# Patient Record
Sex: FEMALE | Race: WHITE | Employment: OTHER | URBAN - METROPOLITAN AREA
[De-identification: names, ages, dates, MRNs, and addresses within clinical notes are randomized per-mention and may not be internally consistent; named-entity substitution may affect disease eponyms.]

---

## 2019-08-01 ENCOUNTER — HOSPITAL ENCOUNTER (OUTPATIENT)
Dept: PREADMISSION TESTING | Age: 69
Discharge: HOME | End: 2019-08-05
Payer: MEDICARE

## 2019-08-01 VITALS
HEIGHT: 62 IN | SYSTOLIC BLOOD PRESSURE: 128 MMHG | OXYGEN SATURATION: 98 % | TEMPERATURE: 97.7 F | HEART RATE: 61 BPM | WEIGHT: 157 LBS | RESPIRATION RATE: 16 BRPM | BODY MASS INDEX: 28.89 KG/M2 | DIASTOLIC BLOOD PRESSURE: 63 MMHG

## 2019-08-01 DIAGNOSIS — I48.0 AF (PAROXYSMAL ATRIAL FIBRILLATION) (HCC): Chronic | ICD-10-CM

## 2019-08-01 DIAGNOSIS — K92.2 LOWER GI BLEED: Chronic | ICD-10-CM

## 2019-08-01 DIAGNOSIS — H93.13 TINNITUS OF BOTH EARS: ICD-10-CM

## 2019-08-01 DIAGNOSIS — Z87.891 FORMER SMOKER, STOPPED SMOKING IN DISTANT PAST: Chronic | ICD-10-CM

## 2019-08-01 PROBLEM — E78.5 HYPERLIPIDEMIA: Chronic | Status: ACTIVE | Noted: 2019-08-01

## 2019-08-01 PROBLEM — I10 HTN (HYPERTENSION): Chronic | Status: ACTIVE | Noted: 2019-08-01

## 2019-08-01 PROBLEM — H91.93 BILATERAL HEARING LOSS: Status: ACTIVE | Noted: 2019-08-01

## 2019-08-01 PROBLEM — H66.93 BILATERAL OTITIS MEDIA: Status: ACTIVE | Noted: 2019-08-01

## 2019-08-01 LAB
ANION GAP SERPL CALCULATED.3IONS-SCNC: 14 MEQ/L (ref 9–15)
BUN BLDV-MCNC: 8 MG/DL (ref 8–23)
CALCIUM SERPL-MCNC: 8.8 MG/DL (ref 8.5–9.9)
CHLORIDE BLD-SCNC: 105 MEQ/L (ref 95–107)
CO2: 24 MEQ/L (ref 20–31)
CREAT SERPL-MCNC: 0.56 MG/DL (ref 0.5–0.9)
GFR AFRICAN AMERICAN: >60
GFR NON-AFRICAN AMERICAN: >60
GLUCOSE BLD-MCNC: 87 MG/DL (ref 70–99)
HCT VFR BLD CALC: 34.3 % (ref 37–47)
HEMOGLOBIN: 11.2 G/DL (ref 12–16)
MCH RBC QN AUTO: 28.7 PG (ref 27–31.3)
MCHC RBC AUTO-ENTMCNC: 32.6 % (ref 33–37)
MCV RBC AUTO: 88.1 FL (ref 82–100)
PDW BLD-RTO: 20.9 % (ref 11.5–14.5)
PLATELET # BLD: 281 K/UL (ref 130–400)
POTASSIUM SERPL-SCNC: 4 MEQ/L (ref 3.4–4.9)
RBC # BLD: 3.9 M/UL (ref 4.2–5.4)
SODIUM BLD-SCNC: 143 MEQ/L (ref 135–144)
WBC # BLD: 7 K/UL (ref 4.8–10.8)

## 2019-08-01 PROCEDURE — 85027 COMPLETE CBC AUTOMATED: CPT

## 2019-08-01 PROCEDURE — 80048 BASIC METABOLIC PNL TOTAL CA: CPT

## 2019-08-01 RX ORDER — SOTALOL HYDROCHLORIDE 80 MG/1
120 TABLET ORAL 2 TIMES DAILY
COMMUNITY

## 2019-08-01 RX ORDER — LIDOCAINE HYDROCHLORIDE 10 MG/ML
1 INJECTION, SOLUTION EPIDURAL; INFILTRATION; INTRACAUDAL; PERINEURAL
Status: CANCELLED | OUTPATIENT
Start: 2019-08-08 | End: 2019-08-08

## 2019-08-01 RX ORDER — SODIUM CHLORIDE 0.9 % (FLUSH) 0.9 %
10 SYRINGE (ML) INJECTION EVERY 12 HOURS SCHEDULED
Status: CANCELLED | OUTPATIENT
Start: 2019-08-08

## 2019-08-01 RX ORDER — CHOLECALCIFEROL (VITAMIN D3) 1250 MCG
CAPSULE ORAL WEEKLY
COMMUNITY
End: 2021-12-09 | Stop reason: ALTCHOICE

## 2019-08-01 RX ORDER — SODIUM CHLORIDE 0.9 % (FLUSH) 0.9 %
10 SYRINGE (ML) INJECTION PRN
Status: CANCELLED | OUTPATIENT
Start: 2019-08-08

## 2019-08-01 RX ORDER — SODIUM CHLORIDE, SODIUM LACTATE, POTASSIUM CHLORIDE, CALCIUM CHLORIDE 600; 310; 30; 20 MG/100ML; MG/100ML; MG/100ML; MG/100ML
INJECTION, SOLUTION INTRAVENOUS CONTINUOUS
Status: CANCELLED | OUTPATIENT
Start: 2019-08-08

## 2019-08-01 RX ORDER — FLUTICASONE PROPIONATE 50 MCG
1 SPRAY, SUSPENSION (ML) NASAL PRN
COMMUNITY
End: 2019-08-01

## 2019-08-01 RX ORDER — ALBUTEROL SULFATE 2.5 MG/3ML
2.5 SOLUTION RESPIRATORY (INHALATION) EVERY 6 HOURS PRN
COMMUNITY

## 2019-08-01 ASSESSMENT — ENCOUNTER SYMPTOMS
SORE THROAT: 0
ABDOMINAL PAIN: 0
BACK PAIN: 0
WHEEZING: 0
COUGH: 0
SHORTNESS OF BREATH: 0
STRIDOR: 0
DIARRHEA: 0
TROUBLE SWALLOWING: 0
ALLERGIC/IMMUNOLOGIC NEGATIVE: 1
NAUSEA: 0
CONSTIPATION: 0
VOMITING: 0
EYES NEGATIVE: 1
CHEST TIGHTNESS: 0

## 2019-08-01 NOTE — H&P
Nurse Practitioner History and Physical      CHIEF COMPLAINT:  [place] both ears    HISTORY OF PRESENT ILLNESS:      The patient is a 68 y.o. female with significant past medical history of hearing loss & otitis media bilateral  who presents for bilateral myringotomy with ventilating tube insertion. Hx of bilateral ear aches since childhood & long standing [place]. Wears bilateral hearing aides but still [address] Scheduled for OR.     Past Medical History:        Diagnosis Date    Arthritis     CAD (coronary artery disease)     cardiac stents x 3    Cancer [facility]     right breast    COPD (chronic obstructive pulmonary disease) ([facility]     smoker since age 10    Diabetes mellitus ([facility]     hx > 30 yrs    GERD (gastroesophageal reflux disease)     History of blood transfusion 07/2019    blood transfusions x 3 due to lower GI bleed / [facility].    Hyperlipidemia     meds > 10 yrs    Hypertension     meds > 10 yrs    PVD (peripheral vascular disease) ([facility]     both legs     Past Surgical History:    Past Surgical History:   Procedure Laterality Date    BREAST SURGERY Right 1996    mastectomy due to malignancy / chemo to follow    CARDIAC CATHETERIZATION      COLONOSCOPY  08/25/2016    Geoffrey Reilly MD    CORONARY ANGIOPLASTY WITH STENT PLACEMENT      cardiac stents x 3    ENDOSCOPY, COLON, DIAGNOSTIC      EYE SURGERY      Phaco with IOL OS    HYSTERECTOMY  1968    TONSILLECTOMY      as child    TUNNELED VENOUS PORT PLACEMENT  2015    used for Iron infusions         Medications Prior to Admission:    Current Outpatient Medications   Medication Sig Dispense Refill    sotalol (BETAPACE) 80 MG tablet Take 120 mg by mouth 2 times daily      Mirabegron ER 50 MG TB24 Take by mouth daily      Cholecalciferol (VITAMIN D3) 29123 units CAPS Take by mouth once a week Indications: takes every Monday      insulin lispro protamine & lispro (HUMALOG MIX) (75-25) 100 UNIT per ML SUSP injection vial Inject into the skin 2 times Not on file   Relationships    Social connections:     Talks on phone: Not on file     Gets together: Not on file     Attends Jew service: Not on file     Active member of club or organization: Not on file     Attends meetings of clubs or organizations: Not on file     Relationship status: Not on file    Intimate partner violence:     Fear of current or ex partner: Not on file     Emotionally abused: Not on file     Physically abused: Not on file     Forced sexual activity: Not on file   Other Topics Concern    Not on file   Social History Narrative    Not on file       Family History:       Problem Relation Age of Onset    Breast Cancer Mother     Diabetes Mother     Stroke Mother     High Blood Pressure Father     Heart Disease Father     High Cholesterol Father     Diabetes Father     Stroke Father     Cancer Sister         oral cancer    Diabetes Brother     Kidney Disease Brother     Alcohol Abuse Brother         liver problems    Cancer Brother         liver & lung cancer    Other Son         PTSD / blood dyscrasia    Cancer Sister         brain & lung cancer    Other Son         MVA at age 29       Review of Systems   Constitutional: Negative. Negative for chills and fever. HENT: Positive for ear pain and hearing loss. Negative for sore throat and trouble swallowing. Full upper & lower dentures   Eyes: Negative. Negative for visual disturbance. Respiratory: Negative for cough, chest tightness, shortness of breath, wheezing and stridor. Cardiovascular: Negative for chest pain and palpitations. Gastrointestinal: Negative for abdominal pain, constipation, diarrhea, nausea and vomiting. Endocrine:        Dx diabetes   Genitourinary: Negative for dysuria and frequency. Musculoskeletal: Negative for back pain, myalgias and neck pain. Skin: Negative. Allergic/Immunologic: Negative. Neurological: Negative. Negative for seizures and headaches.

## 2019-08-01 NOTE — H&P (VIEW-ONLY)
Not on file   Relationships    Social connections:     Talks on phone: Not on file     Gets together: Not on file     Attends Yazidism service: Not on file     Active member of club or organization: Not on file     Attends meetings of clubs or organizations: Not on file     Relationship status: Not on file    Intimate partner violence:     Fear of current or ex partner: Not on file     Emotionally abused: Not on file     Physically abused: Not on file     Forced sexual activity: Not on file   Other Topics Concern    Not on file   Social History Narrative    Not on file       Family History:       Problem Relation Age of Onset    Breast Cancer Mother     Diabetes Mother     Stroke Mother     High Blood Pressure Father     Heart Disease Father     High Cholesterol Father     Diabetes Father     Stroke Father     Cancer Sister         oral cancer    Diabetes Brother     Kidney Disease Brother     Alcohol Abuse Brother         liver problems    Cancer Brother         liver & lung cancer    Other Son         PTSD / blood dyscrasia    Cancer Sister         brain & lung cancer    Other Son         MVA at age 29       Review of Systems   Constitutional: Negative. Negative for chills and fever. HENT: Positive for ear pain and hearing loss. Negative for sore throat and trouble swallowing. Full upper & lower dentures   Eyes: Negative. Negative for visual disturbance. Respiratory: Negative for cough, chest tightness, shortness of breath, wheezing and stridor. Cardiovascular: Negative for chest pain and palpitations. Gastrointestinal: Negative for abdominal pain, constipation, diarrhea, nausea and vomiting. Endocrine:        Dx diabetes   Genitourinary: Negative for dysuria and frequency. Musculoskeletal: Negative for back pain, myalgias and neck pain. Skin: Negative. Allergic/Immunologic: Negative. Neurological: Negative. Negative for seizures and headaches.

## 2019-08-08 ENCOUNTER — HOSPITAL ENCOUNTER (OUTPATIENT)
Age: 69
Setting detail: OUTPATIENT SURGERY
Discharge: HOME | End: 2019-08-08
Attending: OTOLARYNGOLOGY | Admitting: OTOLARYNGOLOGY
Payer: MEDICARE

## 2019-08-08 ENCOUNTER — ANESTHESIA EVENT (OUTPATIENT)
Dept: OPERATING ROOM | Age: 69
End: 2019-08-08
Payer: MEDICARE

## 2019-08-08 ENCOUNTER — ANESTHESIA (OUTPATIENT)
Dept: OPERATING ROOM | Age: 69
End: 2019-08-08
Payer: MEDICARE

## 2019-08-08 VITALS
BODY MASS INDEX: 28.89 KG/M2 | WEIGHT: 157 LBS | OXYGEN SATURATION: 94 % | HEART RATE: 54 BPM | RESPIRATION RATE: 20 BRPM | SYSTOLIC BLOOD PRESSURE: 140 MMHG | HEIGHT: 62 IN | TEMPERATURE: 97.7 F | DIASTOLIC BLOOD PRESSURE: 63 MMHG

## 2019-08-08 VITALS — SYSTOLIC BLOOD PRESSURE: 97 MMHG | OXYGEN SATURATION: 100 % | DIASTOLIC BLOOD PRESSURE: 50 MMHG

## 2019-08-08 LAB
GLUCOSE BLD-MCNC: 105 MG/DL (ref 60–115)
GLUCOSE BLD-MCNC: 107 MG/DL (ref 60–115)
PERFORMED ON: NORMAL
PERFORMED ON: NORMAL

## 2019-08-08 PROCEDURE — 3600000012 HC SURGERY LEVEL 2 ADDTL 15MIN: Performed by: OTOLARYNGOLOGY

## 2019-08-08 PROCEDURE — 2580000003 HC RX 258: Performed by: NURSE PRACTITIONER

## 2019-08-08 PROCEDURE — 3700000000 HC ANESTHESIA ATTENDED CARE: Performed by: OTOLARYNGOLOGY

## 2019-08-08 PROCEDURE — 7100000000 HC PACU RECOVERY - FIRST 15 MIN: Performed by: OTOLARYNGOLOGY

## 2019-08-08 PROCEDURE — 3600000002 HC SURGERY LEVEL 2 BASE: Performed by: OTOLARYNGOLOGY

## 2019-08-08 PROCEDURE — 2580000003 HC RX 258: Performed by: OTOLARYNGOLOGY

## 2019-08-08 PROCEDURE — 2709999900 HC NON-CHARGEABLE SUPPLY: Performed by: OTOLARYNGOLOGY

## 2019-08-08 PROCEDURE — 2580000003 HC RX 258: Performed by: ANESTHESIOLOGY

## 2019-08-08 PROCEDURE — 7100000010 HC PHASE II RECOVERY - FIRST 15 MIN: Performed by: OTOLARYNGOLOGY

## 2019-08-08 PROCEDURE — 6360000002 HC RX W HCPCS: Performed by: NURSE ANESTHETIST, CERTIFIED REGISTERED

## 2019-08-08 PROCEDURE — 2780000010 HC IMPLANT OTHER: Performed by: OTOLARYNGOLOGY

## 2019-08-08 PROCEDURE — 7100000001 HC PACU RECOVERY - ADDTL 15 MIN: Performed by: OTOLARYNGOLOGY

## 2019-08-08 PROCEDURE — 7100000011 HC PHASE II RECOVERY - ADDTL 15 MIN: Performed by: OTOLARYNGOLOGY

## 2019-08-08 PROCEDURE — 3700000001 HC ADD 15 MINUTES (ANESTHESIA): Performed by: OTOLARYNGOLOGY

## 2019-08-08 PROCEDURE — 6370000000 HC RX 637 (ALT 250 FOR IP): Performed by: OTOLARYNGOLOGY

## 2019-08-08 DEVICE — TUBE EAR VENTILATION 1.14X7.5MM: Type: IMPLANTABLE DEVICE | Site: EAR | Status: FUNCTIONAL

## 2019-08-08 RX ORDER — DEXAMETHASONE SODIUM PHOSPHATE 10 MG/ML
INJECTION INTRAMUSCULAR; INTRAVENOUS PRN
Status: DISCONTINUED | OUTPATIENT
Start: 2019-08-08 | End: 2019-08-08 | Stop reason: SDUPTHER

## 2019-08-08 RX ORDER — MAGNESIUM HYDROXIDE 1200 MG/15ML
LIQUID ORAL CONTINUOUS PRN
Status: COMPLETED | OUTPATIENT
Start: 2019-08-08 | End: 2019-08-08

## 2019-08-08 RX ORDER — LIDOCAINE HYDROCHLORIDE 10 MG/ML
1 INJECTION, SOLUTION EPIDURAL; INFILTRATION; INTRACAUDAL; PERINEURAL
Status: DISCONTINUED | OUTPATIENT
Start: 2019-08-08 | End: 2019-08-08 | Stop reason: HOSPADM

## 2019-08-08 RX ORDER — METOCLOPRAMIDE HYDROCHLORIDE 5 MG/ML
10 INJECTION INTRAMUSCULAR; INTRAVENOUS
Status: DISCONTINUED | OUTPATIENT
Start: 2019-08-08 | End: 2019-08-08 | Stop reason: HOSPADM

## 2019-08-08 RX ORDER — SODIUM CHLORIDE 0.9 % (FLUSH) 0.9 %
10 SYRINGE (ML) INJECTION EVERY 12 HOURS SCHEDULED
Status: DISCONTINUED | OUTPATIENT
Start: 2019-08-08 | End: 2019-08-08 | Stop reason: HOSPADM

## 2019-08-08 RX ORDER — DIPHENHYDRAMINE HYDROCHLORIDE 50 MG/ML
12.5 INJECTION INTRAMUSCULAR; INTRAVENOUS
Status: DISCONTINUED | OUTPATIENT
Start: 2019-08-08 | End: 2019-08-08 | Stop reason: HOSPADM

## 2019-08-08 RX ORDER — SODIUM CHLORIDE 0.9 % (FLUSH) 0.9 %
10 SYRINGE (ML) INJECTION PRN
Status: DISCONTINUED | OUTPATIENT
Start: 2019-08-08 | End: 2019-08-08 | Stop reason: HOSPADM

## 2019-08-08 RX ORDER — HYDROCODONE BITARTRATE AND ACETAMINOPHEN 5; 325 MG/1; MG/1
1 TABLET ORAL PRN
Status: DISCONTINUED | OUTPATIENT
Start: 2019-08-08 | End: 2019-08-08 | Stop reason: HOSPADM

## 2019-08-08 RX ORDER — MEPERIDINE HYDROCHLORIDE 25 MG/ML
12.5 INJECTION INTRAMUSCULAR; INTRAVENOUS; SUBCUTANEOUS EVERY 5 MIN PRN
Status: DISCONTINUED | OUTPATIENT
Start: 2019-08-08 | End: 2019-08-08 | Stop reason: HOSPADM

## 2019-08-08 RX ORDER — LIDOCAINE HYDROCHLORIDE 20 MG/ML
INJECTION, SOLUTION INTRAVENOUS PRN
Status: DISCONTINUED | OUTPATIENT
Start: 2019-08-08 | End: 2019-08-08 | Stop reason: SDUPTHER

## 2019-08-08 RX ORDER — ONDANSETRON 2 MG/ML
INJECTION INTRAMUSCULAR; INTRAVENOUS PRN
Status: DISCONTINUED | OUTPATIENT
Start: 2019-08-08 | End: 2019-08-08 | Stop reason: SDUPTHER

## 2019-08-08 RX ORDER — ONDANSETRON 2 MG/ML
4 INJECTION INTRAMUSCULAR; INTRAVENOUS
Status: DISCONTINUED | OUTPATIENT
Start: 2019-08-08 | End: 2019-08-08 | Stop reason: HOSPADM

## 2019-08-08 RX ORDER — SODIUM CHLORIDE, SODIUM LACTATE, POTASSIUM CHLORIDE, CALCIUM CHLORIDE 600; 310; 30; 20 MG/100ML; MG/100ML; MG/100ML; MG/100ML
INJECTION, SOLUTION INTRAVENOUS CONTINUOUS
Status: DISCONTINUED | OUTPATIENT
Start: 2019-08-08 | End: 2019-08-08 | Stop reason: HOSPADM

## 2019-08-08 RX ORDER — HYDROCODONE BITARTRATE AND ACETAMINOPHEN 5; 325 MG/1; MG/1
2 TABLET ORAL PRN
Status: DISCONTINUED | OUTPATIENT
Start: 2019-08-08 | End: 2019-08-08 | Stop reason: HOSPADM

## 2019-08-08 RX ORDER — PROPOFOL 10 MG/ML
INJECTION, EMULSION INTRAVENOUS PRN
Status: DISCONTINUED | OUTPATIENT
Start: 2019-08-08 | End: 2019-08-08 | Stop reason: SDUPTHER

## 2019-08-08 RX ORDER — FENTANYL CITRATE 50 UG/ML
50 INJECTION, SOLUTION INTRAMUSCULAR; INTRAVENOUS EVERY 10 MIN PRN
Status: DISCONTINUED | OUTPATIENT
Start: 2019-08-08 | End: 2019-08-08 | Stop reason: HOSPADM

## 2019-08-08 RX ADMIN — ONDANSETRON 4 MG: 2 INJECTION INTRAMUSCULAR; INTRAVENOUS at 10:04

## 2019-08-08 RX ADMIN — PROPOFOL 30 MG: 10 INJECTION, EMULSION INTRAVENOUS at 10:16

## 2019-08-08 RX ADMIN — PROPOFOL 120 MG: 10 INJECTION, EMULSION INTRAVENOUS at 10:00

## 2019-08-08 RX ADMIN — DEXAMETHASONE SODIUM PHOSPHATE 10 MG: 10 INJECTION INTRAMUSCULAR; INTRAVENOUS at 10:04

## 2019-08-08 RX ADMIN — SODIUM CHLORIDE, POTASSIUM CHLORIDE, SODIUM LACTATE AND CALCIUM CHLORIDE: 600; 310; 30; 20 INJECTION, SOLUTION INTRAVENOUS at 09:56

## 2019-08-08 RX ADMIN — SODIUM CHLORIDE, POTASSIUM CHLORIDE, SODIUM LACTATE AND CALCIUM CHLORIDE: 600; 310; 30; 20 INJECTION, SOLUTION INTRAVENOUS at 08:49

## 2019-08-08 RX ADMIN — LIDOCAINE HYDROCHLORIDE 80 MG: 20 INJECTION, SOLUTION INTRAVENOUS at 10:00

## 2019-08-08 ASSESSMENT — PULMONARY FUNCTION TESTS
PIF_VALUE: 21
PIF_VALUE: 18
PIF_VALUE: 3
PIF_VALUE: 12
PIF_VALUE: 18
PIF_VALUE: 2
PIF_VALUE: 15
PIF_VALUE: 1
PIF_VALUE: 15
PIF_VALUE: 1
PIF_VALUE: 18
PIF_VALUE: 18
PIF_VALUE: 20
PIF_VALUE: 3
PIF_VALUE: 18
PIF_VALUE: 9
PIF_VALUE: 5
PIF_VALUE: 0
PIF_VALUE: 1
PIF_VALUE: 18
PIF_VALUE: 15
PIF_VALUE: 15
PIF_VALUE: 18
PIF_VALUE: 3
PIF_VALUE: 12
PIF_VALUE: 15
PIF_VALUE: 18
PIF_VALUE: 6
PIF_VALUE: 18
PIF_VALUE: 1
PIF_VALUE: 1
PIF_VALUE: 2

## 2019-08-08 ASSESSMENT — COPD QUESTIONNAIRES: CAT_SEVERITY: NO INTERVAL CHANGE

## 2019-08-08 ASSESSMENT — PAIN - FUNCTIONAL ASSESSMENT: PAIN_FUNCTIONAL_ASSESSMENT: 0-10

## 2019-08-08 NOTE — ANESTHESIA PRE PROCEDURE
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    Chronic GI bleeding K92.2    COPD (chronic obstructive pulmonary disease) ([facility]) J44.9    Coronary atherosclerosis I25.10    Iron (Fe) deficiency anemia D50.9    NTEMI (non-ST elevation myocardial infarction) I21.4    PVD (peripheral vascular disease) ([facility]) I73.9    Senile nuclear sclerosis H25.10    Former smoker, stopped smoking in distant past Z87.891    Lower GI bleed K92.2    HTN (hypertension) I10    Hyperlipidemia E78.5    AF (paroxysmal atrial fibrillation) ([facility]) I48.0       Past Medical History:        Diagnosis Date    Arthritis     CAD (coronary artery disease)     cardiac stents x 3    Cancer ([facility]     right breast    COPD (chronic obstructive pulmonary disease) ([facility])     smoker since age 10    Diabetes mellitus ([facility])     hx > 30 yrs    GERD (gastroesophageal reflux disease)     History of blood transfusion 07/2019    blood transfusions x 3 due to [facility].    Hyperlipidemia     meds > 10 yrs    Hypertension     meds > 10 yrs    PVD (peripheral vascular disease) ([facility]     both legs       Past Surgical History:        Procedure Laterality Date    BREAST SURGERY Right 1996    mastectomy due to malignancy / chemo to follow    CARDIAC CATHETERIZATION      COLONOSCOPY  08/25/2016    Carri Harry MD    CORONARY ANGIOPLASTY WITH STENT PLACEMENT      cardiac stents x 3    ENDOSCOPY, COLON, DIAGNOSTIC      EYE SURGERY      Phaco with IOL OS    HYSTERECTOMY  1968    TONSILLECTOMY      as child   Rasheeda Treadwell Airway: Mallampati: II  TM distance: >3 FB   Neck ROM: full  Mouth opening: > = 3 FB Dental: normal exam         Pulmonary:normal exam    (+) COPD: no interval change,                             Cardiovascular:    (+) hypertension: no interval change, past MI: no interval change, CAD: no interval change,       ECG reviewed               Beta Blocker:  Dose within 24 Hrs         Neuro/Psych:   Negative Neuro/Psych ROS              GI/Hepatic/Renal:   (+) GERD:,           Endo/Other:    (+) Diabetes, . Pt had PAT visit. Abdominal:           Vascular: negative vascular ROS. Anesthesia Plan      general     ASA 3       Induction: intravenous. MIPS: Prophylactic antiemetics administered. Anesthetic plan and risks discussed with patient. Plan discussed with CRNA.     Attending anesthesiologist reviewed and agrees with Pre Eval content              Abbe Hernandez MD   8/8/2019

## 2019-08-08 NOTE — OP NOTE
[address]                      [address], [address] [facility]                                OPERATIVE REPORT    PATIENT NAME: Dayanara Skelton                       :        1950  MED REC NO:   [id number]                            ROOM:  ACCOUNT NO:   [de-identified]                           ADMIT DATE: 2019  PROVIDER:     Mary Ann Argueta MD    DATE OF PROCEDURE:  2019    PREOPERATIVE DIAGNOSIS:  Bilateral conductive hearing loss secondary to  ear effusion and monomeric membranes. POSTOPERATIVE DIAGNOSIS  Bilateral conductive hearing loss secondary to  ear effusion and monomeric membranes. OPERATION PERFORMED:  Bilateral myringotomy and ventilating tube  Insertion. EBL: none    SURGEON:  Mary Ann Argueta MD    SCRUB NURSE:  Devi Alberts. ANESTHESIA:  General.    ANESTHESIOLOGIST:  Ward Wade MD    CLINICAL INDICATIONS:  This is a 78-year-old white female who was  initially seen in the office due to a longstanding history of recurrent  middle ear infection characterized by hearing loss, otalgia with  dysequilibrium. She had been diagnosed to have recurrent otitis media  before and underwent bilateral myringotomy and ventilating tube  insertion in [place]. She came back care with the same issue, saw her  family doctor and subsequently referred to me for evaluation. Initial  exam in the office showed that the right tympanic membrane was retracted  with the monomeric membrane with air fluid level and the left side was  very thick and also presence of air fluid level under the drum. It was  decided that this patient undergo the aforementioned surgical  intervention. OPERATIVE PROCEDURE:  The patient was placed in supine position and  general anesthesia utilizing an LMA was satisfactorily inducted. The  right ear was inspected under the microscope with magnification of 250X  lens.   The previously noted findings were seen with retraction

## 2019-10-07 ENCOUNTER — APPOINTMENT (OUTPATIENT)
Dept: GENERAL RADIOLOGY | Age: 69
End: 2019-10-07
Payer: MEDICARE

## 2019-10-07 ENCOUNTER — HOSPITAL ENCOUNTER (EMERGENCY)
Age: 69
Discharge: HOME | End: 2019-10-07
Payer: MEDICARE

## 2019-10-07 ENCOUNTER — APPOINTMENT (OUTPATIENT)
Dept: CT IMAGING | Age: 69
End: 2019-10-07
Payer: MEDICARE

## 2019-10-07 VITALS
TEMPERATURE: 98 F | HEIGHT: 62 IN | OXYGEN SATURATION: 96 % | SYSTOLIC BLOOD PRESSURE: 150 MMHG | RESPIRATION RATE: 16 BRPM | WEIGHT: 149 LBS | HEART RATE: 56 BPM | DIASTOLIC BLOOD PRESSURE: 46 MMHG | BODY MASS INDEX: 27.42 KG/M2

## 2019-10-07 DIAGNOSIS — S20.212A CHEST WALL CONTUSION, LEFT, INITIAL ENCOUNTER: ICD-10-CM

## 2019-10-07 DIAGNOSIS — S51.019A SKIN TEAR OF ELBOW WITHOUT COMPLICATION, INITIAL ENCOUNTER: ICD-10-CM

## 2019-10-07 DIAGNOSIS — S09.90XA CLOSED HEAD INJURY, INITIAL ENCOUNTER: Primary | ICD-10-CM

## 2019-10-07 LAB
ALBUMIN SERPL-MCNC: 3.6 G/DL (ref 3.5–4.6)
ALP BLD-CCNC: 65 U/L (ref 40–130)
ALT SERPL-CCNC: 6 U/L (ref 0–33)
ANION GAP SERPL CALCULATED.3IONS-SCNC: 15 MEQ/L (ref 9–15)
APTT: 28.5 SEC (ref 24.4–36.8)
AST SERPL-CCNC: 13 U/L (ref 0–35)
BASOPHILS ABSOLUTE: 0 K/UL (ref 0–0.2)
BASOPHILS RELATIVE PERCENT: 0.3 %
BILIRUB SERPL-MCNC: 0.5 MG/DL (ref 0.2–0.7)
BUN BLDV-MCNC: 9 MG/DL (ref 8–23)
CALCIUM SERPL-MCNC: 8.7 MG/DL (ref 8.5–9.9)
CHLORIDE BLD-SCNC: 102 MEQ/L (ref 95–107)
CO2: 24 MEQ/L (ref 20–31)
CREAT SERPL-MCNC: 0.51 MG/DL (ref 0.5–0.9)
EOSINOPHILS ABSOLUTE: 0.2 K/UL (ref 0–0.7)
EOSINOPHILS RELATIVE PERCENT: 2 %
GFR AFRICAN AMERICAN: >60
GFR NON-AFRICAN AMERICAN: >60
GLOBULIN: 2.6 G/DL (ref 2.3–3.5)
GLUCOSE BLD-MCNC: 109 MG/DL (ref 70–99)
HCT VFR BLD CALC: 38.3 % (ref 37–47)
HEMOGLOBIN: 12.5 G/DL (ref 12–16)
INR BLD: 1
LYMPHOCYTES ABSOLUTE: 1.6 K/UL (ref 1–4.8)
LYMPHOCYTES RELATIVE PERCENT: 17.7 %
MCH RBC QN AUTO: 27.3 PG (ref 27–31.3)
MCHC RBC AUTO-ENTMCNC: 32.5 % (ref 33–37)
MCV RBC AUTO: 83.9 FL (ref 82–100)
MONOCYTES ABSOLUTE: 0.5 K/UL (ref 0.2–0.8)
MONOCYTES RELATIVE PERCENT: 5.1 %
NEUTROPHILS ABSOLUTE: 6.8 K/UL (ref 1.4–6.5)
NEUTROPHILS RELATIVE PERCENT: 74.9 %
PDW BLD-RTO: 17.8 % (ref 11.5–14.5)
PLATELET # BLD: 181 K/UL (ref 130–400)
POTASSIUM SERPL-SCNC: 4.3 MEQ/L (ref 3.4–4.9)
PROTHROMBIN TIME: 13.2 SEC (ref 12.3–14.9)
RBC # BLD: 4.56 M/UL (ref 4.2–5.4)
SODIUM BLD-SCNC: 141 MEQ/L (ref 135–144)
TOTAL PROTEIN: 6.2 G/DL (ref 6.3–8)
WBC # BLD: 9.1 K/UL (ref 4.8–10.8)

## 2019-10-07 PROCEDURE — 85730 THROMBOPLASTIN TIME PARTIAL: CPT

## 2019-10-07 PROCEDURE — 6370000000 HC RX 637 (ALT 250 FOR IP): Performed by: PHYSICIAN ASSISTANT

## 2019-10-07 PROCEDURE — 72050 X-RAY EXAM NECK SPINE 4/5VWS: CPT

## 2019-10-07 PROCEDURE — 73030 X-RAY EXAM OF SHOULDER: CPT

## 2019-10-07 PROCEDURE — 71101 X-RAY EXAM UNILAT RIBS/CHEST: CPT

## 2019-10-07 PROCEDURE — 73070 X-RAY EXAM OF ELBOW: CPT

## 2019-10-07 PROCEDURE — 85610 PROTHROMBIN TIME: CPT

## 2019-10-07 PROCEDURE — 36415 COLL VENOUS BLD VENIPUNCTURE: CPT

## 2019-10-07 PROCEDURE — 99284 EMERGENCY DEPT VISIT MOD MDM: CPT

## 2019-10-07 PROCEDURE — 70450 CT HEAD/BRAIN W/O DYE: CPT

## 2019-10-07 PROCEDURE — 85025 COMPLETE CBC W/AUTO DIFF WBC: CPT

## 2019-10-07 PROCEDURE — 80053 COMPREHEN METABOLIC PANEL: CPT

## 2019-10-07 RX ORDER — SULFAMETHOXAZOLE AND TRIMETHOPRIM 800; 160 MG/1; MG/1
1 TABLET ORAL ONCE
Status: COMPLETED | OUTPATIENT
Start: 2019-10-07 | End: 2019-10-07

## 2019-10-07 RX ORDER — HYDROCODONE BITARTRATE AND ACETAMINOPHEN 5; 325 MG/1; MG/1
1 TABLET ORAL EVERY 6 HOURS PRN
Qty: 8 TABLET | Refills: 0 | Status: SHIPPED | OUTPATIENT
Start: 2019-10-07 | End: 2019-10-09

## 2019-10-07 RX ORDER — SULFAMETHOXAZOLE AND TRIMETHOPRIM 800; 160 MG/1; MG/1
1 TABLET ORAL 2 TIMES DAILY
Qty: 14 TABLET | Refills: 0 | Status: SHIPPED | OUTPATIENT
Start: 2019-10-07 | End: 2019-10-14

## 2019-10-07 RX ORDER — BACITRACIN, NEOMYCIN, POLYMYXIN B 400; 3.5; 5 [USP'U]/G; MG/G; [USP'U]/G
OINTMENT TOPICAL ONCE
Status: COMPLETED | OUTPATIENT
Start: 2019-10-07 | End: 2019-10-07

## 2019-10-07 RX ADMIN — SULFAMETHOXAZOLE AND TRIMETHOPRIM 1 TABLET: 800; 160 TABLET ORAL at 14:16

## 2019-10-07 RX ADMIN — BACITRACIN ZINC, NEOMYCIN, POLYMYXIN B: 400; 3.5; 5 OINTMENT TOPICAL at 13:16

## 2019-10-07 ASSESSMENT — ENCOUNTER SYMPTOMS
CONSTIPATION: 0
EYE DISCHARGE: 0
ABDOMINAL DISTENTION: 0
COLOR CHANGE: 0
ABDOMINAL PAIN: 0
SORE THROAT: 0
RHINORRHEA: 0
SHORTNESS OF BREATH: 0

## 2019-10-07 ASSESSMENT — PAIN DESCRIPTION - PAIN TYPE: TYPE: ACUTE PAIN

## 2019-10-07 ASSESSMENT — PAIN DESCRIPTION - LOCATION: LOCATION: ARM

## 2019-10-07 ASSESSMENT — PAIN SCALES - GENERAL: PAINLEVEL_OUTOF10: 7

## 2019-10-12 ENCOUNTER — HOSPITAL ENCOUNTER (EMERGENCY)
Age: 69
Discharge: HOME | End: 2019-10-13
Payer: MEDICARE

## 2019-10-12 ENCOUNTER — APPOINTMENT (OUTPATIENT)
Dept: CT IMAGING | Age: 69
End: 2019-10-12
Payer: MEDICARE

## 2019-10-12 DIAGNOSIS — S22.32XA CLOSED FRACTURE OF ONE RIB OF LEFT SIDE, INITIAL ENCOUNTER: Primary | ICD-10-CM

## 2019-10-12 DIAGNOSIS — J44.1 COPD EXACERBATION (HCC): ICD-10-CM

## 2019-10-12 DIAGNOSIS — J40 BRONCHITIS: ICD-10-CM

## 2019-10-12 LAB
ALBUMIN SERPL-MCNC: 4.2 G/DL (ref 3.5–4.6)
ALP BLD-CCNC: 66 U/L (ref 40–130)
ALT SERPL-CCNC: 8 U/L (ref 0–33)
ANION GAP SERPL CALCULATED.3IONS-SCNC: 13 MEQ/L (ref 9–15)
AST SERPL-CCNC: 14 U/L (ref 0–35)
BASOPHILS ABSOLUTE: 0.1 K/UL (ref 0–0.2)
BASOPHILS RELATIVE PERCENT: 1.1 %
BILIRUB SERPL-MCNC: 0.3 MG/DL (ref 0.2–0.7)
BUN BLDV-MCNC: 19 MG/DL (ref 8–23)
CALCIUM SERPL-MCNC: 9.7 MG/DL (ref 8.5–9.9)
CHLORIDE BLD-SCNC: 101 MEQ/L (ref 95–107)
CO2: 25 MEQ/L (ref 20–31)
CREAT SERPL-MCNC: 0.94 MG/DL (ref 0.5–0.9)
EKG ATRIAL RATE: 53 BPM
EKG P AXIS: 50 DEGREES
EKG P-R INTERVAL: 134 MS
EKG Q-T INTERVAL: 514 MS
EKG QRS DURATION: 82 MS
EKG QTC CALCULATION (BAZETT): 482 MS
EKG R AXIS: 44 DEGREES
EKG T AXIS: 78 DEGREES
EKG VENTRICULAR RATE: 53 BPM
EOSINOPHILS ABSOLUTE: 0.3 K/UL (ref 0–0.7)
EOSINOPHILS RELATIVE PERCENT: 3.3 %
GFR AFRICAN AMERICAN: >60
GFR NON-AFRICAN AMERICAN: 58.6
GLOBULIN: 2.6 G/DL (ref 2.3–3.5)
GLUCOSE BLD-MCNC: 102 MG/DL (ref 70–99)
HCT VFR BLD CALC: 39.6 % (ref 37–47)
HEMOGLOBIN: 12.8 G/DL (ref 12–16)
LYMPHOCYTES ABSOLUTE: 1.8 K/UL (ref 1–4.8)
LYMPHOCYTES RELATIVE PERCENT: 22.5 %
MCH RBC QN AUTO: 27 PG (ref 27–31.3)
MCHC RBC AUTO-ENTMCNC: 32.3 % (ref 33–37)
MCV RBC AUTO: 83.5 FL (ref 82–100)
MONOCYTES ABSOLUTE: 0.6 K/UL (ref 0.2–0.8)
MONOCYTES RELATIVE PERCENT: 7 %
NEUTROPHILS ABSOLUTE: 5.4 K/UL (ref 1.4–6.5)
NEUTROPHILS RELATIVE PERCENT: 66.1 %
PDW BLD-RTO: 18.3 % (ref 11.5–14.5)
PLATELET # BLD: 189 K/UL (ref 130–400)
POC CREATININE WHOLE BLOOD: 1
POTASSIUM SERPL-SCNC: 5 MEQ/L (ref 3.4–4.9)
RBC # BLD: 4.74 M/UL (ref 4.2–5.4)
SODIUM BLD-SCNC: 139 MEQ/L (ref 135–144)
TOTAL PROTEIN: 6.8 G/DL (ref 6.3–8)
TROPONIN: <0.01 NG/ML (ref 0–0.01)
WBC # BLD: 8.1 K/UL (ref 4.8–10.8)

## 2019-10-12 PROCEDURE — 2580000003 HC RX 258: Performed by: PERSONAL EMERGENCY RESPONSE ATTENDANT

## 2019-10-12 PROCEDURE — 6370000000 HC RX 637 (ALT 250 FOR IP): Performed by: PERSONAL EMERGENCY RESPONSE ATTENDANT

## 2019-10-12 PROCEDURE — 70450 CT HEAD/BRAIN W/O DYE: CPT

## 2019-10-12 PROCEDURE — 85025 COMPLETE CBC W/AUTO DIFF WBC: CPT

## 2019-10-12 PROCEDURE — 99285 EMERGENCY DEPT VISIT HI MDM: CPT

## 2019-10-12 PROCEDURE — 6360000002 HC RX W HCPCS: Performed by: PERSONAL EMERGENCY RESPONSE ATTENDANT

## 2019-10-12 PROCEDURE — 71275 CT ANGIOGRAPHY CHEST: CPT

## 2019-10-12 PROCEDURE — 93005 ELECTROCARDIOGRAM TRACING: CPT | Performed by: PERSONAL EMERGENCY RESPONSE ATTENDANT

## 2019-10-12 PROCEDURE — 94640 AIRWAY INHALATION TREATMENT: CPT

## 2019-10-12 PROCEDURE — 96375 TX/PRO/DX INJ NEW DRUG ADDON: CPT

## 2019-10-12 PROCEDURE — 36415 COLL VENOUS BLD VENIPUNCTURE: CPT

## 2019-10-12 PROCEDURE — 6360000004 HC RX CONTRAST MEDICATION: Performed by: PERSONAL EMERGENCY RESPONSE ATTENDANT

## 2019-10-12 PROCEDURE — 80053 COMPREHEN METABOLIC PANEL: CPT

## 2019-10-12 PROCEDURE — 96374 THER/PROPH/DIAG INJ IV PUSH: CPT

## 2019-10-12 PROCEDURE — 84484 ASSAY OF TROPONIN QUANT: CPT

## 2019-10-12 RX ORDER — IPRATROPIUM BROMIDE AND ALBUTEROL SULFATE 2.5; .5 MG/3ML; MG/3ML
1 SOLUTION RESPIRATORY (INHALATION) CONTINUOUS PRN
Status: DISCONTINUED | OUTPATIENT
Start: 2019-10-12 | End: 2019-10-13 | Stop reason: HOSPADM

## 2019-10-12 RX ORDER — METHYLPREDNISOLONE SODIUM SUCCINATE 125 MG/2ML
125 INJECTION, POWDER, LYOPHILIZED, FOR SOLUTION INTRAMUSCULAR; INTRAVENOUS ONCE
Status: COMPLETED | OUTPATIENT
Start: 2019-10-12 | End: 2019-10-12

## 2019-10-12 RX ORDER — FENTANYL CITRATE 50 UG/ML
50 INJECTION, SOLUTION INTRAMUSCULAR; INTRAVENOUS ONCE
Status: COMPLETED | OUTPATIENT
Start: 2019-10-12 | End: 2019-10-12

## 2019-10-12 RX ORDER — 0.9 % SODIUM CHLORIDE 0.9 %
1000 INTRAVENOUS SOLUTION INTRAVENOUS ONCE
Status: COMPLETED | OUTPATIENT
Start: 2019-10-12 | End: 2019-10-13

## 2019-10-12 RX ORDER — ONDANSETRON 2 MG/ML
4 INJECTION INTRAMUSCULAR; INTRAVENOUS ONCE
Status: COMPLETED | OUTPATIENT
Start: 2019-10-12 | End: 2019-10-12

## 2019-10-12 RX ADMIN — FENTANYL CITRATE 50 MCG: 50 INJECTION, SOLUTION INTRAMUSCULAR; INTRAVENOUS at 22:46

## 2019-10-12 RX ADMIN — METHYLPREDNISOLONE SODIUM SUCCINATE 125 MG: 125 INJECTION, POWDER, FOR SOLUTION INTRAMUSCULAR; INTRAVENOUS at 22:46

## 2019-10-12 RX ADMIN — IPRATROPIUM BROMIDE AND ALBUTEROL SULFATE 1 AMPULE: .5; 3 SOLUTION RESPIRATORY (INHALATION) at 23:01

## 2019-10-12 RX ADMIN — SODIUM CHLORIDE 1000 ML: 9 INJECTION, SOLUTION INTRAVENOUS at 23:59

## 2019-10-12 RX ADMIN — ONDANSETRON 4 MG: 2 INJECTION INTRAMUSCULAR; INTRAVENOUS at 22:45

## 2019-10-12 RX ADMIN — IOPAMIDOL 100 ML: 612 INJECTION, SOLUTION INTRAVENOUS at 23:54

## 2019-10-12 RX ADMIN — IPRATROPIUM BROMIDE AND ALBUTEROL SULFATE 1 AMPULE: .5; 3 SOLUTION RESPIRATORY (INHALATION) at 23:06

## 2019-10-12 RX ADMIN — IPRATROPIUM BROMIDE AND ALBUTEROL SULFATE 1 AMPULE: .5; 3 SOLUTION RESPIRATORY (INHALATION) at 22:54

## 2019-10-12 ASSESSMENT — PAIN DESCRIPTION - PAIN TYPE
TYPE: ACUTE PAIN
TYPE: ACUTE PAIN

## 2019-10-12 ASSESSMENT — ENCOUNTER SYMPTOMS
COUGH: 1
VOMITING: 1
SORE THROAT: 0
RHINORRHEA: 0
SHORTNESS OF BREATH: 1
COLOR CHANGE: 0
NAUSEA: 1
DIARRHEA: 0
ABDOMINAL PAIN: 0
BLOOD IN STOOL: 0

## 2019-10-12 ASSESSMENT — PAIN DESCRIPTION - ORIENTATION
ORIENTATION: LEFT
ORIENTATION: LEFT

## 2019-10-12 ASSESSMENT — PAIN DESCRIPTION - LOCATION
LOCATION: RIB CAGE
LOCATION: RIB CAGE

## 2019-10-12 ASSESSMENT — PAIN DESCRIPTION - FREQUENCY: FREQUENCY: CONTINUOUS

## 2019-10-12 ASSESSMENT — PAIN SCALES - GENERAL
PAINLEVEL_OUTOF10: 10

## 2019-10-13 VITALS
BODY MASS INDEX: 27.42 KG/M2 | RESPIRATION RATE: 15 BRPM | OXYGEN SATURATION: 95 % | WEIGHT: 149 LBS | HEIGHT: 62 IN | DIASTOLIC BLOOD PRESSURE: 97 MMHG | TEMPERATURE: 97.5 F | HEART RATE: 55 BPM | SYSTOLIC BLOOD PRESSURE: 115 MMHG

## 2019-10-13 PROCEDURE — 6360000002 HC RX W HCPCS: Performed by: PERSONAL EMERGENCY RESPONSE ATTENDANT

## 2019-10-13 PROCEDURE — 96376 TX/PRO/DX INJ SAME DRUG ADON: CPT

## 2019-10-13 PROCEDURE — 94150 VITAL CAPACITY TEST: CPT

## 2019-10-13 RX ORDER — OXYCODONE HYDROCHLORIDE AND ACETAMINOPHEN 5; 325 MG/1; MG/1
1-2 TABLET ORAL EVERY 6 HOURS PRN
Qty: 10 TABLET | Refills: 0 | Status: SHIPPED | OUTPATIENT
Start: 2019-10-13 | End: 2019-10-16

## 2019-10-13 RX ORDER — PREDNISONE 20 MG/1
40 TABLET ORAL DAILY
Qty: 6 TABLET | Refills: 0 | Status: SHIPPED | OUTPATIENT
Start: 2019-10-13 | End: 2019-10-16

## 2019-10-13 RX ORDER — DOXYCYCLINE HYCLATE 100 MG/1
100 CAPSULE ORAL 2 TIMES DAILY
Qty: 20 CAPSULE | Refills: 0 | Status: SHIPPED | OUTPATIENT
Start: 2019-10-13 | End: 2019-10-23

## 2019-10-13 RX ORDER — FENTANYL CITRATE 50 UG/ML
25 INJECTION, SOLUTION INTRAMUSCULAR; INTRAVENOUS ONCE
Status: COMPLETED | OUTPATIENT
Start: 2019-10-13 | End: 2019-10-13

## 2019-10-13 RX ADMIN — FENTANYL CITRATE 25 MCG: 50 INJECTION, SOLUTION INTRAMUSCULAR; INTRAVENOUS at 00:57

## 2019-10-13 ASSESSMENT — PAIN SCALES - GENERAL
PAINLEVEL_OUTOF10: 4
PAINLEVEL_OUTOF10: 4
PAINLEVEL_OUTOF10: 6

## 2019-10-13 ASSESSMENT — PAIN DESCRIPTION - PAIN TYPE
TYPE: ACUTE PAIN
TYPE: ACUTE PAIN

## 2019-10-13 ASSESSMENT — PAIN DESCRIPTION - LOCATION: LOCATION: RIB CAGE

## 2019-10-14 LAB
GFR AFRICAN AMERICAN: >60
GFR NON-AFRICAN AMERICAN: 55
PERFORMED ON: ABNORMAL
POC CREATININE: 1 MG/DL (ref 0.6–1.2)
POC SAMPLE TYPE: ABNORMAL

## 2019-10-14 PROCEDURE — 93010 ELECTROCARDIOGRAM REPORT: CPT | Performed by: INTERNAL MEDICINE

## 2021-07-13 LAB
LEFT VENTRICULAR EJECTION FRACTION MODE: NORMAL
LV EF: 60 %

## 2021-12-09 ENCOUNTER — OFFICE VISIT (OUTPATIENT)
Dept: FAMILY MEDICINE CLINIC | Age: 71
End: 2021-12-09
Payer: MEDICARE

## 2021-12-09 VITALS
BODY MASS INDEX: 26.17 KG/M2 | WEIGHT: 142.2 LBS | TEMPERATURE: 97.5 F | OXYGEN SATURATION: 96 % | HEART RATE: 59 BPM | DIASTOLIC BLOOD PRESSURE: 60 MMHG | SYSTOLIC BLOOD PRESSURE: 118 MMHG | HEIGHT: 62 IN

## 2021-12-09 DIAGNOSIS — E11.9 TYPE 2 DIABETES MELLITUS WITHOUT COMPLICATION, WITHOUT LONG-TERM CURRENT USE OF INSULIN (HCC): Primary | ICD-10-CM

## 2021-12-09 DIAGNOSIS — I73.9 PERIPHERAL VASCULAR DISEASE (HCC): ICD-10-CM

## 2021-12-09 DIAGNOSIS — J43.9 PULMONARY EMPHYSEMA, UNSPECIFIED EMPHYSEMA TYPE (HCC): ICD-10-CM

## 2021-12-09 DIAGNOSIS — I48.11 LONGSTANDING PERSISTENT ATRIAL FIBRILLATION (HCC): ICD-10-CM

## 2021-12-09 PROBLEM — E78.5 DYSLIPIDEMIA: Status: ACTIVE | Noted: 2021-12-09

## 2021-12-09 PROBLEM — I48.91 ATRIAL FIBRILLATION (HCC): Status: ACTIVE | Noted: 2019-08-01

## 2021-12-09 PROBLEM — R09.02 HYPOXEMIA: Status: ACTIVE | Noted: 2021-12-09

## 2021-12-09 PROBLEM — E66.3 OVERWEIGHT WITH BODY MASS INDEX (BMI) 25.0-29.9: Status: ACTIVE | Noted: 2021-12-09

## 2021-12-09 PROBLEM — I10 HYPERTENSION: Status: ACTIVE | Noted: 2019-08-01

## 2021-12-09 PROBLEM — S42.413A SUPRACONDYLAR FRACTURE OF HUMERUS: Status: ACTIVE | Noted: 2021-12-09

## 2021-12-09 PROBLEM — Z87.891 PERSONAL HISTORY OF NICOTINE DEPENDENCE: Status: ACTIVE | Noted: 2021-12-09

## 2021-12-09 PROBLEM — K21.9 GASTROESOPHAGEAL REFLUX DISEASE: Status: ACTIVE | Noted: 2021-12-09

## 2021-12-09 PROBLEM — R31.29 MICROSCOPIC HEMATURIA: Status: ACTIVE | Noted: 2021-12-09

## 2021-12-09 PROBLEM — K55.20 ANGIODYSPLASIA OF INTESTINE: Status: ACTIVE | Noted: 2021-12-09

## 2021-12-09 PROBLEM — E53.8 B12 DEFICIENCY: Status: ACTIVE | Noted: 2020-11-06

## 2021-12-09 PROBLEM — N39.41 URGE INCONTINENCE OF URINE: Status: ACTIVE | Noted: 2021-12-09

## 2021-12-09 PROBLEM — R32 INCONTINENCE: Status: ACTIVE | Noted: 2021-12-09

## 2021-12-09 PROBLEM — R39.9 SYMPTOMS INVOLVING URINARY SYSTEM: Status: ACTIVE | Noted: 2021-12-09

## 2021-12-09 PROBLEM — D64.9 ANEMIA: Status: ACTIVE | Noted: 2021-12-09

## 2021-12-09 PROBLEM — N35.12 POSTINFECTIVE URETHRAL STRICTURE IN FEMALE: Status: ACTIVE | Noted: 2021-12-09

## 2021-12-09 LAB — HBA1C MFR BLD: 5.5 %

## 2021-12-09 PROCEDURE — G8427 DOCREV CUR MEDS BY ELIG CLIN: HCPCS | Performed by: FAMILY MEDICINE

## 2021-12-09 PROCEDURE — 1123F ACP DISCUSS/DSCN MKR DOCD: CPT | Performed by: FAMILY MEDICINE

## 2021-12-09 PROCEDURE — 2022F DILAT RTA XM EVC RTNOPTHY: CPT | Performed by: FAMILY MEDICINE

## 2021-12-09 PROCEDURE — 1090F PRES/ABSN URINE INCON ASSESS: CPT | Performed by: FAMILY MEDICINE

## 2021-12-09 PROCEDURE — 99203 OFFICE O/P NEW LOW 30 MIN: CPT | Performed by: FAMILY MEDICINE

## 2021-12-09 PROCEDURE — G8400 PT W/DXA NO RESULTS DOC: HCPCS | Performed by: FAMILY MEDICINE

## 2021-12-09 PROCEDURE — 3017F COLORECTAL CA SCREEN DOC REV: CPT | Performed by: FAMILY MEDICINE

## 2021-12-09 PROCEDURE — 83036 HEMOGLOBIN GLYCOSYLATED A1C: CPT | Performed by: FAMILY MEDICINE

## 2021-12-09 PROCEDURE — 1036F TOBACCO NON-USER: CPT | Performed by: FAMILY MEDICINE

## 2021-12-09 PROCEDURE — G8484 FLU IMMUNIZE NO ADMIN: HCPCS | Performed by: FAMILY MEDICINE

## 2021-12-09 PROCEDURE — 3044F HG A1C LEVEL LT 7.0%: CPT | Performed by: FAMILY MEDICINE

## 2021-12-09 PROCEDURE — G8417 CALC BMI ABV UP PARAM F/U: HCPCS | Performed by: FAMILY MEDICINE

## 2021-12-09 PROCEDURE — 4040F PNEUMOC VAC/ADMIN/RCVD: CPT | Performed by: FAMILY MEDICINE

## 2021-12-09 PROCEDURE — G8926 SPIRO NO PERF OR DOC: HCPCS | Performed by: FAMILY MEDICINE

## 2021-12-09 PROCEDURE — 3023F SPIROM DOC REV: CPT | Performed by: FAMILY MEDICINE

## 2021-12-09 RX ORDER — FLUTICASONE FUROATE, UMECLIDINIUM BROMIDE AND VILANTEROL TRIFENATATE 100; 62.5; 25 UG/1; UG/1; UG/1
1 POWDER RESPIRATORY (INHALATION) DAILY
Qty: 1 EACH | Refills: 0
Start: 2021-12-09

## 2021-12-09 RX ORDER — LISINOPRIL 10 MG/1
10 TABLET ORAL DAILY
COMMUNITY

## 2021-12-09 RX ORDER — ESTRADIOL 0.1 MG/G
CREAM VAGINAL
COMMUNITY
Start: 2021-06-01

## 2021-12-09 SDOH — ECONOMIC STABILITY: FOOD INSECURITY: WITHIN THE PAST 12 MONTHS, YOU WORRIED THAT YOUR FOOD WOULD RUN OUT BEFORE YOU GOT MONEY TO BUY MORE.: OFTEN TRUE

## 2021-12-09 SDOH — ECONOMIC STABILITY: FOOD INSECURITY: WITHIN THE PAST 12 MONTHS, THE FOOD YOU BOUGHT JUST DIDN'T LAST AND YOU DIDN'T HAVE MONEY TO GET MORE.: OFTEN TRUE

## 2021-12-09 ASSESSMENT — PATIENT HEALTH QUESTIONNAIRE - PHQ9
SUM OF ALL RESPONSES TO PHQ QUESTIONS 1-9: 0
1. LITTLE INTEREST OR PLEASURE IN DOING THINGS: 0
SUM OF ALL RESPONSES TO PHQ QUESTIONS 1-9: 0
SUM OF ALL RESPONSES TO PHQ9 QUESTIONS 1 & 2: 0
SUM OF ALL RESPONSES TO PHQ QUESTIONS 1-9: 0
2. FEELING DOWN, DEPRESSED OR HOPELESS: 0

## 2021-12-09 ASSESSMENT — ENCOUNTER SYMPTOMS
SHORTNESS OF BREATH: 0
PHOTOPHOBIA: 0
ABDOMINAL PAIN: 0
ABDOMINAL DISTENTION: 0
CHEST TIGHTNESS: 0

## 2021-12-09 ASSESSMENT — SOCIAL DETERMINANTS OF HEALTH (SDOH): HOW HARD IS IT FOR YOU TO PAY FOR THE VERY BASICS LIKE FOOD, HOUSING, MEDICAL CARE, AND HEATING?: SOMEWHAT HARD

## 2021-12-09 NOTE — PROGRESS NOTES
Diagnosis Date    Arthritis     CAD (coronary artery disease)     cardiac stents x 3    Cancer ([facility]     right breast    COPD (chronic obstructive pulmonary disease) (HCC)     smoker since age 10    Diabetes mellitus ([facility]     hx > 30 yrs    GERD (gastroesophageal reflux disease)     History of blood transfusion 2019    blood transfusions x 3 due to lower GI bleed / [facility] Hyperlipidemia     meds > 10 yrs    Hypertension     meds > 10 yrs    PVD (peripheral vascular disease) ([facility]     both legs     Past Surgical History:   Procedure Laterality Date    BREAST SURGERY Right     mastectomy due to malignancy / chemo to follow    CARDIAC CATHETERIZATION      COLONOSCOPY  2016    Devon Bond MD    CORONARY ANGIOPLASTY WITH STENT PLACEMENT      cardiac stents x 3    ENDOSCOPY, COLON, DIAGNOSTIC      EYE SURGERY      Phaco with IOL OS    HYSTERECTOMY      MYRINGOTOMY AND TYMPANOSTOMY TUBE PLACEMENT Bilateral 2019    BILATERAL MYRINGOTOMY WITH VENTILATING  TUBE INSERTION performed by Jenn White MD at [address] [place] [address]      as child    TUNNELED VENOUS PORT PLACEMENT      used for Iron infusions     Social History     Socioeconomic History    Marital status:      Spouse name: Not on file    Number of children: Not on file    Years of education: Not on file    Highest education level: Not on file   Occupational History    Not on file   Tobacco Use    Smoking status: Former Smoker     Packs/day: 0.50     Years: 50.00     Pack years: 25.00     Types: Cigarettes     Quit date: 2019     Years since quittin.6    Smokeless tobacco: Never Used   Vaping Use    Vaping Use: Never used   Substance and Sexual Activity    Alcohol use: No    Drug use: No    Sexual activity: Not on file   Other Topics Concern    Not on file   Social History Narrative    Not on file     Social Determinants of Health     Financial Resource Strain: Medium Risk    Difficulty of Paying Living Expenses: Somewhat hard   Food Insecurity: Food Insecurity Present    Worried About Running Out of Food in the Last Year: Often true    Damián of Food in the Last Year: Often true   Transportation Needs:     Lack of Transportation (Medical): Not on file    Lack of Transportation (Non-Medical): Not on file   Physical Activity:     Days of Exercise per Week: Not on file    Minutes of Exercise per Session: Not on file   Stress:     Feeling of Stress : Not on file   Social Connections:     Frequency of Communication with Friends and Family: Not on file    Frequency of Social Gatherings with Friends and Family: Not on file    Attends Muslim Services: Not on file    Active Member of Clubs or Organizations: Not on file    Attends Club or Organization Meetings: Not on file    Marital Status: Not on file   Intimate Partner Violence:     Fear of Current or Ex-Partner: Not on file    Emotionally Abused: Not on file    Physically Abused: Not on file    Sexually Abused: Not on file   Housing Stability:     Unable to Pay for Housing in the Last Year: Not on file    Number of [place] in the Last Year: Not on file    Unstable Housing in the Last Year: Not on file     Family History   Problem Relation Age of Onset    Breast Cancer Mother     Diabetes Mother     Stroke Mother     High Blood Pressure Father     Heart Disease Father     High Cholesterol Father     Diabetes Father     Stroke Father     Cancer Sister         oral cancer    Diabetes Brother     Kidney Disease Brother     Alcohol Abuse Brother         liver problems    Cancer Brother         liver & lung cancer    Other Son         PTSD / blood dyscrasia    Cancer Sister         brain & lung cancer    Other Son         MVA at age 29     Allergies:  Diltiazem    Review of Systems   Constitutional: Negative for activity change, appetite change, diaphoresis and unexpected weight change.    Eyes: Negative for photophobia and visual disturbance. Respiratory: Negative for chest tightness and shortness of breath. No orthopnea   Cardiovascular: Negative for chest pain, palpitations and leg swelling. Gastrointestinal: Negative for abdominal distention and abdominal pain. Genitourinary: Negative for flank pain and frequency. Musculoskeletal: Negative for gait problem and joint swelling. Neurological: Negative for dizziness, weakness, light-headedness and headaches. Psychiatric/Behavioral: Negative for confusion. Objective:   /60   Pulse 59   Temp 97.5 °F (36.4 °C)   Ht 5' 1.75\" (1.568 m)   Wt 142 lb 3.2 oz (64.5 kg)   SpO2 96%   BMI 26.22 kg/m²     Physical Exam  Constitutional:       General: She is not in acute distress. Appearance: She is well-developed. She is not diaphoretic. HENT:      Head: Normocephalic and atraumatic. Right Ear: External ear normal.      Left Ear: External ear normal.      Nose: Nose normal.   Eyes:      General:         Right eye: No discharge. Left eye: No discharge. Conjunctiva/sclera: Conjunctivae normal.      Pupils: Pupils are equal, round, and reactive to light. Neck:      Thyroid: No thyromegaly. Trachea: No tracheal deviation. Cardiovascular:      Rate and Rhythm: Normal rate and regular rhythm. Heart sounds: Normal heart sounds. Pulmonary:      Effort: Pulmonary effort is normal. No respiratory distress. Breath sounds: Normal breath sounds. Abdominal:      General: There is no distension. Musculoskeletal:      Cervical back: Neck supple. Skin:     General: Skin is warm and dry. Findings: No bruising or rash. Neurological:      Mental Status: She is alert. Coordination: Coordination normal.   Psychiatric:         Thought Content:  Thought content normal.         Judgment: Judgment normal.         Results for POC orders placed in visit on 12/09/21   POCT glycosylated hemoglobin (Hb A1C) Result Value Ref Range    Hemoglobin A1C 5.5 %       Recent Results (from the past 2016 hour(s))   POCT glycosylated hemoglobin (Hb A1C)    Collection Time: 12/09/21 10:03 AM   Result Value Ref Range    Hemoglobin A1C 5.5 %       [] Pt was seen by provider for      Minutes  Counseling and coordination of care was done for all assessment diagnosis listed for today with patient and any family/friend present. More than 50% of this visit was spent coordinating cuurent care, obtaining information for prior records, and counseling for current plan of action. Assessment:       Diagnosis Orders   1. Type 2 diabetes mellitus without complication, without long-term current use of insulin ([facility])  POCT glycosylated hemoglobin (Hb A1C)   2. Pulmonary emphysema, unspecified emphysema type ([facility]  fluticasone-umeclidin-vilant (TRELEGY ELLIPTA) 100-62.5-25 MCG/INH AEPB         Orders Placed This Encounter   Procedures    POCT glycosylated hemoglobin (Hb A1C)       Orders Placed This Encounter   Medications    fluticasone-umeclidin-vilant (TRELEGY ELLIPTA) 100-62.5-25 MCG/INH AEPB     Sig: Inhale 1 puff into the lungs daily     Dispense:  1 each     Refill:  0          Medication List          Accurate as of December 9, 2021 10:10 AM. If you have any questions, ask your nurse or doctor.             START taking these medications    Trelegy Ellipta 100-62.5-25 MCG/INH Aepb  Generic drug: fluticasone-umeclidin-vilant  Inhale 1 puff into the lungs daily  Started by: Tripp Bhagat MD        CONTINUE taking these medications    albuterol (2.5 MG/3ML) 0.083% nebulizer solution  Commonly known as: PROVENTIL     aspirin 81 MG tablet     estradiol 0.1 MG/GM vaginal cream  Commonly known as: ESTRACE     fluticasone 50 MCG/ACT nasal spray  Commonly known as: FLONASE     Lipitor 80 MG tablet  Generic drug: atorvastatin     lisinopril 10 MG tablet  Commonly known as: PRINIVIL;ZESTRIL     metFORMIN 1000 MG tablet  Commonly known as: GLUCOPHAGE     Minitran 0.4 MG/HR  Generic drug: nitroGLYCERIN     mirabegron 50 MG Tb24  Commonly known as: MYRBETRIQ     sotalol 80 MG tablet  Commonly known as: BETAPACE        STOP taking these medications    insulin lispro protamine & lispro (75-25) 100 UNIT per ML Susp injection vial  Commonly known as: HUMALOG MIX  Stopped by: Raj Kuhn MD     Qvar 80 MCG/ACT inhaler  Generic drug: beclomethasone  Stopped by: Raj Kuhn MD     Tessalon Perles 100 MG capsule  Generic drug: benzonatate  Stopped by: Raj Kuhn MD     Vitamin D3 1.25 MG (58933 UT) Caps  Stopped by: Raj Kuhn MD           Where to Get Your Medications      Information about where to get these medications is not yet available    Ask your nurse or doctor about these medications  · Trelegy Ellipta 100-62.5-25 MCG/INH Aepb           Plan:   Return for MAW exam due early Jan .    Patient Instructions   Food and Meal Resources    [address] [place] [address] of [address] [place] [address]  Call 211 or  wwwDeltekSt. Luke's Boise Medical CenterCaribe Spectrum Holdings    [facility] Regional[facility]. [facility])  Call - [phone]  www.[employer]. [employer]      [employer] / [employer] on [facility]  [address] [facility] [address]   [phone]  *regular & [address] [facility]. [address] [facility] [address] [facility]  [phone]  *regular & special diets  60+ [facility] on [address]  [address] [facility]. [address]  [phone], ext. [phone] [facility] on [address] [facility]. [place], [address] [facility]  [phone]  *regular & special diets  [place], [address] [place] [address] and [place] [facility] on [address] [facility]. [address]  [place], [address]  [address] [place] [address]  [phone]  [place] only    [address] on Aging  [address] [facility]. [place], [address] [facility]  [phone]  61 + and/or disables    Family and Housing Resources    [address] [place] [address] Drive of [address] [place] [address]  Call 211  or - www. 211lorainRiverMeadow [employer] [address] [place] [address])  Call - [phone]  www.[employer]. [employer]    [facility]  [address]  [facility] [facility] [address]  [address] [facility], [facility] [address]  [phone] /[phone]    Medicaid Application  Https://benefits. [place].gov/  [phone]    Home Energy Assistance Programs (HEAP)  [address]. [facility] [address] [facility]  [phone]    [facility] ( [facility])  [address] [facility] [facility] [address], [address] [facility]  [address], [address] [facility]  [address] [facility]  www. [employer]    [employer]  [address] [place] [address], [facility] [address]  [address] [facility]  [address] [place] [address]  [address] [facility] at [address] [facility]      This note was partially created with the assistance of dictation. This may lead to grammatical or spelling errors. Noé Leblanc M.D.

## 2021-12-09 NOTE — PATIENT INSTRUCTIONS
Food and Meal Resources    [employer] of [address] [place] [address]  Call 211 or  [email or web address]. [email or web address]    [facility]. [address] [place] [facility])  Call - [phone]  [email or web address]      [employer] / [employer] on [facility]  [address]   [phone]  *regular & [address]. [address] [facility], [address]  [phone]  *regular & special diets  60+ [facility] on [address]  [address] [place] [address]. [address]  [phone], ext. [phone] [facility] on [address] [facility]. [facility], [address] [facility]  [phone]  *regular & special diets  [address] and [place] only    [facility] on [address] [facility]. [address]  Dawn, [address] [facility] [address]  [address] [place] [address] Road  [phone]  [facility] 109 only    [address] [facility] on [address] [place]. Dawn, [address] [facility] [address]  [phone]  61 + and/or disables    Family and Housing Resources    [employer] of [address] [place] [address]  Call 211  or - www. [facility] Hospital[facility]olkstr. [address] [place] [address])  Call - [phone]  [email or web address]    [place] [address] [facility] [address]  [address]  [place] [address] [facility] [address]  [phone] /[phone]    Medicaid Application  [email or web address] [email or web address]  [phone]    Home Energy Assistance Programs (HEAP)  [address]. [facility], [address] [place] [address]  [phone]    [facility] ( shelter)  [address] [facility] [address], [address]    [facility] (alf)  [address] [facility]  [address] [facility]  www. [facility] Cent[facility]Works. [address] [facility], [facility] [address]  [address]  [address] Lana RodgersSierra View District Hospitalstephen [address] [address] [facility]. [facility] at [address] [facility]

## 2022-01-10 ENCOUNTER — OFFICE VISIT (OUTPATIENT)
Dept: FAMILY MEDICINE CLINIC | Age: 72
End: 2022-01-10
Payer: MEDICARE

## 2022-01-10 VITALS
WEIGHT: 148 LBS | HEART RATE: 72 BPM | SYSTOLIC BLOOD PRESSURE: 110 MMHG | TEMPERATURE: 97.6 F | DIASTOLIC BLOOD PRESSURE: 60 MMHG | HEIGHT: 62 IN | OXYGEN SATURATION: 97 % | BODY MASS INDEX: 27.23 KG/M2

## 2022-01-10 DIAGNOSIS — I48.11 LONGSTANDING PERSISTENT ATRIAL FIBRILLATION (HCC): ICD-10-CM

## 2022-01-10 DIAGNOSIS — Z00.00 MEDICARE ANNUAL WELLNESS VISIT, SUBSEQUENT: Primary | ICD-10-CM

## 2022-01-10 DIAGNOSIS — E11.9 TYPE 2 DIABETES MELLITUS WITHOUT COMPLICATION, WITHOUT LONG-TERM CURRENT USE OF INSULIN (HCC): ICD-10-CM

## 2022-01-10 DIAGNOSIS — I73.9 PERIPHERAL VASCULAR DISEASE (HCC): ICD-10-CM

## 2022-01-10 DIAGNOSIS — J43.9 PULMONARY EMPHYSEMA, UNSPECIFIED EMPHYSEMA TYPE (HCC): ICD-10-CM

## 2022-01-10 DIAGNOSIS — Z12.31 BREAST CANCER SCREENING BY MAMMOGRAM: ICD-10-CM

## 2022-01-10 PROCEDURE — G0439 PPPS, SUBSEQ VISIT: HCPCS | Performed by: FAMILY MEDICINE

## 2022-01-10 PROCEDURE — 3046F HEMOGLOBIN A1C LEVEL >9.0%: CPT | Performed by: FAMILY MEDICINE

## 2022-01-10 PROCEDURE — 1123F ACP DISCUSS/DSCN MKR DOCD: CPT | Performed by: FAMILY MEDICINE

## 2022-01-10 PROCEDURE — 4040F PNEUMOC VAC/ADMIN/RCVD: CPT | Performed by: FAMILY MEDICINE

## 2022-01-10 PROCEDURE — 3017F COLORECTAL CA SCREEN DOC REV: CPT | Performed by: FAMILY MEDICINE

## 2022-01-10 PROCEDURE — 99497 ADVNCD CARE PLAN 30 MIN: CPT | Performed by: FAMILY MEDICINE

## 2022-01-10 PROCEDURE — G8484 FLU IMMUNIZE NO ADMIN: HCPCS | Performed by: FAMILY MEDICINE

## 2022-01-10 ASSESSMENT — PATIENT HEALTH QUESTIONNAIRE - PHQ9
2. FEELING DOWN, DEPRESSED OR HOPELESS: 0
SUM OF ALL RESPONSES TO PHQ QUESTIONS 1-9: 0
SUM OF ALL RESPONSES TO PHQ9 QUESTIONS 1 & 2: 0
SUM OF ALL RESPONSES TO PHQ QUESTIONS 1-9: 0
1. LITTLE INTEREST OR PLEASURE IN DOING THINGS: 0
SUM OF ALL RESPONSES TO PHQ QUESTIONS 1-9: 0
SUM OF ALL RESPONSES TO PHQ QUESTIONS 1-9: 0

## 2022-01-10 ASSESSMENT — LIFESTYLE VARIABLES: HOW OFTEN DO YOU HAVE A DRINK CONTAINING ALCOHOL: 0

## 2022-01-10 NOTE — PATIENT INSTRUCTIONS
Personalized Preventive Plan for Maribel Villasenor - 1/10/2022  Medicare offers a range of preventive health benefits. Some of the tests and screenings are paid in full while other may be subject to a deductible, co-insurance, and/or copay. Some of these benefits include a comprehensive review of your medical history including lifestyle, illnesses that may run in your family, and various assessments and screenings as appropriate. After reviewing your medical record and screening and assessments performed today your provider may have ordered immunizations, labs, imaging, and/or referrals for you. A list of these orders (if applicable) as well as your Preventive Care list are included within your After Visit Summary for your review. Other Preventive Recommendations:    · A preventive eye exam performed by an eye specialist is recommended every 1-2 years to screen for glaucoma; cataracts, macular degeneration, and other eye disorders. · A preventive dental visit is recommended every 6 months. · Try to get at least 150 minutes of exercise per week or 10,000 steps per day on a pedometer . · Order or download the FREE \"Exercise & Physical Activity: Your Everyday Guide\" from [employer]. Call 1-450.861.6560 or search [employer] online. · You need 4030-2820 mg of calcium and 6567-3021 IU of vitamin D per day. It is possible to meet your calcium requirement with diet alone, but a vitamin D supplement is usually necessary to meet this goal.  · When exposed to the sun, use a sunscreen that protects against both UVA and UVB radiation with an SPF of 30 or greater. Reapply every 2 to 3 hours or after sweating, drying off with a towel, or swimming. · Always wear a seat belt when traveling in a car. Always wear a helmet when riding a bicycle or motorcycle.

## 2022-01-10 NOTE — PROGRESS NOTES
Medicare Annual Wellness Visit  Name: Layla Canavan Date: 1/10/2022   MRN: [id number] Sex: Female   Age: 66 y.o. Ethnicity: Non- / Non    : 1950 Race: White (non-)      Micheline Frankel is here for Medicare AWV      Up  To date with f/u with all specialty, no med changes and no increase in symptoms confirmed specialist in [place] of care      Screenings for behavioral, psychosocial and functional/safety risks, and cognitive dysfunction are all negative except as indicated below. These results, as well as other patient data from the [address] form, are documented in Flowsheets linked to this Encounter. Allergies   Allergen Reactions    Diltiazem Hives         Prior to Visit Medications    Medication Sig Taking? Authorizing Provider   estradiol (ESTRACE) 0.1 MG/GM vaginal cream Vaginally.   Finger tip dose every other night Yes Historical Provider, MD   lisinopril (PRINIVIL;ZESTRIL) 10 MG tablet Take 10 mg by mouth daily Yes Historical Provider, MD   fluticasone-umeclidin-vilant (TRELEGY ELLIPTA) 100-62.5-25 MCG/INH AEPB Inhale 1 puff into the lungs daily Yes Devin Shrestha MD   aspirin 81 MG tablet Take 81 mg by mouth daily Indications: every other day  Yes Historical Provider, MD   sotalol (BETAPACE) 80 MG tablet Take 120 mg by mouth 2 times daily Yes Historical Provider, MD   albuterol (PROVENTIL) (2.5 MG/3ML) 0.083% nebulizer solution Take 2.5 mg by nebulization every 6 hours as needed for Wheezing Yes Historical Provider, MD   Mirabegron ER 50 MG TB24 Take by mouth daily Yes Historical Provider, MD   atorvastatin (LIPITOR) 80 MG tablet Take 40 mg by mouth every evening  Yes Historical Provider, MD   fluticasone (FLONASE) 50 MCG/ACT nasal spray 1 spray Yes Historical Provider, MD   metFORMIN (GLUCOPHAGE) 1000 MG tablet Take 1,000 mg by mouth 2 times daily (with meals)  Yes Historical Provider, MD   nitroGLYCERIN (MINITRAN) 0.4 MG/HR Place under the tongue Yes Historical Provider, MD         Past Medical History:   Diagnosis Date    Arthritis     CAD (coronary artery disease)     cardiac stents x 3    Cancer ([facility]     right breast    COPD (chronic obstructive pulmonary disease) ([facility]     smoker since age 10    Diabetes [facility] ([facility]     hx > 30 yrs    GERD (gastroesophageal reflux disease)     History of blood transfusion 07/2019    blood transfusions x 3 due to lower GI bleed / [address] Hyperlipidemia     meds > 10 yrs    Hypertension     meds > 10 yrs    PVD (peripheral vascular disease) ([facility]     both legs       Past Surgical History:   Procedure Laterality Date    BREAST SURGERY Right 1996    mastectomy due to malignancy / chemo to follow    CARDIAC CATHETERIZATION      COLONOSCOPY  08/25/2016    Elvia Self MD    CORONARY ANGIOPLASTY WITH STENT PLACEMENT      cardiac stents x 3    ENDOSCOPY, COLON, DIAGNOSTIC      EYE SURGERY      Phaco with IOL OS    HYSTERECTOMY  1968    MYRINGOTOMY AND TYMPANOSTOMY TUBE PLACEMENT Bilateral 8/8/2019    BILATERAL MYRINGOTOMY WITH VENTILATING  TUBE INSERTION performed by Aleida Balderrama MD at [address]      as child    TUNNELED VENOUS PORT PLACEMENT  2015    used for Iron infusions         Family History   Problem Relation Age of Onset    Breast Cancer Mother     Diabetes Mother     Stroke Mother     High Blood Pressure Father     Heart Disease Father     High Cholesterol Father     Diabetes Father     Stroke Father     Cancer Sister         oral cancer    Diabetes Brother     Kidney Disease Brother     Alcohol Abuse Brother         liver problems    Cancer Brother         liver & lung cancer    Other Son         PTSD / blood dyscrasia    Cancer Sister         brain & lung cancer    Other Son         MVA at age 29       CareTeam (Including outside providers/suppliers regularly involved in providing care):   Patient Care Team:  Melba Reyes MD as PCP - General (Family Medicine)  Melba Reyes MD as PCP - REHABILITATION [facility] Empaneled Provider  Ronen Rider MD as Consulting Physician (Pulmonology)  Ap Briceno MD as Consulting Physician (Urology)  Alia Moscoso MD as Consulting Physician (Internal Medicine)    Wt Readings from Last 3 Encounters:   01/10/22 148 lb (67.1 kg)   12/09/21 142 lb 3.2 oz (64.5 kg)   10/12/19 149 lb (67.6 kg)     Vitals:    01/10/22 1253   BP: 110/60   Pulse: 72   Temp: 97.6 °F (36.4 °C)   SpO2: 97%   Weight: 148 lb (67.1 kg)   Height: 5' 1.75\" (1.568 m)     Body mass index is 27.29 kg/m². Based upon direct observation of the patient, evaluation of cognition reveals recent and remote memory intact. Patient's complete Health Risk Assessment and screening values have been reviewed and are found in Flowsheets. The following problems were reviewed today and where indicated follow up appointments were made and/or referrals ordered. Positive Risk Factor Screenings with Interventions:              General Health and ACP:  General  In general, how would you say your health is?: Good  In the past 7 days, have you experienced any of the following?  New or Increased Pain, New or Increased Fatigue, Loneliness, Social Isolation, Stress or Anger?: (!) New or Increased Fatigue  Do you get the social and emotional support that you need?: Yes  Do you have a Living Will?: Yes  Advance Directives     Power of  Living Will ACP-Advance Directive ACP-Power of     Not on File Filed on 08/01/19 Filed Not on File      General Health Risk Interventions:  · acp consulted           [address] (ACP) Physician/NP/PA Conversation    Date of Conversation: 1/10/2022  Conducted with: Patient with Decision Making Capacity    Healthcare Decision Maker:    Primary Decision Maker: Gagandeep Caruso - Spouse - [phone]  Click here to complete [address] including selection of the Healthcare Decision Maker Relationship (ie \"Primary\"). Today we referred to ACP Clinical Specialist for assistance. Care Preferences:    Hospitalization: \"If your health worsens and it becomes clear that your chance of recovery is unlikely, what would be your preference regarding hospitalization? \"  The patient would prefer comfort-focused treatment without hospitalization. Ventilation: \"If you were unable to breath on your own and your chance of recovery was unlikely, what would be your preference about the use of a ventilator (breathing machine) if it was available to you? \"  The patient would NOT desire the use of a ventilator. Resuscitation: \"In the event your heart stopped as a result of an underlying serious health condition, would you want attempts made to restart your heart, or would you prefer a natural death? \"  No, do NOT attempt to resuscitate.     benefit/burden of treatment options    Conversation Outcomes / Follow-Up Plan:  ACP incomplete - refer to ACP Clinical Specialist  Reviewed DNR/DNI and patient confirms current DNR status - completed forms on file (place new order if needed)    Length of Voluntary ACP Conversation in minutes:  16 minutes    Joy Rodriguez MD                 Personalized Preventive Plan   Current Health Maintenance Status  Immunization History   Administered Date(s) Administered    COVID-19, J&J, PF, 0.5 mL 07/14/2021    Influenza Virus Vaccine 10/16/2012, 10/08/2013, 10/01/2020    Influenza, High Dose (Fluzone 65 yrs and older) 11/10/2015, 11/02/2016, 09/13/2017, 09/11/2018, 10/31/2019    Influenza, High-dose, Quadv, 65 yrs +, IM (Fluzone) 11/12/2020, 11/30/2021    Pneumococcal Polysaccharide ([id number]) 10/16/2012        Health Maintenance   Topic Date Due    Hepatitis C screen  Never done    Diabetic foot exam  Never done    Lipid screen  Never done    Diabetic microalbuminuria test  Never done    Diabetic retinal exam  Never done    DTaP/Tdap/Td vaccine (1 - Tdap) Never done    Shingles Vaccine (1 of 2) Never done    Pneumococcal 65+ years Vaccine (1 of 1 - PPSV23) 10/16/2017    Annual Wellness Visit (AWV)  Never done    Low dose CT lung screening  10/12/2020    COVID-19 Vaccine (2 - Booster for [employer] series) 09/08/2021    Breast cancer screen  12/30/2021    Potassium monitoring  07/13/2022    Creatinine monitoring  07/13/2022    A1C test (Diabetic or Prediabetic)  12/09/2022    Depression Screen  12/09/2022    Colon cancer screen colonoscopy  07/01/2029    DEXA (modify frequency per FRAX score)  Completed    Flu vaccine  Completed    Hepatitis A vaccine  Aged Out    Hib vaccine  Aged Out    Meningococcal (ACWY) vaccine  Aged Out     Recommendations for [place] Due: see orders and patient instructions/AVS.  . Recommended screening schedule for the next 5-10 years is provided to the patient in written form: see Patient Davis Mcgarth was seen today for medicare awv. Diagnoses and all orders for this visit:    Medicare annual wellness visit, subsequent  -     [facility] to [facility] Clinical Specialist    Breast cancer screening by mammogram  -     FABIAN DIGITAL SCREEN W OR WO CAD BILATERAL;  Future    Pulmonary emphysema, unspecified emphysema type ([address]    Peripheral vascular disease (HCC)    Longstanding persistent atrial fibrillation ([address]    Type 2 diabetes mellitus without complication, without long-term current use of insulin ([address]

## 2022-01-11 ENCOUNTER — CLINICAL DOCUMENTATION (OUTPATIENT)
Dept: SPIRITUAL SERVICES | Age: 72
End: 2022-01-11

## 2022-01-11 NOTE — PROGRESS NOTES
Advance Care Planning   Ambulatory ACP Specialist Patient Outreach    Date:  1/11/2022  ACP Specialist:  Sarah Owusu    Outreach call to patient in follow-up to ACP Specialist referral from: Devin Shrestha MD    [x] PCP  [] Provider   [] Ambulatory Care Management [] Other for Reason:    [x] Advance Directive Assistance  [] Code Status Discussion  [] Complete Portable DNR Order  [] Discuss Goals of Care  [] Complete POST/MOST  [] Early ACP Decision-Making  [] Other    Date Referral Received:1/10/22    Today's Outreach:  [x] First   [] Second  [] Third                               Third outreach made by [x]  phone  [] email []   MyChart     Intervention:  [] Spoke with Patient  [x] Left VM requesting return call      Outcome: Left detailed VM for Patient to call back. Patient does not have Email or MyChart on file. Next Step:   [] ACP scheduled conversation  [x] Outreach again in one week               [] Email / Mail ACP Info Sheets  [] Email / Mail Advance Directive            [] Close Referral. Routing closure to referring provider/staff and to ACP Specialist .      Thank you for this referral.

## 2022-01-21 ENCOUNTER — OFFICE VISIT (OUTPATIENT)
Dept: FAMILY MEDICINE CLINIC | Age: 72
End: 2022-01-21
Payer: MEDICARE

## 2022-01-21 VITALS
HEART RATE: 67 BPM | WEIGHT: 148 LBS | SYSTOLIC BLOOD PRESSURE: 112 MMHG | HEIGHT: 62 IN | TEMPERATURE: 97.6 F | DIASTOLIC BLOOD PRESSURE: 64 MMHG | OXYGEN SATURATION: 97 % | BODY MASS INDEX: 27.23 KG/M2

## 2022-01-21 DIAGNOSIS — S62.649A NONDISPLACED FRACTURE OF PROXIMAL PHALANX OF FINGER OF RIGHT HAND: Primary | ICD-10-CM

## 2022-01-21 DIAGNOSIS — T14.90XA INJURY: ICD-10-CM

## 2022-01-21 DIAGNOSIS — S60.221A TRAUMATIC ECCHYMOSIS OF RIGHT HAND, INITIAL ENCOUNTER: ICD-10-CM

## 2022-01-21 DIAGNOSIS — M79.89 SWELLING OF RIGHT HAND: ICD-10-CM

## 2022-01-21 PROCEDURE — G8417 CALC BMI ABV UP PARAM F/U: HCPCS | Performed by: NURSE PRACTITIONER

## 2022-01-21 PROCEDURE — 99213 OFFICE O/P EST LOW 20 MIN: CPT | Performed by: NURSE PRACTITIONER

## 2022-01-21 PROCEDURE — G8427 DOCREV CUR MEDS BY ELIG CLIN: HCPCS | Performed by: NURSE PRACTITIONER

## 2022-01-21 PROCEDURE — 1123F ACP DISCUSS/DSCN MKR DOCD: CPT | Performed by: NURSE PRACTITIONER

## 2022-01-21 PROCEDURE — 4040F PNEUMOC VAC/ADMIN/RCVD: CPT | Performed by: NURSE PRACTITIONER

## 2022-01-21 PROCEDURE — 1090F PRES/ABSN URINE INCON ASSESS: CPT | Performed by: NURSE PRACTITIONER

## 2022-01-21 PROCEDURE — G8484 FLU IMMUNIZE NO ADMIN: HCPCS | Performed by: NURSE PRACTITIONER

## 2022-01-21 PROCEDURE — 3017F COLORECTAL CA SCREEN DOC REV: CPT | Performed by: NURSE PRACTITIONER

## 2022-01-21 PROCEDURE — G8400 PT W/DXA NO RESULTS DOC: HCPCS | Performed by: NURSE PRACTITIONER

## 2022-01-21 PROCEDURE — 1036F TOBACCO NON-USER: CPT | Performed by: NURSE PRACTITIONER

## 2022-01-21 ASSESSMENT — ENCOUNTER SYMPTOMS: COLOR CHANGE: 1

## 2022-01-21 NOTE — PROGRESS NOTES
Subjective  Zaida Linder, 66 y.o. female presents today with:  Chief Complaint   Patient presents with    Fall     on ice trying to go get the mail,  x 1 day -    Hand Injury     right hand swollen & bruised        HPI   Presents to [facility] for injury   Affected area right hand   Callie Mueller yesterday on driveway d/t ice   Right hand swollen and bruised   Limited ROM d/t pain and inflammation   No open wounds or abrasions  Iced and elevated since injury   She has taken 800mg ibuprofen twice since injury occurred.  Does provide some relief                      Past Medical History:   Diagnosis Date    Arthritis     CAD (coronary artery disease)     cardiac stents x 3    Cancer ([facility]     right breast    COPD (chronic obstructive pulmonary disease) ([facility]     smoker since age 10    Diabetes mellitus ([facility]     hx > 30 yrs    GERD (gastroesophageal reflux disease)     History of blood transfusion 07/2019    blood transfusions x 3 due to lower GI bleed / [facility].    Hyperlipidemia     meds > 10 yrs    Hypertension     meds > 10 yrs    PVD (peripheral vascular disease) ([facility]     both legs      Past Surgical History:   Procedure Laterality Date    BREAST SURGERY Right 1996    mastectomy due to malignancy / chemo to follow    CARDIAC CATHETERIZATION      COLONOSCOPY  08/25/2016    Laci Donald MD    CORONARY ANGIOPLASTY WITH STENT PLACEMENT      cardiac stents x 3    ENDOSCOPY, COLON, DIAGNOSTIC      EYE SURGERY      Phaco with IOL OS    HYSTERECTOMY  1968    MYRINGOTOMY AND TYMPANOSTOMY TUBE PLACEMENT Bilateral 8/8/2019    BILATERAL MYRINGOTOMY WITH VENTILATING  TUBE INSERTION performed by Yesenia Greenfield MD at [address] [place] [address]      as child    TUNNELED VENOUS PORT PLACEMENT  2015    used for Iron infusions     Family History   Problem Relation Age of Onset    Breast Cancer Mother     Diabetes Mother     Stroke Mother     High Blood Pressure Father     Heart Disease Father     High Cholesterol Father     Diabetes Father     Stroke Father     Cancer Sister         oral cancer    Diabetes Brother     Kidney Disease Brother     Alcohol Abuse Brother         liver problems    Cancer Brother         liver & lung cancer    Other Son         PTSD / blood dyscrasia    Cancer Sister         brain & lung cancer    Other Son         MVA at age 29           Review of Systems   Constitutional: Positive for activity change. Negative for chills, diaphoresis, fatigue and fever. Musculoskeletal: Positive for arthralgias (right hand/wrist) and joint swelling. Skin: Positive for color change and wound. Neurological: Negative for dizziness, light-headedness and headaches. Psychiatric/Behavioral: Negative for sleep disturbance. PMH, Surgical Hx, Family Hx, and Social Hx reviewed and updated. Objective  Vitals:    01/21/22 1323   BP: 112/64   Pulse: 67   Temp: 97.6 °F (36.4 °C)   SpO2: 97%   Weight: 148 lb (67.1 kg)   Height: 5' 1.75\" (1.568 m)     BP Readings from Last 3 Encounters:   01/21/22 112/64   01/10/22 110/60   12/09/21 118/60     Wt Readings from Last 3 Encounters:   01/21/22 148 lb (67.1 kg)   01/10/22 148 lb (67.1 kg)   12/09/21 142 lb 3.2 oz (64.5 kg)         Physical Exam  Vitals reviewed. Constitutional:       General: She is not in acute distress. HENT:      Right Ear: External ear normal.      Left Ear: External ear normal.      Nose: Nose normal.   Eyes:      Conjunctiva/sclera: Conjunctivae normal.   Cardiovascular:      Rate and Rhythm: Normal rate. Pulses: Normal pulses. Pulmonary:      Effort: Pulmonary effort is normal.   Musculoskeletal:      Right hand: Swelling, tenderness and bony tenderness present. Decreased range of motion. Normal sensation. Normal capillary refill. Normal pulse. Cervical back: Normal range of motion. Skin:     General: Skin is warm. Capillary Refill: Capillary refill takes less than 2 seconds.       Findings: Bruising (right hand) and signs of injury (right hand) present. Neurological:      General: No focal deficit present. Mental Status: She is alert and oriented to person, place, and time. Assessment & Plan    Diagnosis Orders   1. Nondisplaced fracture of proximal phalanx of finger of right hand     2. Injury  XR HAND RIGHT (MIN 3 VIEWS)   3. Swelling of right hand  XR HAND RIGHT (MIN 3 VIEWS)   4. Traumatic ecchymosis of right hand, initial encounter  XR HAND RIGHT (MIN 3 VIEWS)     Orders Placed This Encounter   Procedures    XR HAND RIGHT (MIN 3 VIEWS)     Standing Status:   Future     Number of Occurrences:   1     Standing Expiration Date:   1/21/2023     Order Specific Question:   Reason for exam:     Answer:   injury     Order Specific Question:   Reason for exam:     Answer:   pain     No orders of the defined types were placed in this encounter. Follow up with Ortho as advised     Reviewed with the patient: current clinical status & XR. Pt sent to [facility] fracture clinic for evaluation/treatment of fracture. Driven to appt by family. How can you care for yourself at home? · If your doctor put a splint on as result of your injury, wear the splint exactly as directed. Do not remove it until your doctor says that you can. · Keep your hand raised above the level of your heart as much as you can. This will help reduce swelling. · Put ice or a cold pack on your finger for 10 to 20 minutes at a time. Try to do this every 1 to 2 hours for the next 3 days (when you are awake) or until the swelling goes down. Put a thin cloth between the ice and your skin. Keep the splint dry. · Be safe with medicines. Take pain medicines exactly as directed. Close follow up to evaluate treatment results and for coordination of care. I have reviewed the patient's medical history in detail and updated the computerized patient record.           GIULIANA Carbone Asa - NP

## 2022-01-24 ENCOUNTER — TELEPHONE (OUTPATIENT)
Dept: FAMILY MEDICINE CLINIC | Age: 72
End: 2022-01-24

## 2022-01-24 NOTE — TELEPHONE ENCOUNTER
Pt has A-fib  And copd   HR was 117. Wants to know If she should be worried or concerned.      Ph. [phone]

## 2022-01-25 NOTE — TELEPHONE ENCOUNTER
Spoke to pt states that her HR had come down to 75. Pt aware to call back with any further concerns.

## 2022-01-27 ENCOUNTER — CLINICAL DOCUMENTATION (OUTPATIENT)
Dept: SPIRITUAL SERVICES | Age: 72
End: 2022-01-27

## 2022-01-27 NOTE — ACP (ADVANCE CARE PLANNING)
Advance Care Planning   Ambulatory ACP Specialist Patient Outreach    Date:  1/27/2022  ACP Specialist:  Carmela Prince    Outreach call to patient in follow-up to ACP Specialist referral from: Rosamaria Borges MD    [x] PCP  [] Provider   [] Ambulatory Care Management [] Other for Reason:    [x] Advance Directive Assistance  [] Code Status Discussion  [] Complete Portable DNR Order  [] Discuss Goals of Care  [] Complete POST/MOST  [] Early ACP Decision-Making  [] Other    Date Referral Received:1/10/22    Today's Outreach:  [] First   [x] Second  [] Third                               Second outreach made by [x]  phone  [] email []   MyChart     Intervention:  [] Spoke with Patient  [x] Left VM requesting return call      Outcome: Left detailed VM for Patient to call back. Patient does not have Email or MyChart on file. Next Step:   [] ACP scheduled conversation  [x] Outreach again in two week               [] Email / Mail ACP Info Sheets  [] Email / Mail Advance Directive            [] Close Referral. Routing closure to referring provider/staff and to ACP Specialist .      Thank you for this referral.

## 2022-03-03 ENCOUNTER — CLINICAL DOCUMENTATION (OUTPATIENT)
Dept: SPIRITUAL SERVICES | Age: 72
End: 2022-03-03

## 2022-03-03 NOTE — ACP (ADVANCE CARE PLANNING)
Advance Care Planning   Ambulatory ACP Specialist Patient Outreach    Date:  3/3/2022  ACP Specialist:  Dyan Ny    Outreach call to patient in follow-up to ACP Specialist referral from: Alejo Dawson MD    [x] PCP  [] Provider   [] Ambulatory Care Management [] Other for Reason:    [x] Advance Directive Assistance  [] Code Status Discussion  [] Complete Portable DNR Order  [] Discuss Goals of Care  [] Complete POST/MOST  [] Early ACP Decision-Making  [] Other    Date Referral Received:1/10/22    Today's Outreach:  [] First   [] Second  [x] Third                               Third outreach made by [x]  phone  [] email []   MyChart     Intervention:  [] Spoke with Patient  [x] Left VM requesting return call      Outcome: Left VM for Patient to return call. Patient does not have Email or MyChart on file. Third Outreach. Next Step:   [] ACP scheduled conversation  [] Outreach again in one week               [] Email / Mail ACP Info Sheets  [] Email / Mail Advance Directive            [x] Close Referral. Routing closure to referring provider/staff and to ACP Specialist .      Thank you for this referral.

## 2022-05-05 ENCOUNTER — APPOINTMENT (OUTPATIENT)
Dept: CT IMAGING | Age: 72
DRG: 690 | End: 2022-05-05
Payer: MEDICARE

## 2022-05-05 ENCOUNTER — OFFICE VISIT (OUTPATIENT)
Dept: FAMILY MEDICINE CLINIC | Age: 72
End: 2022-05-05
Payer: MEDICARE

## 2022-05-05 ENCOUNTER — HOSPITAL ENCOUNTER (INPATIENT)
Age: 72
LOS: 2 days | Discharge: HOME | DRG: 690 | End: 2022-05-07
Attending: STUDENT IN AN ORGANIZED HEALTH CARE EDUCATION/TRAINING PROGRAM | Admitting: INTERNAL MEDICINE
Payer: MEDICARE

## 2022-05-05 VITALS
DIASTOLIC BLOOD PRESSURE: 60 MMHG | TEMPERATURE: 97.2 F | WEIGHT: 148 LBS | HEIGHT: 62 IN | BODY MASS INDEX: 27.23 KG/M2 | HEART RATE: 70 BPM | OXYGEN SATURATION: 97 % | SYSTOLIC BLOOD PRESSURE: 94 MMHG

## 2022-05-05 DIAGNOSIS — F17.200 TOBACCO DEPENDENCE: ICD-10-CM

## 2022-05-05 DIAGNOSIS — R11.2 NON-INTRACTABLE VOMITING WITH NAUSEA, UNSPECIFIED VOMITING TYPE: ICD-10-CM

## 2022-05-05 DIAGNOSIS — R11.11 NON-INTRACTABLE VOMITING WITHOUT NAUSEA, UNSPECIFIED VOMITING TYPE: Primary | ICD-10-CM

## 2022-05-05 DIAGNOSIS — E86.1 HYPOTENSION DUE TO HYPOVOLEMIA: ICD-10-CM

## 2022-05-05 DIAGNOSIS — R10.9 ABDOMINAL PAIN, UNSPECIFIED ABDOMINAL LOCATION: ICD-10-CM

## 2022-05-05 DIAGNOSIS — I95.89 HYPOTENSION DUE TO HYPOVOLEMIA: ICD-10-CM

## 2022-05-05 DIAGNOSIS — N30.01 ACUTE CYSTITIS WITH HEMATURIA: Primary | ICD-10-CM

## 2022-05-05 DIAGNOSIS — E86.0 DEHYDRATION: ICD-10-CM

## 2022-05-05 DIAGNOSIS — K90.49 FOOD INTOLERANCE IN ADULT: ICD-10-CM

## 2022-05-05 PROBLEM — N39.0 UTI (URINARY TRACT INFECTION): Status: ACTIVE | Noted: 2022-05-05

## 2022-05-05 LAB
ALBUMIN SERPL-MCNC: 3.8 G/DL (ref 3.5–4.6)
ALP BLD-CCNC: 94 U/L (ref 40–130)
ALT SERPL-CCNC: 11 U/L (ref 0–33)
AMPHETAMINE SCREEN, URINE: NORMAL
ANION GAP SERPL CALCULATED.3IONS-SCNC: 12 MEQ/L (ref 9–15)
AST SERPL-CCNC: 14 U/L (ref 0–35)
BACTERIA: ABNORMAL /HPF
BARBITURATE SCREEN URINE: NORMAL
BASOPHILS ABSOLUTE: 0.1 K/UL (ref 0–0.2)
BASOPHILS RELATIVE PERCENT: 1.2 %
BENZODIAZEPINE SCREEN, URINE: NORMAL
BILIRUB SERPL-MCNC: 0.3 MG/DL (ref 0.2–0.7)
BILIRUBIN URINE: NEGATIVE
BLOOD, URINE: ABNORMAL
BUN BLDV-MCNC: 23 MG/DL (ref 8–23)
C-REACTIVE PROTEIN: 3.9 MG/L (ref 0–5)
CALCIUM SERPL-MCNC: 9 MG/DL (ref 8.5–9.9)
CANNABINOID SCREEN URINE: NORMAL
CHLORIDE BLD-SCNC: 100 MEQ/L (ref 95–107)
CLARITY: ABNORMAL
CO2: 22 MEQ/L (ref 20–31)
COCAINE METABOLITE SCREEN URINE: NORMAL
COLOR: YELLOW
CREAT SERPL-MCNC: 1.19 MG/DL (ref 0.5–0.9)
EOSINOPHILS ABSOLUTE: 0.2 K/UL (ref 0–0.7)
EOSINOPHILS RELATIVE PERCENT: 2.3 %
EPITHELIAL CELLS, UA: ABNORMAL /HPF (ref 0–5)
GFR AFRICAN AMERICAN: 53.7
GFR NON-AFRICAN AMERICAN: 44.3
GLOBULIN: 2.8 G/DL (ref 2.3–3.5)
GLUCOSE BLD-MCNC: 115 MG/DL (ref 70–99)
GLUCOSE BLD-MCNC: 142 MG/DL (ref 70–99)
GLUCOSE BLD-MCNC: 93 MG/DL (ref 70–99)
GLUCOSE URINE: NEGATIVE MG/DL
HCT VFR BLD CALC: 38.7 % (ref 37–47)
HEMOGLOBIN: 12.7 G/DL (ref 12–16)
HYALINE CASTS: ABNORMAL /HPF (ref 0–5)
KETONES, URINE: NEGATIVE MG/DL
LACTIC ACID: 1.6 MMOL/L (ref 0.5–2.2)
LEUKOCYTE ESTERASE, URINE: ABNORMAL
LIPASE: 108 U/L (ref 12–95)
LYMPHOCYTES ABSOLUTE: 1.9 K/UL (ref 1–4.8)
LYMPHOCYTES RELATIVE PERCENT: 17.1 %
Lab: NORMAL
MAGNESIUM: 2.5 MG/DL (ref 1.7–2.4)
MCH RBC QN AUTO: 28.3 PG (ref 27–31.3)
MCHC RBC AUTO-ENTMCNC: 32.7 % (ref 33–37)
MCV RBC AUTO: 86.5 FL (ref 82–100)
METHADONE SCREEN, URINE: NORMAL
MONOCYTES ABSOLUTE: 0.8 K/UL (ref 0.2–0.8)
MONOCYTES RELATIVE PERCENT: 7.6 %
NEUTROPHILS ABSOLUTE: 7.8 K/UL (ref 1.4–6.5)
NEUTROPHILS RELATIVE PERCENT: 71.8 %
NITRITE, URINE: POSITIVE
OPIATE SCREEN URINE: NORMAL
OXYCODONE URINE: NORMAL
PDW BLD-RTO: 16.1 % (ref 11.5–14.5)
PERFORMED ON: ABNORMAL
PERFORMED ON: NORMAL
PH UA: 6 (ref 5–9)
PHENCYCLIDINE SCREEN URINE: NORMAL
PLATELET # BLD: 242 K/UL (ref 130–400)
POTASSIUM SERPL-SCNC: 4.3 MEQ/L (ref 3.4–4.9)
PROPOXYPHENE SCREEN: NORMAL
PROTEIN UA: ABNORMAL MG/DL
RBC # BLD: 4.48 M/UL (ref 4.2–5.4)
RBC UA: ABNORMAL /HPF (ref 0–5)
SODIUM BLD-SCNC: 134 MEQ/L (ref 135–144)
SPECIFIC GRAVITY UA: 1.02 (ref 1–1.03)
TOTAL CK: 56 U/L (ref 0–170)
TOTAL PROTEIN: 6.6 G/DL (ref 6.3–8)
URINE REFLEX TO CULTURE: YES
UROBILINOGEN, URINE: 0.2 E.U./DL
WBC # BLD: 10.9 K/UL (ref 4.8–10.8)
WBC UA: >100 /HPF (ref 0–5)

## 2022-05-05 PROCEDURE — G8427 DOCREV CUR MEDS BY ELIG CLIN: HCPCS | Performed by: FAMILY MEDICINE

## 2022-05-05 PROCEDURE — 3017F COLORECTAL CA SCREEN DOC REV: CPT | Performed by: FAMILY MEDICINE

## 2022-05-05 PROCEDURE — 94761 N-INVAS EAR/PLS OXIMETRY MLT: CPT

## 2022-05-05 PROCEDURE — 99214 OFFICE O/P EST MOD 30 MIN: CPT | Performed by: FAMILY MEDICINE

## 2022-05-05 PROCEDURE — 80307 DRUG TEST PRSMV CHEM ANLYZR: CPT

## 2022-05-05 PROCEDURE — 87086 URINE CULTURE/COLONY COUNT: CPT

## 2022-05-05 PROCEDURE — 2580000003 HC RX 258: Performed by: STUDENT IN AN ORGANIZED HEALTH CARE EDUCATION/TRAINING PROGRAM

## 2022-05-05 PROCEDURE — 6370000000 HC RX 637 (ALT 250 FOR IP): Performed by: INTERNAL MEDICINE

## 2022-05-05 PROCEDURE — 1123F ACP DISCUSS/DSCN MKR DOCD: CPT | Performed by: FAMILY MEDICINE

## 2022-05-05 PROCEDURE — G8417 CALC BMI ABV UP PARAM F/U: HCPCS | Performed by: FAMILY MEDICINE

## 2022-05-05 PROCEDURE — 97161 PT EVAL LOW COMPLEX 20 MIN: CPT

## 2022-05-05 PROCEDURE — 94640 AIRWAY INHALATION TREATMENT: CPT

## 2022-05-05 PROCEDURE — 6360000002 HC RX W HCPCS: Performed by: INTERNAL MEDICINE

## 2022-05-05 PROCEDURE — G8400 PT W/DXA NO RESULTS DOC: HCPCS | Performed by: FAMILY MEDICINE

## 2022-05-05 PROCEDURE — 4040F PNEUMOC VAC/ADMIN/RCVD: CPT | Performed by: FAMILY MEDICINE

## 2022-05-05 PROCEDURE — 6360000004 HC RX CONTRAST MEDICATION: Performed by: STUDENT IN AN ORGANIZED HEALTH CARE EDUCATION/TRAINING PROGRAM

## 2022-05-05 PROCEDURE — 2580000003 HC RX 258: Performed by: INTERNAL MEDICINE

## 2022-05-05 PROCEDURE — 83690 ASSAY OF LIPASE: CPT

## 2022-05-05 PROCEDURE — 6360000002 HC RX W HCPCS: Performed by: STUDENT IN AN ORGANIZED HEALTH CARE EDUCATION/TRAINING PROGRAM

## 2022-05-05 PROCEDURE — 1036F TOBACCO NON-USER: CPT | Performed by: FAMILY MEDICINE

## 2022-05-05 PROCEDURE — 94664 DEMO&/EVAL PT USE INHALER: CPT

## 2022-05-05 PROCEDURE — 1210000000 HC MED SURG R&B

## 2022-05-05 PROCEDURE — 36415 COLL VENOUS BLD VENIPUNCTURE: CPT

## 2022-05-05 PROCEDURE — 83605 ASSAY OF LACTIC ACID: CPT

## 2022-05-05 PROCEDURE — 1090F PRES/ABSN URINE INCON ASSESS: CPT | Performed by: FAMILY MEDICINE

## 2022-05-05 PROCEDURE — 74177 CT ABD & PELVIS W/CONTRAST: CPT

## 2022-05-05 PROCEDURE — 83735 ASSAY OF MAGNESIUM: CPT

## 2022-05-05 PROCEDURE — 87186 SC STD MICRODIL/AGAR DIL: CPT

## 2022-05-05 PROCEDURE — 80053 COMPREHEN METABOLIC PANEL: CPT

## 2022-05-05 PROCEDURE — 81001 URINALYSIS AUTO W/SCOPE: CPT

## 2022-05-05 PROCEDURE — 87088 URINE BACTERIA CULTURE: CPT

## 2022-05-05 PROCEDURE — 99285 EMERGENCY DEPT VISIT HI MDM: CPT

## 2022-05-05 PROCEDURE — 85025 COMPLETE CBC W/AUTO DIFF WBC: CPT

## 2022-05-05 PROCEDURE — 82550 ASSAY OF CK (CPK): CPT

## 2022-05-05 PROCEDURE — 86140 C-REACTIVE PROTEIN: CPT

## 2022-05-05 RX ORDER — AMLODIPINE BESYLATE 5 MG/1
TABLET ORAL
COMMUNITY

## 2022-05-05 RX ORDER — TROSPIUM CHLORIDE 20 MG/1
20 TABLET, FILM COATED ORAL
Status: DISCONTINUED | OUTPATIENT
Start: 2022-05-05 | End: 2022-05-07 | Stop reason: HOSPADM

## 2022-05-05 RX ORDER — ACETAMINOPHEN 325 MG/1
650 TABLET ORAL EVERY 6 HOURS PRN
Status: DISCONTINUED | OUTPATIENT
Start: 2022-05-05 | End: 2022-05-07 | Stop reason: HOSPADM

## 2022-05-05 RX ORDER — ONDANSETRON 4 MG/1
4 TABLET, ORALLY DISINTEGRATING ORAL EVERY 8 HOURS PRN
Status: DISCONTINUED | OUTPATIENT
Start: 2022-05-05 | End: 2022-05-07 | Stop reason: HOSPADM

## 2022-05-05 RX ORDER — BUDESONIDE AND FORMOTEROL FUMARATE DIHYDRATE 160; 4.5 UG/1; UG/1
2 AEROSOL RESPIRATORY (INHALATION) 2 TIMES DAILY
Status: DISCONTINUED | OUTPATIENT
Start: 2022-05-05 | End: 2022-05-07 | Stop reason: HOSPADM

## 2022-05-05 RX ORDER — INSULIN LISPRO 100 [IU]/ML
0-6 INJECTION, SOLUTION INTRAVENOUS; SUBCUTANEOUS
Status: DISCONTINUED | OUTPATIENT
Start: 2022-05-05 | End: 2022-05-07 | Stop reason: HOSPADM

## 2022-05-05 RX ORDER — ALBUTEROL SULFATE 2.5 MG/3ML
2.5 SOLUTION RESPIRATORY (INHALATION) EVERY 6 HOURS PRN
Status: DISCONTINUED | OUTPATIENT
Start: 2022-05-05 | End: 2022-05-05

## 2022-05-05 RX ORDER — DEXTROSE MONOHYDRATE 50 MG/ML
100 INJECTION, SOLUTION INTRAVENOUS PRN
Status: DISCONTINUED | OUTPATIENT
Start: 2022-05-05 | End: 2022-05-07 | Stop reason: HOSPADM

## 2022-05-05 RX ORDER — ATORVASTATIN CALCIUM 40 MG/1
40 TABLET, FILM COATED ORAL EVERY EVENING
Status: DISCONTINUED | OUTPATIENT
Start: 2022-05-05 | End: 2022-05-07 | Stop reason: HOSPADM

## 2022-05-05 RX ORDER — INSULIN LISPRO 100 [IU]/ML
0-3 INJECTION, SOLUTION INTRAVENOUS; SUBCUTANEOUS NIGHTLY
Status: DISCONTINUED | OUTPATIENT
Start: 2022-05-05 | End: 2022-05-07 | Stop reason: HOSPADM

## 2022-05-05 RX ORDER — ACETAMINOPHEN 650 MG/1
650 SUPPOSITORY RECTAL EVERY 6 HOURS PRN
Status: DISCONTINUED | OUTPATIENT
Start: 2022-05-05 | End: 2022-05-07 | Stop reason: HOSPADM

## 2022-05-05 RX ORDER — ONDANSETRON 2 MG/ML
4 INJECTION INTRAMUSCULAR; INTRAVENOUS ONCE
Status: COMPLETED | OUTPATIENT
Start: 2022-05-05 | End: 2022-05-05

## 2022-05-05 RX ORDER — SODIUM CHLORIDE 0.9 % (FLUSH) 0.9 %
5-40 SYRINGE (ML) INJECTION EVERY 12 HOURS SCHEDULED
Status: DISCONTINUED | OUTPATIENT
Start: 2022-05-05 | End: 2022-05-07 | Stop reason: HOSPADM

## 2022-05-05 RX ORDER — FLUTICASONE PROPIONATE 50 MCG
1 SPRAY, SUSPENSION (ML) NASAL DAILY
Status: DISCONTINUED | OUTPATIENT
Start: 2022-05-06 | End: 2022-05-07 | Stop reason: HOSPADM

## 2022-05-05 RX ORDER — ALBUTEROL SULFATE 2.5 MG/3ML
2.5 SOLUTION RESPIRATORY (INHALATION) EVERY 4 HOURS PRN
Status: DISCONTINUED | OUTPATIENT
Start: 2022-05-05 | End: 2022-05-07 | Stop reason: HOSPADM

## 2022-05-05 RX ORDER — SOTALOL HYDROCHLORIDE 120 MG/1
120 TABLET ORAL 2 TIMES DAILY
Status: DISCONTINUED | OUTPATIENT
Start: 2022-05-05 | End: 2022-05-07 | Stop reason: HOSPADM

## 2022-05-05 RX ORDER — 0.9 % SODIUM CHLORIDE 0.9 %
1000 INTRAVENOUS SOLUTION INTRAVENOUS ONCE
Status: COMPLETED | OUTPATIENT
Start: 2022-05-05 | End: 2022-05-05

## 2022-05-05 RX ORDER — ENOXAPARIN SODIUM 100 MG/ML
40 INJECTION SUBCUTANEOUS DAILY
Status: DISCONTINUED | OUTPATIENT
Start: 2022-05-05 | End: 2022-05-07 | Stop reason: HOSPADM

## 2022-05-05 RX ORDER — ASPIRIN 81 MG/1
81 TABLET, CHEWABLE ORAL EVERY OTHER DAY
Status: DISCONTINUED | OUTPATIENT
Start: 2022-05-06 | End: 2022-05-07 | Stop reason: HOSPADM

## 2022-05-05 RX ORDER — POLYETHYLENE GLYCOL 3350 17 G/17G
17 POWDER, FOR SOLUTION ORAL DAILY PRN
Status: DISCONTINUED | OUTPATIENT
Start: 2022-05-05 | End: 2022-05-07 | Stop reason: HOSPADM

## 2022-05-05 RX ORDER — DEXTROSE MONOHYDRATE 25 G/50ML
12.5 INJECTION, SOLUTION INTRAVENOUS PRN
Status: DISCONTINUED | OUTPATIENT
Start: 2022-05-05 | End: 2022-05-07 | Stop reason: RX

## 2022-05-05 RX ORDER — SODIUM CHLORIDE 9 MG/ML
INJECTION, SOLUTION INTRAVENOUS CONTINUOUS
Status: DISPENSED | OUTPATIENT
Start: 2022-05-05 | End: 2022-05-07

## 2022-05-05 RX ORDER — NICOTINE POLACRILEX 4 MG
15 LOZENGE BUCCAL PRN
Status: DISCONTINUED | OUTPATIENT
Start: 2022-05-05 | End: 2022-05-07 | Stop reason: CLARIF

## 2022-05-05 RX ORDER — SODIUM CHLORIDE 0.9 % (FLUSH) 0.9 %
5-40 SYRINGE (ML) INJECTION PRN
Status: DISCONTINUED | OUTPATIENT
Start: 2022-05-05 | End: 2022-05-07 | Stop reason: HOSPADM

## 2022-05-05 RX ORDER — IBUPROFEN 600 MG/1
TABLET ORAL
COMMUNITY

## 2022-05-05 RX ORDER — SODIUM CHLORIDE 9 MG/ML
INJECTION, SOLUTION INTRAVENOUS PRN
Status: DISCONTINUED | OUTPATIENT
Start: 2022-05-05 | End: 2022-05-07 | Stop reason: HOSPADM

## 2022-05-05 RX ORDER — ONDANSETRON 2 MG/ML
4 INJECTION INTRAMUSCULAR; INTRAVENOUS EVERY 6 HOURS PRN
Status: DISCONTINUED | OUTPATIENT
Start: 2022-05-05 | End: 2022-05-07 | Stop reason: HOSPADM

## 2022-05-05 RX ADMIN — IOPAMIDOL 100 ML: 612 INJECTION, SOLUTION INTRAVENOUS at 13:36

## 2022-05-05 RX ADMIN — ATORVASTATIN CALCIUM 40 MG: 40 TABLET, FILM COATED ORAL at 17:34

## 2022-05-05 RX ADMIN — ENOXAPARIN SODIUM 40 MG: 100 INJECTION SUBCUTANEOUS at 17:34

## 2022-05-05 RX ADMIN — BUDESONIDE AND FORMOTEROL FUMARATE DIHYDRATE 2 PUFF: 160; 4.5 AEROSOL RESPIRATORY (INHALATION) at 19:07

## 2022-05-05 RX ADMIN — SODIUM CHLORIDE: 9 INJECTION, SOLUTION INTRAVENOUS at 17:33

## 2022-05-05 RX ADMIN — SODIUM CHLORIDE, PRESERVATIVE FREE 10 ML: 5 INJECTION INTRAVENOUS at 20:52

## 2022-05-05 RX ADMIN — ONDANSETRON 4 MG: 2 INJECTION INTRAMUSCULAR; INTRAVENOUS at 14:33

## 2022-05-05 RX ADMIN — INSULIN LISPRO 1 UNITS: 100 INJECTION, SOLUTION INTRAVENOUS; SUBCUTANEOUS at 20:52

## 2022-05-05 RX ADMIN — SOTALOL HYDROCHLORIDE 120 MG: 120 TABLET ORAL at 20:52

## 2022-05-05 RX ADMIN — SODIUM CHLORIDE 1000 ML: 9 INJECTION, SOLUTION INTRAVENOUS at 13:24

## 2022-05-05 RX ADMIN — CEFTRIAXONE SODIUM 1000 MG: 1 INJECTION, POWDER, FOR SOLUTION INTRAMUSCULAR; INTRAVENOUS at 14:34

## 2022-05-05 ASSESSMENT — ENCOUNTER SYMPTOMS
SHORTNESS OF BREATH: 0
BACK PAIN: 0
CHEST TIGHTNESS: 0
SINUS PRESSURE: 0
VOMITING: 1
ABDOMINAL PAIN: 1
TROUBLE SWALLOWING: 0
CHEST TIGHTNESS: 0
ABDOMINAL DISTENTION: 0
DIARRHEA: 0
ABDOMINAL PAIN: 0
DIARRHEA: 0
NAUSEA: 1
COUGH: 0
NAUSEA: 0
SHORTNESS OF BREATH: 0
PHOTOPHOBIA: 0
VOMITING: 1

## 2022-05-05 ASSESSMENT — PAIN - FUNCTIONAL ASSESSMENT: PAIN_FUNCTIONAL_ASSESSMENT: 0-10

## 2022-05-05 ASSESSMENT — PAIN SCALES - GENERAL
PAINLEVEL_OUTOF10: 5
PAINLEVEL_OUTOF10: 0

## 2022-05-05 ASSESSMENT — PAIN DESCRIPTION - DESCRIPTORS: DESCRIPTORS: ACHING;CRAMPING

## 2022-05-05 ASSESSMENT — PAIN DESCRIPTION - LOCATION: LOCATION: ABDOMEN

## 2022-05-05 NOTE — ED NOTES
Spoke to patients son Junior Gross @ [phone], informed him of admission.       Christel Garcia RN  05/05/22 6704

## 2022-05-05 NOTE — H&P
[facility]  MEDICINE    HISTORY AND PHYSICAL EXAM    PATIENT NAME:  Kindra Ring    MRN:  [id number]  SERVICE DATE:  5/5/2022   SERVICE TIME:  2:37 PM    Primary Care Physician: Rosalind Charles MD     SUBJECTIVE  CHIEF COMPLAINT: Abdominal pain with nausea and vomiting x6 days    HPI:  Kindra Ring is a 74 y.o., , female who presents with complaint of abdominal pain with nausea and vomiting for 6 days. PMH significant for  has a past medical history of Arthritis, CAD (coronary artery disease), Cancer ([address] COPD (chronic obstructive pulmonary disease) ([facility] Diabetes mellitus ([facility] GERD (gastroesophageal reflux disease), History of blood transfusion, Hyperlipidemia, Hypertension, and PVD (peripheral vascular disease) ([facility] .      Patient is a 64-year-old female who presents to the ED after being sent in by PCP. She has been having nausea and vomiting for past 6 days with abdominal pain. PCP had concern for possible bowel obstruction. CT performed in ED demonstrated no evidence of bowel obstruction, but urinalysis was significant of for acute UTI. Patient been having intractable nausea/vomiting for approximately the past 6 days, and is unable to keep down any solids and almost all liquids. In the ED she is found to be clinically dry on exam, though laboratory analysis demonstrates only mild creatinine elevation not yet meeting threshold for SIMONE. In setting of intractable nausea/vomiting in setting of acute UTI, hospitalist service is consulted for admission. Patient is full code. On examination, patient complains of mild nausea no recent vomiting. She reports that her left chest port has been in place for the last ~3 years, and is to be used on a regular basis for outpatient iron infusions for chronic anemia. She is not currently undergoing any chemotherapy for her history of right breast cancer. She does not use home oxygen for her COPD, nor does she use home insulin for her DM2. Aside from symptoms reported above, patient has no other new/acute complaints.        PAST MEDICAL HISTORY:    Past Medical History:   Diagnosis Date    Arthritis     CAD (coronary artery disease)     cardiac stents x 3    Cancer ([facility]     right breast    COPD (chronic obstructive pulmonary disease) ([facility]     smoker since age 10    Diabetes mellitus ([facility]     hx > 30 yrs    GERD (gastroesophageal reflux disease)     History of blood transfusion 07/2019    blood transfusions x 3 due to lower GI bleed / [address] Hyperlipidemia     meds > 10 yrs    Hypertension     meds > 10 yrs    PVD (peripheral vascular disease) ([facility]     both legs     PAST SURGICAL HISTORY:    Past Surgical History:   Procedure Laterality Date    BREAST SURGERY Right 1996    mastectomy due to malignancy / chemo to follow    CARDIAC CATHETERIZATION      COLONOSCOPY  08/25/2016    Yuriy English MD    CORONARY ANGIOPLASTY WITH STENT PLACEMENT      cardiac stents x 3    ENDOSCOPY, COLON, DIAGNOSTIC      EYE SURGERY      [place] with IOL OS    HYSTERECTOMY  1968    MYRINGOTOMY AND TYMPANOSTOMY TUBE PLACEMENT Bilateral 8/8/2019    BILATERAL MYRINGOTOMY WITH VENTILATING  TUBE INSERTION performed by Jessy Fox MD at [address] [place] [address]      as child    TUNNELED VENOUS PORT PLACEMENT  2015    used for Iron infusions     FAMILY HISTORY:    Family History   Problem Relation Age of Onset    Breast Cancer Mother     Diabetes Mother     Stroke Mother     High Blood Pressure Father     Heart Disease Father     High Cholesterol Father     Diabetes Father     Stroke Father     Cancer Sister         oral cancer    Diabetes Brother     Kidney Disease Brother     Alcohol Abuse Brother         liver problems    Cancer Brother         liver & lung cancer    Other Son         PTSD / blood dyscrasia    Cancer Sister         brain & lung cancer    Other Son         MVA at age 29     SOCIAL HISTORY:    Social History Socioeconomic History    Marital status:      Spouse name: Not on file    Number of children: Not on file    Years of education: Not on file    Highest education level: Not on file   Occupational History    Not on file   Tobacco Use    Smoking status: Former Smoker     Packs/day: 0.25     Years: 50.00     Pack years: 12.50     Types: Cigarettes    Smokeless tobacco: Never Used   Vaping Use    Vaping Use: Never used   Substance and Sexual Activity    Alcohol use: No    Drug use: No    Sexual activity: Not on file   Other Topics Concern    Not on file   Social History Narrative    Not on file     Social Determinants of Health     Financial Resource Strain: Medium Risk    Difficulty of Paying Living Expenses: Somewhat hard   Food Insecurity: Food Insecurity Present    Worried About Running Out of Food in the Last Year: Often true    Damián of Food in the Last Year: Often true   Transportation Needs:     Lack of Transportation (Medical): Not on file    Lack of Transportation (Non-Medical):  Not on file   Physical Activity:     Days of Exercise per Week: Not on file    Minutes of Exercise per Session: Not on file   Stress:     Feeling of Stress : Not on file   Social Connections:     Frequency of Communication with Friends and Family: Not on file    Frequency of Social Gatherings with Friends and Family: Not on file    Attends [employer] Services: Not on file    Active Member of [address] [place] [address] or Organizations: Not on file    Attends Club or Organization Meetings: Not on file    Marital Status: Not on file   Intimate Partner Violence:     Fear of Current or Ex-Partner: Not on file    Emotionally Abused: Not on file    Physically Abused: Not on file    Sexually Abused: Not on file   Housing Stability:     Unable to Pay for Housing in the Last Year: Not on file    Number of [place] in the Last Year: Not on file    Unstable Housing in the Last Year: Not on file     MEDICATIONS: Prior to Admission medications    Medication Sig Start Date End Date Taking? Authorizing Provider   amLODIPine (NORVASC) 5 MG tablet Take by mouth    Historical Provider, MD   ibuprofen (ADVIL;MOTRIN) 600 MG tablet Take by mouth    Historical Provider, MD   metFORMIN (GLUCOPHAGE) 1000 MG tablet Take 1 tablet by mouth 2 times daily (with meals) 4/5/22   Roseann Flowers MD   estradiol (ESTRACE) 0.1 MG/GM vaginal cream Vaginally. Finger tip dose every other night 6/1/21   Historical Provider, MD   lisinopril (PRINIVIL;ZESTRIL) 10 MG tablet Take 10 mg by mouth daily    Historical Provider, MD   fluticasone-umeclidin-vilant (TRELEGY ELLIPTA) 100-62.5-25 MCG/INH AEPB Inhale 1 puff into the lungs daily 12/9/21   Roseann Flowers MD   aspirin 81 MG tablet Take 81 mg by mouth daily Indications: every other day     Historical Provider, MD   sotalol (BETAPACE) 80 MG tablet Take 120 mg by mouth 2 times daily    Historical Provider, MD   albuterol (PROVENTIL) (2.5 MG/3ML) 0.083% nebulizer solution Take 2.5 mg by nebulization every 6 hours as needed for Wheezing    Historical Provider, MD   Mirabegron ER 50 MG TB24 Take by mouth daily    Historical Provider, MD   atorvastatin (LIPITOR) 80 MG tablet Take 40 mg by mouth every evening  10/14/14   Historical Provider, MD   fluticasone (FLONASE) 50 MCG/ACT nasal spray 1 spray    Historical Provider, MD   nitroGLYCERIN (MINITRAN) 0.4 MG/HR Place under the tongue    Historical Provider, MD       ALLERGIES: Diltiazem    REVIEW OF SYSTEM:   A full 12 point review of systems was completed, and was unremarkable except as specified above. OBJECTIVE    BP (!) 130/57   Pulse 62   Temp 97.5 °F (36.4 °C) (Temporal)   Resp 16   Ht 5' 2\" (1.575 m)   Wt 149 lb (67.6 kg)   SpO2 96%   BMI 27.25 kg/m²     PHYSICAL EXAM:   Constitutional: Elderly female reclining in bed no acute distress  Head: NCAT  Eyes: PERRLA, EOMI  ENT: Hard of hearing, with hearing aids in place.   Mildly dry anterior oropharynx. Neck: Trachea midline, phonation normal  Cardiovascular: RRR, + 3/6 systolic murmur appreciated best at LLSB. Warm and well perfused peripherally. Decreased skin turgor with appreciable tenting. Left chest port noted. Hx right mastectomy noted. Pulmonary: Normal rate and effort of respiration on room air. CTA B. No coughing during exam.  Abdomen: Soft, nontense, nondistended. Mild suprapubic tenderness to palpation. Negative CVA tenderness. Hypoactive bowel sounds. Neurologic: Grossly alert and oriented. No focal neurologic deficits appreciated. Psychiatric: Pleasant, calm, cooperative. DATA:     Diagnostic tests reviewed for today's visit:    Most recent labs and imaging results reviewed.      LABS:    Recent Results (from the past 24 hour(s))   Lipase    Collection Time: 05/05/22 12:54 PM   Result Value Ref Range    Lipase 108 (H) 12 - 95 U/L   Lactic Acid    Collection Time: 05/05/22 12:54 PM   Result Value Ref Range    Lactic Acid 1.6 0.5 - 2.2 mmol/L   CBC with Auto Differential    Collection Time: 05/05/22 12:54 PM   Result Value Ref Range    WBC 10.9 (H) 4.8 - 10.8 K/uL    RBC 4.48 4.20 - 5.40 M/uL    Hemoglobin 12.7 12.0 - 16.0 g/dL    Hematocrit 38.7 37.0 - 47.0 %    MCV 86.5 82.0 - 100.0 fL    MCH 28.3 27.0 - 31.3 pg    MCHC 32.7 (L) 33.0 - 37.0 %    RDW 16.1 (H) 11.5 - 14.5 %    Platelets 134 491 - 017 K/uL    Neutrophils % 71.8 %    Lymphocytes % 17.1 %    Monocytes % 7.6 %    Eosinophils % 2.3 %    Basophils % 1.2 %    Neutrophils Absolute 7.8 (H) 1.4 - 6.5 K/uL    Lymphocytes Absolute 1.9 1.0 - 4.8 K/uL    Monocytes Absolute 0.8 0.2 - 0.8 K/uL    Eosinophils Absolute 0.2 0.0 - 0.7 K/uL    Basophils Absolute 0.1 0.0 - 0.2 K/uL   Comprehensive Metabolic Panel    Collection Time: 05/05/22 12:54 PM   Result Value Ref Range    Sodium 134 (L) 135 - 144 mEq/L    Potassium 4.3 3.4 - 4.9 mEq/L    Chloride 100 95 - 107 mEq/L    CO2 22 20 - 31 mEq/L    Anion Gap 12 9 - 15 mEq/L    Glucose 115 (H) 70 - 99 mg/dL    BUN 23 8 - 23 mg/dL    CREATININE 1.19 (H) 0.50 - 0.90 mg/dL    GFR Non-African American 44.3 (L) >60    GFR  53.7 (L) >60    Calcium 9.0 8.5 - 9.9 mg/dL    Total Protein 6.6 6.3 - 8.0 g/dL    Albumin 3.8 3.5 - 4.6 g/dL    Total Bilirubin 0.3 0.2 - 0.7 mg/dL    Alkaline Phosphatase 94 40 - 130 U/L    ALT 11 0 - 33 U/L    AST 14 0 - 35 U/L    Globulin 2.8 2.3 - 3.5 g/dL   C-Reactive Protein    Collection Time: 05/05/22 12:54 PM   Result Value Ref Range    CRP 3.9 0.0 - 5.0 mg/L   Magnesium    Collection Time: 05/05/22 12:54 PM   Result Value Ref Range    Magnesium 2.5 (H) 1.7 - 2.4 mg/dL   CK    Collection Time: 05/05/22 12:54 PM   Result Value Ref Range    Total CK 56 0 - 170 U/L   Urinalysis with Reflex to Culture    Collection Time: 05/05/22  1:00 PM    Specimen: Urine   Result Value Ref Range    Color, UA Yellow Straw/Yellow    Clarity, UA CLOUDY (A) Clear    Glucose, Ur Negative Negative mg/dL    Bilirubin Urine Negative Negative    Ketones, Urine Negative Negative mg/dL    Specific Gravity, UA 1.023 1.005 - 1.030    Blood, Urine TRACE (A) Negative    pH, UA 6.0 5.0 - 9.0    Protein, UA TRACE (A) Negative mg/dL    Urobilinogen, Urine 0.2 <2.0 E.U./dL    Nitrite, Urine POSITIVE (A) Negative    Leukocyte Esterase, Urine LARGE (A) Negative    Urine Reflex to Culture Yes    Urine Drug Screen    Collection Time: 05/05/22  1:00 PM   Result Value Ref Range    Amphetamine Screen, Urine Neg Negative <1000 ng/mL    Barbiturate Screen, Ur Neg Negative < 200 ng/mL    Benzodiazepine Screen, Urine Neg Negative < 200 ng/mL    Cannabinoid Scrn, Ur Neg Negative < 50 ng/mL    Cocaine Metabolite Screen, Urine Neg Negative < 300 ng/mL    Opiate Scrn, Ur Neg Negative < 300 ng/mL    PCP Screen, Urine Neg Negative < 25 ng/mL    Methadone Screen, Urine Neg Negative <300 ng/mL    Propoxyphene Scrn, Ur Neg Negative <300 ng/mL    Oxycodone Urine Neg Negative <100 ng/mL    Drug Screen Comment: see below    Microscopic Urinalysis    Collection Time: 05/05/22  1:00 PM   Result Value Ref Range    Bacteria, UA MANY (A) Negative /HPF    Hyaline Casts, UA 1-3 0 - 5 /HPF    WBC, UA >100 (H) 0 - 5 /HPF    RBC, UA 3-5 (A) 0 - 5 /HPF    Epithelial Cells, UA 0-2 0 - 5 /HPF       IMAGING:      CT ABDOMEN PELVIS W IV CONTRAST   Additional Contrast? None  Result Date: 5/5/2022  CT ABDOMEN PELVIS W IV CONTRAST: 5/5/2022 CLINICAL HISTORY:  6 days of vomiting and abdominal pain; SBO vs pancreatitis . COMPARISON: 6/28/2016. TECHNIQUE: Spiral images were obtained of the abdomen and pelvis after the uneventful intravenous administration of approximately 100 mL of Isovue-300 contrast. All CT scans at this facility use dose modulation, iterative reconstruction, and/or weight based dosing when appropriate to reduce radiation dose to as low as reasonably achievable. FINDINGS: There is no abnormal bowel or biliary dilatation, ascites, inflammatory changes, significant lymphadenopathy, or other significant changes from 6/20/2016 identified. Very small nonobstructing renal calculi, a small cyst in the lower pole left kidney, moderate atherosclerotic plaquing of a moderately ectatic abdominal aorta and normal caliber branch vessels, and postoperative changes from previous hysterectomy are again noted. The liver, gallbladder, spleen, pancreas, adrenal glands, unopacified bowel loops, urinary bladder, and additional images of the pelvis are unremarkable. Mild to moderate degenerative changes of the thoracolumbar spine are again noted. A very small fat-containing umbilical hernia is present. Mild probable atelectasis or scarring is noted of the visualized lung bases. An approximately 8 mm subpleural left lower lobe pulmonary nodule appears unchanged. NO ACUTE INTRA-ABDOMINAL PROCESS OR SIGNIFICANT CHANGE FROM 6/28/2016 IDENTIFIED. CHRONIC FINDINGS, AS NOTED.          ASSESSMENT AND PLAN    Principal Problem:  · UTI (urinary tract infection)  · Intractable nausea/vomiting in setting of above    Chronic problems:  · CAD s/p stent x3  · COPD (not on home O2)  · DM2 on PO agents at home  · GERD  · HTN  · HLD  · PVD  · Arthritis  · Hx right side breast cancer  · [place]  · Chronic anemia on OP iron infusions via port    Plan:  · Admit to inpatient status  · Continue empiric ceftriaxone initiated in ED  · Follow urine culture for C&S results  · Antiemetics for N/V  · Conservative IV fluids with NS at 75 mL/HR x48 hours. Monitor closely for volume overload. · Low sliding scale insulin AC 3 times daily and nightly.   · Adult cardiac diabetic GI bland diet as tolerated  · Continue/hold home medications as ordered after medication history completed      SIGNATURE: Sal Grimaldo DO  DATE: May 5, 2022  TIME: 2:37 PM

## 2022-05-05 NOTE — PROGRESS NOTES
[facility] EMERGENCY [facility] AT [place] Respiratory Therapy Evaluation   Current Order:  Albuterol Q6 PRN  Home Regimen: PRN  Ordering Physician: Klever Briscoe  Re-evaluation Date: eval done     Diagnosis: UTI  Patient Status: Stable / Unstable + Physician notified    The following MDI Criteria must be met in order to convert aerosol to MDI with spacer. If unable to meet, MDI will be converted to aerosol:  []  Patient able to demonstrate the ability to use MDI effectively  []  Patient alert and cooperative  []  Patient able to take deep breath with 5-10 second hold  []  Medication(s) available in this delivery method   []  Peak flow greater than or equal to 200 ml/min            Current Order Substituted To  (same drug, same frequency)   Aerosol to MDI [] Albuterol Sulfate 0.083% unit dose by aerosol Albuterol Sulfate MDI 2 puffs by inhalation with spacer    [] Levalbuterol 1.25 mg unit dose by aerosol Levalbuterol MDI 2 puffs by inhalation with spacer    [] Levalbuterol 0.63 mg unit dose by aerosol Levalbuterol MDI 2 puffs by inhalation with spacer    [] Ipratropium Bromide 0.02% unit dose by aerosol Ipratropium Bromide MDI 2 puffs by inhalation with spacer    [] Duoneb (Ipratropium + Albuterol) unit dose by aerosol Ipratropium MDI + Albuterol MDI 2 puffs by inhalation w/spacer   MDI to Aerosol [] Albuterol Sulfate MDI Albuterol Sulfate 0.083% unit dose by aerosol    [] Levalbuterol MDI 2 puffs by inhalation Levalbuterol 1.25 mg unit dose by aerosol    [] Ipratropium Bromide MDI by inhalation Ipratropium Bromide 0.02% unit dose by aerosol    [] Combivent (Ipratropium + Albuterol) MDI by inhalation Duoneb (Ipratropium + Albuterol) unit dose by aerosol       Treatment Assessment [Frequency/Schedule]:  Change frequency to: _________albuterol_Q4 PRN________________________________________per Protocol, P&T, MEC      Points 0 1 2 3 4   Pulmonary Status  Non-Smoker  []   Smoking history   < 20 pack years  []   Smoking history  ?  20 pack years  [] Pulmonary Disorder  (acute or chronic)  [x]   Severe or Chronic w/ Exacerbation  []     Surgical Status No [x]   Surgeries     General []   Surgery Lower []   Abdominal Thoracic or []   Upper Abdominal Thoracic with  PulmonaryDisorder  []     Chest X-ray Clear/Not  Ordered     [x]  Chronic Changes  Results Pending  []  Infiltrates, atelectasis, pleural effusion, or edema  []  Infiltrates in more than one lobe []  Infiltrate + Atelectasis, &/or pleural effusion  []    Respiratory Pattern Regular,  RR = 12-20 [x]  Increased,  RR = 21-25 []  BURTON, irregular,  or RR = 26-30 []  Decreased FEV1  or RR = 31-35 []  Severe SOB, use  of accessory muscles, or RR ? 35  []    Mental Status Alert, oriented,  Cooperative [x]  Confused but Follows commands []  Lethargic or unable to follow commands []  Obtunded  []  Comatose  []    Breath Sounds Clear to  auscultation  []  Decreased unilaterally or  in bases only []  Decreased  bilaterally  [x]  Crackles or intermittent wheezes []  Wheezes []    Cough Strong, Spontan., & nonproductive [x]  Strong,  spontaneous, &  productive []  Weak,  Nonproductive []  Weak, productive or  with wheezes []  No spontaneous  cough or may require suctioning []    Level of Activity Ambulatory [x]  Ambulatory w/ Assist  []  Non-ambulatory []  Paraplegic []  Quadriplegic []    Total    Score:__5_____     Triage Score:___5_____      Tri       Triage:     1. (>20) Freq: Q3    2. (16-20) Freq: Q4   3. (11-15) Freq: QID & Albuterol Q2 PRN    4. (6-10) Freq: TID & Albuterol Q2 PRN    5. (0-5) Freq Q4prn

## 2022-05-05 NOTE — CARE COORDINATION
[facility] Case Management Initial Discharge Assessment    Met with Patient to discuss discharge plan. PCP: Vlad Vanessa MD                                Date of Last Visit: today    [facility] Patient: No        [facility] Notified: no    If no PCP, list provided? N/A    Discharge Planning    Living Arrangements: independently at home    Who do you live with?     Who helps you with your care:  self or spouse    If lives at home:     Do you have any barriers navigating in your home? no    Patient can perform ADL? Yes    Current Services (outpatient and in home) :  None    Dialysis: No    Is transportation available to get to your appointments? Yes    DME Equipment:  no    Respiratory equipment: None    Respiratory provider:  no     Pharmacy:  [facility] - [facility]    Consult with Medication Assistance Program?  No      Patient agreeable to [place] [id number]? Declined    Patient agreeable to SNF/Rehab? Declined    Other discharge needs identified? N/A    Does Patient Have a High-Risk for Readmission Diagnosis (CHF, PN, MI, COPD)? No    Initial Discharge Plan? (Note: please see concurrent daily documentation for any updates after initial note). Pt denied any d/c needs in ER. Cm to assess for any further d/c needs and referrals.     Readmission Risk              Risk of Unplanned Readmission:  0         Electronically signed by Faustino Blackmon RN on 5/5/2022 at 3:48 PM

## 2022-05-05 NOTE — Clinical Note
Discharge Plan[de-identified] Other/[facility])   Telemetry/Cardiac Monitoring Required?: No

## 2022-05-05 NOTE — PROGRESS NOTES
Diagnosis Orders   1. Non-intractable vomiting without nausea, unspecified vomiting type     2. Hypotension due to hypovolemia       Return for [facility]. Patient Instructions   At this point patient is not able to tolerate p.o. supplement of any sort other than water which will lead to hyponatremia and other issues. Sent to the emergency room for IV fluid and evaluation for probable small bowel obstruction. Subjective:      Patient ID: Grover Garner is a 74 y.o. female who presents for:  Chief Complaint   Patient presents with    GI Problem     pt states when she eating it feels like food gets stuck trying to go down, has had a BM in 6 days        Patient is here for difficulty with eating. She states she feels food stick just below her rib cage in her abdomen. She has had to vomit it back up at times. Denies diarrhea. Has not had a bowel movement in 6 days. Some abdominal bloating. No actual nausea. No fever or chills. Has a history of having bowel surgery. She has been able to drink water and that is all. Denies any liquid dietary supplements. Avoiding solid food. Current Outpatient Medications on File Prior to Visit   Medication Sig Dispense Refill    amLODIPine (NORVASC) 5 MG tablet Take by mouth      ibuprofen (ADVIL;MOTRIN) 600 MG tablet Take by mouth      metFORMIN (GLUCOPHAGE) 1000 MG tablet Take 1 tablet by mouth 2 times daily (with meals) 60 tablet 2    estradiol (ESTRACE) 0.1 MG/GM vaginal cream Vaginally.   Finger tip dose every other night      lisinopril (PRINIVIL;ZESTRIL) 10 MG tablet Take 10 mg by mouth daily      fluticasone-umeclidin-vilant (TRELEGY ELLIPTA) 100-62.5-25 MCG/INH AEPB Inhale 1 puff into the lungs daily 1 each 0    aspirin 81 MG tablet Take 81 mg by mouth daily Indications: every other day       sotalol (BETAPACE) 80 MG tablet Take 120 mg by mouth 2 times daily      albuterol (PROVENTIL) (2.5 MG/3ML) 0.083% nebulizer solution Take 2.5 mg by nebulization every 6 hours as needed for Wheezing      Mirabegron ER 50 MG TB24 Take by mouth daily      atorvastatin (LIPITOR) 80 MG tablet Take 40 mg by mouth every evening       fluticasone (FLONASE) 50 MCG/ACT nasal spray 1 spray      nitroGLYCERIN (MINITRAN) 0.4 MG/HR Place under the tongue       No current facility-administered medications on file prior to visit.      Past Medical History:   Diagnosis Date    Arthritis     CAD (coronary artery disease)     cardiac stents x 3    Cancer ([facility]     right breast    COPD (chronic obstructive pulmonary disease) (HCC)     smoker since age 10    Diabetes mellitus ([facility]     hx > 30 yrs    GERD (gastroesophageal reflux disease)     History of blood transfusion 07/2019    blood transfusions x 3 due to lower GI bleed / [address] [place] Hyperlipidemia     meds > 10 yrs    Hypertension     meds > 10 yrs    PVD (peripheral vascular disease) ([facility]     both legs     Past Surgical History:   Procedure Laterality Date    BREAST SURGERY Right 1996    mastectomy due to malignancy / chemo to follow    CARDIAC CATHETERIZATION      COLONOSCOPY  08/25/2016    Asher Bravo MD    CORONARY ANGIOPLASTY WITH STENT PLACEMENT      cardiac stents x 3    ENDOSCOPY, COLON, DIAGNOSTIC      EYE SURGERY      [place] with IOL OS    HYSTERECTOMY  1968    MYRINGOTOMY AND TYMPANOSTOMY TUBE PLACEMENT Bilateral 8/8/2019    BILATERAL MYRINGOTOMY WITH VENTILATING  TUBE INSERTION performed by Deni Syde MD at [address]      as child    TUNNELED VENOUS PORT PLACEMENT  2015    used for Iron infusions     Social History     Socioeconomic History    Marital status:      Spouse name: Not on file    Number of children: Not on file    Years of education: Not on file    Highest education level: Not on file   Occupational History    Not on file   Tobacco Use    Smoking status: Former Smoker     Packs/day: 0.50     Years: 50.00     Pack years: 25.00     Types: Cigarettes Quit date: 4/1/2019     Years since quitting: 3.0    Smokeless tobacco: Never Used   Vaping Use    Vaping Use: Never used   Substance and Sexual Activity    Alcohol use: No    Drug use: No    Sexual activity: Not on file   Other Topics Concern    Not on file   Social History Narrative    Not on file     Social Determinants of Health     Financial Resource Strain: Medium Risk    Difficulty of Paying Living Expenses: Somewhat hard   Food Insecurity: Food Insecurity Present    Worried About Running Out of Food in the Last Year: Often true    Damián of Food in the Last Year: Often true   Transportation Needs:     Lack of Transportation (Medical): Not on file    Lack of Transportation (Non-Medical):  Not on file   Physical Activity:     Days of Exercise per Week: Not on file    Minutes of Exercise per Session: Not on file   Stress:     Feeling of Stress : Not on file   Social Connections:     Frequency of Communication with Friends and Family: Not on file    Frequency of Social Gatherings with Friends and Family: Not on file    Attends Zoroastrianism Services: Not on file    Active Member of [address] [place] [address] or Organizations: Not on file    Attends Club or Organization Meetings: Not on file    Marital Status: Not on file   Intimate Partner Violence:     Fear of Current or Ex-Partner: Not on file    Emotionally Abused: Not on file    Physically Abused: Not on file    Sexually Abused: Not on file   Housing Stability:     Unable to Pay for Housing in the Last Year: Not on file    Number of [place] in the Last Year: Not on file    Unstable Housing in the Last Year: Not on file     Family History   Problem Relation Age of Onset    Breast Cancer Mother     Diabetes Mother     Stroke Mother     High Blood Pressure Father     Heart Disease Father     High Cholesterol Father     Diabetes Father     Stroke Father     Cancer Sister         oral cancer    Diabetes Brother     Kidney Disease Brother     Alcohol Abuse Brother         liver problems    Cancer Brother         liver & lung cancer    Other Son         PTSD / blood dyscrasia    Cancer Sister         brain & lung cancer    Other Son         MVA at age 29     Allergies:  Diltiazem    Review of Systems   Constitutional: Negative for activity change, appetite change, diaphoresis and unexpected weight change. Eyes: Negative for photophobia and visual disturbance. Respiratory: Negative for chest tightness and shortness of breath. No orthopnea   Cardiovascular: Negative for chest pain, palpitations and leg swelling. Gastrointestinal: Positive for vomiting. Negative for abdominal distention, abdominal pain, diarrhea and nausea. Genitourinary: Negative for flank pain and frequency. Musculoskeletal: Negative for gait problem and joint swelling. Neurological: Negative for dizziness, weakness, light-headedness and headaches. Psychiatric/Behavioral: Negative for confusion. Objective:   BP 94/60   Pulse 70   Temp 97.2 °F (36.2 °C)   Ht 5' 2\" (1.575 m)   Wt 148 lb (67.1 kg)   SpO2 97%   BMI 27.07 kg/m²     Physical Exam  Constitutional:       General: She is not in acute distress. Appearance: She is well-developed. She is not diaphoretic. HENT:      Head: Normocephalic and atraumatic. Right Ear: External ear normal.      Left Ear: External ear normal.      Nose: Nose normal.   Eyes:      General:         Right eye: No discharge. Left eye: No discharge. Conjunctiva/sclera: Conjunctivae normal.      Pupils: Pupils are equal, round, and reactive to light. Neck:      Thyroid: No thyromegaly. Trachea: No tracheal deviation. Cardiovascular:      Rate and Rhythm: Normal rate and regular rhythm. Pulmonary:      Effort: Pulmonary effort is normal. No respiratory distress. Abdominal:      General: There is distension. Musculoskeletal:      Cervical back: Neck supple.    Skin:     General: Skin is warm and dry. Findings: No bruising or rash. Neurological:      Mental Status: She is alert. Coordination: Coordination normal.   Psychiatric:         Thought Content: Thought content normal.         Judgment: Judgment normal.         No results found for this visit on 05/05/22. No results found for this or any previous visit (from the past 2016 hour(s)). [] Pt was seen by provider for      Minutes  Counseling and coordination of care was done for all assessment diagnosis listed for today with patient and any family/friend present. More than 50% of this visit was spent coordinating current care, obtaining information for prior records, and counseling for current plan of action. Trial in the office of Ensure vanilla sips at a time. Patient immediately started vomiting it back up    Assessment:       Diagnosis Orders   1. Non-intractable vomiting without nausea, unspecified vomiting type     2. Hypotension due to hypovolemia           No orders of the defined types were placed in this encounter. No orders of the defined types were placed in this encounter. Medication List          Accurate as of May 5, 2022 12:23 PM. If you have any questions, ask your nurse or doctor.             CONTINUE taking these medications    albuterol (2.5 MG/3ML) 0.083% nebulizer solution  Commonly known as: PROVENTIL     amLODIPine 5 MG tablet  Commonly known as: NORVASC     aspirin 81 MG tablet     estradiol 0.1 MG/GM vaginal cream  Commonly known as: ESTRACE     fluticasone 50 MCG/ACT nasal spray  Commonly known as: FLONASE     ibuprofen 600 MG tablet  Commonly known as: ADVIL;MOTRIN     Lipitor 80 MG tablet  Generic drug: atorvastatin     lisinopril 10 MG tablet  Commonly known as: PRINIVIL;ZESTRIL     metFORMIN 1000 MG tablet  Commonly known as: GLUCOPHAGE  Take 1 tablet by mouth 2 times daily (with meals)     Minitran 0.4 MG/HR  Generic drug: nitroGLYCERIN     mirabegron 50 MG Tb24  Commonly known as: MYRBETRIQ     sotalol 80 MG tablet  Commonly known as: BETAPACE     Trelegy Ellipta 100-62.5-25 MCG/INH Aepb  Generic drug: fluticasone-umeclidin-vilant  Inhale 1 puff into the lungs daily              Plan:   Return for [facility]. Patient Instructions   At this point patient is not able to tolerate p.o. supplement of any sort other than water which will lead to hyponatremia and other issues. Sent to the emergency room for IV fluid and evaluation for probable small bowel obstruction. This note was partially created with the assistance of dictation. This may lead to grammatical or spelling errors. Noé Ramirez M.D.

## 2022-05-05 NOTE — ED PROVIDER NOTES
[facility] ED  eMERGENCY dEPARTMENT eNCOUnter      Pt Name: Iban Jarvis  MRN: [id number]  [place] 1950  Date of evaluation: 5/5/2022  Provider: Jeromy Parra, [address] [place] [address]       Chief Complaint   Patient presents with    Abdominal Pain     abd pain for the last 6 days with intermittent vomiting. pt states that her last BM was last thursday. pt PCP wanted her to come in for possible bowel obstruction          HISTORY OF PRESENT ILLNESS   (Location/Symptom, Timing/Onset,Context/Setting, Quality, Duration, Modifying Factors, Severity)  Note limiting factors. Iban Jarvis is a 74 y.o. female who presents to the emergency department with complaint of 6 days of vomiting. Patient states if she tries to eat anything solid she will simply vomit. The patient's son states that the patient sometimes vomits drinking liquids but if she drinks just plain clear water she seems to be able to hold that fluid down. The patient is vomiting 5-6 times a day. This is new times the last 6 days. Patient spoke with her family doctor and was advised to come to the emergency room for possible small bowel obstruction. Patient has tenderness to the upper and left upper and left lower abdomen. Patient denies any fever or chills. Patient denies blood in her stool. And the patient denies any diarrhea. Patient's son states he has been trying to make his mother drink liquids. The patient son is present helps contribute to history. At the patient's permission to interview, examine her, and discuss her care with her son present. The history is provided by the patient and a relative. NursingNotes were reviewed. REVIEW OF SYSTEMS    (2-9 systems for level 4, 10 or more for level 5)     Review of Systems   Constitutional: Positive for activity change, appetite change and fatigue. Negative for chills, diaphoresis, fever and unexpected weight change.    HENT: Negative for drooling, ear pain, nosebleeds, sinus pressure and trouble swallowing. Respiratory: Negative for cough, chest tightness and shortness of breath. Cardiovascular: Negative for chest pain and leg swelling. Gastrointestinal: Positive for abdominal pain, nausea and vomiting. Negative for diarrhea. Endocrine: Negative for polydipsia and polyphagia. Genitourinary: Negative for dysuria, flank pain and frequency. Musculoskeletal: Negative for back pain and myalgias. Skin: Negative for pallor and rash. Neurological: Negative for syncope, weakness and headaches. Hematological: Does not bruise/bleed easily. All other systems reviewed and are negative. Except as noted above the remainder of the review of systems was reviewed and negative.        PAST MEDICAL HISTORY     Past Medical History:   Diagnosis Date    Arthritis     CAD (coronary artery disease)     cardiac stents x 3    Cancer [facility]     right breast    COPD (chronic obstructive pulmonary disease) ([facility]     smoker since age 10    Diabetes mellitus ([facility]     hx > 30 yrs    GERD (gastroesophageal reflux disease)     History of blood transfusion 07/2019    blood transfusions x 3 due to lower GI bleed / [facility].    Hyperlipidemia     meds > 10 yrs    Hypertension     meds > 10 yrs    PVD (peripheral vascular disease) ([facility]     both legs         SURGICALHISTORY       Past Surgical History:   Procedure Laterality Date    BREAST SURGERY Right 1996    mastectomy due to malignancy / chemo to follow    CARDIAC CATHETERIZATION      COLONOSCOPY  08/25/2016    Asher Bravo MD    CORONARY ANGIOPLASTY WITH STENT PLACEMENT      cardiac stents x 3    ENDOSCOPY, COLON, DIAGNOSTIC      EYE SURGERY      Phaco with IOL OS    HYSTERECTOMY  1968    MYRINGOTOMY AND TYMPANOSTOMY TUBE PLACEMENT Bilateral 8/8/2019    BILATERAL MYRINGOTOMY WITH VENTILATING  TUBE INSERTION performed by Deni Syed MD at [address] [place] [address]      as child    TUNNELED VENOUS PORT PLACEMENT  2015    used for Iron infusions         CURRENT MEDICATIONS       Previous Medications    ALBUTEROL (PROVENTIL) (2.5 MG/3ML) 0.083% NEBULIZER SOLUTION    Take 2.5 mg by nebulization every 6 hours as needed for Wheezing    AMLODIPINE (NORVASC) 5 MG TABLET    Take by mouth    ASPIRIN 81 MG TABLET    Take 81 mg by mouth daily Indications: every other day     ATORVASTATIN (LIPITOR) 80 MG TABLET    Take 40 mg by mouth every evening     ESTRADIOL (ESTRACE) 0.1 MG/GM VAGINAL CREAM    Vaginally.   Finger tip dose every other night    FLUTICASONE (FLONASE) 50 MCG/ACT NASAL SPRAY    1 spray    FLUTICASONE-UMECLIDIN-VILANT (TRELEGY ELLIPTA) 100-62.5-25 MCG/INH AEPB    Inhale 1 puff into the lungs daily    IBUPROFEN (ADVIL;MOTRIN) 600 MG TABLET    Take by mouth    LISINOPRIL (PRINIVIL;ZESTRIL) 10 MG TABLET    Take 10 mg by mouth daily    METFORMIN (GLUCOPHAGE) 1000 MG TABLET    Take 1 tablet by mouth 2 times daily (with meals)    MIRABEGRON ER 50 MG TB24    Take by mouth daily    NITROGLYCERIN (MINITRAN) 0.4 MG/HR    Place under the tongue    SOTALOL (BETAPACE) 80 MG TABLET    Take 120 mg by mouth 2 times daily       ALLERGIES     Diltiazem    FAMILY HISTORY       Family History   Problem Relation Age of Onset    Breast Cancer Mother     Diabetes Mother     Stroke Mother     High Blood Pressure Father     Heart Disease Father     High Cholesterol Father     Diabetes Father     Stroke Father     Cancer Sister         oral cancer    Diabetes Brother     Kidney Disease Brother     Alcohol Abuse Brother         liver problems    Cancer Brother         liver & lung cancer    Other Son         PTSD / blood dyscrasia    Cancer Sister         brain & lung cancer    Other Son         MVA at age 29          SOCIAL HISTORY       Social History     Socioeconomic History    Marital status:      Spouse name: None    Number of children: None    Years of education: None    Highest education level: None Occupational History    None   Tobacco Use    Smoking status: Former Smoker     Packs/day: 0.25     Years: 50.00     Pack years: 12.50     Types: Cigarettes    Smokeless tobacco: Never Used   Vaping Use    Vaping Use: Never used   Substance and Sexual Activity    Alcohol use: No    Drug use: No    Sexual activity: None   Other Topics Concern    None   Social History Narrative    None     Social Determinants of Health     Financial Resource Strain: Medium Risk    Difficulty of Paying Living Expenses: Somewhat hard   Food Insecurity: Food Insecurity Present    Worried About Running Out of Food in the Last Year: Often true    Damián of Food in the Last Year: Often true   Transportation Needs:     Lack of Transportation (Medical): Not on file    Lack of Transportation (Non-Medical):  Not on file   Physical Activity:     Days of Exercise per Week: Not on file    Minutes of Exercise per Session: Not on file   Stress:     Feeling of Stress : Not on file   Social Connections:     Frequency of Communication with Friends and Family: Not on file    Frequency of Social Gatherings with Friends and Family: Not on file    Attends Hindu Services: Not on file    Active Member of [address] [place] [address] or Organizations: Not on file    Attends Club or Organization Meetings: Not on file    Marital Status: Not on file   Intimate Partner Violence:     Fear of Current or Ex-Partner: Not on file    Emotionally Abused: Not on file    Physically Abused: Not on file    Sexually Abused: Not on file   Housing Stability:     Unable to Pay for Housing in the Last Year: Not on file    Number of [place] in the Last Year: Not on file    Unstable Housing in the Last Year: Not on file       SCREENINGS    Clyman Coma Scale  Eye Opening: Spontaneous  Best Verbal Response: Oriented  Best Motor Response: Obeys commands  Jeana Coma Scale Score: 15 @FLOW([id number])@      PHYSICAL EXAM    (up to 7 for level 4, 8 or more for level 5) ED Triage Vitals [05/05/22 1224]   BP Temp Temp Source Pulse Resp SpO2 Height Weight   (!) 129/56 97.5 °F (36.4 °C) Temporal 68 17 96 % 5' 2\" (1.575 m) 149 lb (67.6 kg)       Physical Exam  Vitals and nursing note reviewed. Constitutional:       General: She is awake. She is in acute distress. Appearance: Normal appearance. She is well-developed and normal weight. She is not ill-appearing, toxic-appearing or diaphoretic. Comments: No photophobia. No phonophobia. HENT:      Head: Normocephalic and atraumatic. No Hendrickson's sign. Right Ear: External ear normal.      Left Ear: External ear normal.      Nose: Nose normal. No congestion or rhinorrhea. Mouth/Throat:      Lips: Pink. Mouth: Mucous membranes are dry. Pharynx: Oropharynx is clear. No oropharyngeal exudate or posterior oropharyngeal erythema. Eyes:      General: No scleral icterus. Right eye: No foreign body or discharge. Left eye: No discharge. Extraocular Movements: Extraocular movements intact. Conjunctiva/sclera: Conjunctivae normal.      Left eye: No exudate. Pupils: Pupils are equal, round, and reactive to light. Neck:      Vascular: No JVD. Trachea: No tracheal deviation. Comments: No meningismus. Cardiovascular:      Rate and Rhythm: Normal rate and regular rhythm. Heart sounds: Normal heart sounds. Heart sounds not distant. No murmur heard. No friction rub. No gallop. Pulmonary:      Effort: Pulmonary effort is normal. No respiratory distress. Breath sounds: Normal breath sounds. No stridor. No wheezing, rhonchi or rales. Chest:      Chest wall: No tenderness. Abdominal:      General: Abdomen is flat. Bowel sounds are normal. There is no distension or abdominal bruit. There are no signs of injury. Palpations: Abdomen is soft. There is no shifting dullness, fluid wave, hepatomegaly, splenomegaly, mass or pulsatile mass. Tenderness:  There is abdominal tenderness in the epigastric area, left upper quadrant and left lower quadrant. There is no right CVA tenderness, left CVA tenderness, guarding or rebound. Negative signs include Lazaro's sign and McBurney's sign. Hernia: No hernia is present. Musculoskeletal:         General: No swelling, tenderness, deformity or signs of injury. Normal range of motion. Cervical back: Normal range of motion and neck supple. No rigidity. Lymphadenopathy:      Head:      Right side of head: No submental adenopathy. Left side of head: No submental adenopathy. Skin:     General: Skin is warm and dry. Capillary Refill: Capillary refill takes less than 2 seconds. Coloration: Skin is not jaundiced or pale. Findings: No bruising, erythema, lesion or rash. Comments: Positive skin tenting. Neurological:      General: No focal deficit present. Mental Status: She is alert and oriented to person, place, and time. Mental status is at baseline. Cranial Nerves: No cranial nerve deficit. Sensory: No sensory deficit. Motor: No weakness. Coordination: Coordination normal.      Deep Tendon Reflexes: Reflexes are normal and symmetric. Psychiatric:         Mood and Affect: Mood normal.         Behavior: Behavior normal. Behavior is cooperative. Thought Content:  Thought content normal.         Judgment: Judgment normal.         DIAGNOSTIC RESULTS     EKG: All EKG's are interpreted by the Emergency Department Physician who either signs or Co-signsthis chart in the absence of a cardiologist.        RADIOLOGY:   [place] Conception such as CT, Ultrasound and MRI are read by the radiologist. Plain radiographic images are visualized and preliminarily interpreted by the emergency physician with the below findings:        Interpretation per the Radiologist below, if available at the time ofthis note:    CT ABDOMEN PELVIS W IV CONTRAST Additional Contrast? None   Final Result      NO ACUTE INTRA-ABDOMINAL PROCESS OR SIGNIFICANT CHANGE FROM 6/28/2016 IDENTIFIED. CHRONIC FINDINGS, AS NOTED. ED BEDSIDE ULTRASOUND:   Performed by ED Physician - none    LABS:  Labs Reviewed   LIPASE - Abnormal; Notable for the following components:       Result Value    Lipase 108 (*)     All other components within normal limits   URINALYSIS WITH REFLEX TO CULTURE - Abnormal; Notable for the following components:    Clarity, UA CLOUDY (*)     Blood, Urine TRACE (*)     Protein, UA TRACE (*)     Nitrite, Urine POSITIVE (*)     Leukocyte Esterase, Urine LARGE (*)     All other components within normal limits   CBC WITH AUTO DIFFERENTIAL - Abnormal; Notable for the following components:    WBC 10.9 (*)     MCHC 32.7 (*)     RDW 16.1 (*)     Neutrophils Absolute 7.8 (*)     All other components within normal limits   COMPREHENSIVE METABOLIC PANEL - Abnormal; Notable for the following components:    Sodium 134 (*)     Glucose 115 (*)     CREATININE 1.19 (*)     GFR Non- 44.3 (*)     GFR  53.7 (*)     All other components within normal limits   MAGNESIUM - Abnormal; Notable for the following components:    Magnesium 2.5 (*)     All other components within normal limits   MICROSCOPIC URINALYSIS - Abnormal; Notable for the following components:    Bacteria, UA MANY (*)     WBC, UA >100 (*)     RBC, UA 3-5 (*)     All other components within normal limits   CULTURE, URINE   LACTIC ACID   URINE DRUG SCREEN   C-REACTIVE PROTEIN   CK   POCT EPOC BLOOD GAS, LACTIC ACID, ICA       All other labs were within normal range or not returned as of this dictation.     EMERGENCY DEPARTMENT COURSE and DIFFERENTIAL DIAGNOSIS/MDM:   Vitals:    Vitals:    05/05/22 1224 05/05/22 1259 05/05/22 1430   BP: (!) 129/56  (!) 130/57   Pulse: 68  62   Resp: 17  16   Temp: 97.5 °F (36.4 °C)     TempSrc: Temporal     SpO2: 96% 97% 96%   Weight: 149 lb (67.6 kg)     Height: 5' 2\" (1.575 m)             MDM  She has not been able to tolerate solid food or liquids other than water x6 days. She is vomiting several times a day especially anytime she tries to eat or drink anything. The patient's son does get the patient to drink \"plain water. \"  On exam the patient has dry mucous membranes and skin tenting. Considerations are for bowel obstruction, diverticulitis, or perforation. The patient states she had a perforated colon before in the past but states this does not feel like that but she does feel that she is ill. The patient denied fever or sweating. She denies chills. Urinalysis indicates urinary tract infection with large leukocyte esterase and greater than 100 white blood cells in her urine. The CAT scan showed no acute findings. The patient given IV fluids, IV Zofran was ordered and the patient was ordered IV Rocephin. I have spoken with the patient for greater than 3 minutes about smoking cessation. I have advised the cold turkey method for quitting. I discussed the risks of smoking such as infection, blood clots, poor healing, cancer, heart attack, strokes, and neuropathy. Conference case with the hospitalist, Dr. Neel Donald and he excepted the patient. CONSULTS:  IP CONSULT TO HOSPITALIST    PROCEDURES:  Unless otherwise noted below, none     Procedures    FINAL IMPRESSION      1. Acute cystitis with hematuria    2. Food intolerance in adult    3. Dehydration    4. Abdominal pain, unspecified abdominal location    5. Non-intractable vomiting with nausea, unspecified vomiting type    6. Tobacco dependence          DISPOSITION/PLAN   DISPOSITION        PATIENT REFERRED TO:  No follow-up provider specified.     DISCHARGE MEDICATIONS:  New Prescriptions    No medications on file          (Please note that portions of this note were completed with a voice recognition program.  Efforts were made to edit the dictations but occasionally words are mis-transcribed.)    Benita DIAZ DO Emily (electronically signed)  Attending Emergency Physician          [address] ,   05/05/22 8660

## 2022-05-05 NOTE — PATIENT INSTRUCTIONS
At this point patient is not able to tolerate p.o. supplement of any sort other than water which will lead to hyponatremia and other issues. Sent to the emergency room for IV fluid and evaluation for probable small bowel obstruction.

## 2022-05-05 NOTE — PROGRESS NOTES
Physical Therapy Med Surg Initial Assessment  Facility/Department: [facility]  Room: [place] [address]       NAME: Shakira Christie  :  (74 y.o.)  MRN: [id number]  CODE STATUS: Full Code    Date of Service: 2022    Patient Diagnosis(es): Dehydration [E86.0]  Tobacco dependence [F17.200]  UTI (urinary tract infection) [N39.0]  Food intolerance in adult [K90.49]  Acute cystitis with hematuria [N30.01]  Abdominal pain, unspecified abdominal location [R10.9]  Non-intractable vomiting with nausea, unspecified vomiting type [R11.2]   Chief Complaint   Patient presents with    Abdominal Pain     abd pain for the last 6 days with intermittent vomiting. pt states that her last BM was last thursday.  pt PCP wanted her to come in for possible bowel obstruction      Patient Active Problem List    Diagnosis Date Noted    UTI (urinary tract infection) 2022    Personal history of nicotine dependence 2021    Anemia 2021    Angiodysplasia of intestine 2021    Microscopic hematuria 2021    Diabetes mellitus ([facility] [address] 2021    Dyslipidemia 2021    Gastroesophageal reflux disease 2021    Hypoxemia 2021    Incontinence 2021    Urge incontinence of urine 2021    Overweight with body mass index (BMI) 25.0-29.9 2021    Postinfective urethral stricture in female 2021    Supracondylar fracture of humerus 2021    Symptoms involving urinary system 2021    B12 deficiency 2020    Bilateral hearing loss 2019    Bilateral tinnitus 2019    Bilateral otitis media 2019    Former smoker, stopped smoking in distant past 2019    Hypertension 2019    Hyperlipidemia 2019    Atrial fibrillation ([facility] [address] 2019    Polyp of descending colon 2016    Polyp of sigmoid colon 2016    Rectal polyp 2016    Hx of colonic polyps 2016    Chronic diarrhea 2016    Nuclear senile cataract 09/05/2014    Chronic GI bleeding 03/12/2013    Gastrointestinal hemorrhage 03/12/2013    Gastroenteropathy 10/17/2012    Iron deficiency anemia 10/15/2012    Pulmonary emphysema ([facility] [address] 10/04/2012    Coronary arteriosclerosis 10/04/2012    Myocardial infarction ([facility] [facility] 10/04/2012    Peripheral vascular disease ([facility] 10/04/2012        Past Medical History:   Diagnosis Date    Arthritis     CAD (coronary artery disease)     cardiac stents x 3    Cancer ([facility] [facility]     right breast    COPD (chronic obstructive pulmonary disease) ([address]     smoker since age 10    Diabetes mellitus ([facility] [address]     hx > 30 yrs    GERD (gastroesophageal reflux disease)     History of blood transfusion 07/2019    blood transfusions x 3 due to lower GI bleed / [facility].    Hyperlipidemia     meds > 10 yrs    Hypertension     meds > 10 yrs    PVD (peripheral vascular disease) ([address]     both legs     Past Surgical History:   Procedure Laterality Date    BREAST SURGERY Right 1996    mastectomy due to malignancy / chemo to follow    CARDIAC CATHETERIZATION      COLONOSCOPY  08/25/2016    Camila Dunne MD    CORONARY ANGIOPLASTY WITH STENT PLACEMENT      cardiac stents x 3    ENDOSCOPY, COLON, DIAGNOSTIC      EYE SURGERY      Phaco with IOL OS    HYSTERECTOMY  1968    MYRINGOTOMY AND TYMPANOSTOMY TUBE PLACEMENT Bilateral 8/8/2019    BILATERAL MYRINGOTOMY WITH VENTILATING  TUBE INSERTION performed by Selena Mccarthy MD at [address] [place] [address]      as child    TUNNELED VENOUS PORT PLACEMENT  2015    used for Iron infusions       Patient assessed for rehabilitation services?: Yes  Family / Caregiver Present: No    Restrictions:  Restrictions/Precautions: Up as Tolerated     SUBJECTIVE:   Subjective: \"I don't need therapy\"    Pain  Pain: 0/10    Prior Level of Function:  Social/Functional History  Lives With: Spouse  Type of Home: House  Home Layout: One level  Home Access: Level entry  Bathroom Shower/Tub: Walk-in shower  Bathroom Toilet: Standard  Bathroom Equipment: Grab bars in shower,Shower chair,Hand-held shower,Grab bars around toilet  Has the patient had two or more falls in the past year or any fall with injury in the past year?: No  ADL Assistance: [address]: Independent  Homemaking Responsibilities: Yes  Ambulation Assistance: Independent (no AD)  Transfer Assistance: Independent  Active : Yes  Additional Comments: goes grocery shopping    OBJECTIVE:   Vision  Vision: Impaired  Vision Exceptions: Wears glasses for reading  [facility]  Hearing Exceptions: Right hearing aid    Cognition:  Overall Orientation Status: Within Functional Limits  Follows Commands: Within Functional Limits    Observation/Palpation  Posture: Fair  Observation: pt stated no vomiting; IV running; no signs of distress    ROM:  RLE AROM: WFL  LLE AROM : WFL    Strength:  Strength RLE  Strength RLE: WFL  Strength LLE  Strength LLE: WFL    Neuro:  Balance  Sitting - Static: Good  Sitting - Dynamic: Good  Standing - Static: Good  Standing - Dynamic: Good    Sensation: Intact    Bed mobility  Supine to Sit: Independent  Sit to Supine: Independent    Transfers  Sit to Stand: Independent  Stand to sit:  Independent    Ambulation  Surface: level tile  Device: No Device  Assistance: Independent  Quality of Gait: no unsteadiness  Gait Deviations: Slow Caro  Distance: 40ft    Stairs/Curb  Stairs?: No    Activity Tolerance  Activity Tolerance: Patient tolerated evaluation without incident    Patient Education  Education Given To: Patient  Education Provided: Role of Therapy;Plan of Care  Education Method: Verbal  Education Outcome: Verbalized understanding       ASSESSMENT:   Decision Making: Low Complexity  History: high  Exam: low  Clinical Presentation: low    Therapy Prognosis: Good    DISCHARGE RECOMMENDATIONS:  No Skilled PT: Independent with functional mobility     Assessment: Pt demonstrating functional mobility at indep level without LOB. No continued PT indicated at this time. Requires PT Follow-Up: No      PLAN OF CARE:  Plan  Plan Comment: Pt indep, no continued PT indicated    Safety Devices  Type of Devices: Call light within reach,Left in bed    [facility] (6 CLICK) [address] Mobility Raw Score : 24     Therapy Time:   Individual   Time In 1545   Time Out 1600   Minutes [address] [place], [place], 05/05/22 at 3:59 PM         Definitions for assistance levels  Independent = pt does not require any physical supervision or assistance from another person for activity completion. Device may be needed.   Stand by assistance = pt requires verbal cues or instructions from another person, close to but not touching, to perform the activity  Minimal assistance= pt performs 75% or more of the activity; assistance is required to complete the activity  Moderate assistance= pt performs 50% of the activity; assistance is required to complete the activity  Maximal assistance = pt performs 25% of the activity; assistance is required to complete the activity  Dependent = pt requires total physical assistance to accomplish the task

## 2022-05-05 NOTE — PROGRESS NOTES
DVT / VTE PROPHYLAXIS EVALUATION    Estimated Creatinine Clearance: 37 mL/min (A) (based on SCr of 1.19 mg/dL (H)). Recent Labs     05/05/22  1254   BUN 23   CREATININE 1.19*      HGB 12.7   HCT 38.7     ADMITTING DX OR CHIEF COMPLAINT? Abdominal pain  WARFARIN? DOAC'S? no  ANY APPARENT BLEEDING? no  SCHEDULED SURGERY? no     Current order:  Enoxaparin 40 mg SUBQ once daily      Plan:  No intervention recommended, continue current VTE prophylaxis as ordered     Patient Weight (kg)      50.9 and below .9 101-150.9 151-174.9 175 or greater   Estimated   CrCl  (ml/min) 30 or greater []   30 mg   SUBQ daily   [x]   40 mg   SUBQ daily []  30 mg SUBQ   BID  []  40 mg   SUBQ   BID []  60mg SUBQ BID    15-29.9 []  UFH 5000   units SUBQ BID []  30 mg   SUBQ daily [] 30 mg SUBQ   daily []  40 mg SUBQ   daily [] 60 mg SUBQ   daily    Less than 15 or dialysis []  UFH 5000   units SUBQ BID [] UFH 5000 units SUBQ TID []  UFH 7500   units   SUBQ TID       Continue renal monitoring.      Rufina Cohen [facility] [place] PharmD

## 2022-05-06 LAB
ANION GAP SERPL CALCULATED.3IONS-SCNC: 9 MEQ/L (ref 9–15)
BASOPHILS ABSOLUTE: 0.1 K/UL (ref 0–0.2)
BASOPHILS RELATIVE PERCENT: 0.9 %
BUN BLDV-MCNC: 17 MG/DL (ref 8–23)
CALCIUM SERPL-MCNC: 8.6 MG/DL (ref 8.5–9.9)
CHLORIDE BLD-SCNC: 107 MEQ/L (ref 95–107)
CO2: 26 MEQ/L (ref 20–31)
CREAT SERPL-MCNC: 1.06 MG/DL (ref 0.5–0.9)
EOSINOPHILS ABSOLUTE: 0.3 K/UL (ref 0–0.7)
EOSINOPHILS RELATIVE PERCENT: 3.6 %
GFR AFRICAN AMERICAN: >60
GFR NON-AFRICAN AMERICAN: 50.7
GLUCOSE BLD-MCNC: 102 MG/DL (ref 70–99)
GLUCOSE BLD-MCNC: 106 MG/DL (ref 70–99)
GLUCOSE BLD-MCNC: 109 MG/DL (ref 70–99)
GLUCOSE BLD-MCNC: 112 MG/DL (ref 70–99)
GLUCOSE BLD-MCNC: 124 MG/DL (ref 70–99)
HCT VFR BLD CALC: 36.7 % (ref 37–47)
HEMOGLOBIN: 11.8 G/DL (ref 12–16)
LYMPHOCYTES ABSOLUTE: 1.8 K/UL (ref 1–4.8)
LYMPHOCYTES RELATIVE PERCENT: 21.3 %
MCH RBC QN AUTO: 28.2 PG (ref 27–31.3)
MCHC RBC AUTO-ENTMCNC: 32.3 % (ref 33–37)
MCV RBC AUTO: 87.4 FL (ref 82–100)
MONOCYTES ABSOLUTE: 0.6 K/UL (ref 0.2–0.8)
MONOCYTES RELATIVE PERCENT: 6.9 %
NEUTROPHILS ABSOLUTE: 5.7 K/UL (ref 1.4–6.5)
NEUTROPHILS RELATIVE PERCENT: 67.3 %
PDW BLD-RTO: 16.4 % (ref 11.5–14.5)
PERFORMED ON: ABNORMAL
PLATELET # BLD: 226 K/UL (ref 130–400)
POTASSIUM REFLEX MAGNESIUM: 4.6 MEQ/L (ref 3.4–4.9)
RBC # BLD: 4.2 M/UL (ref 4.2–5.4)
SODIUM BLD-SCNC: 142 MEQ/L (ref 135–144)
WBC # BLD: 8.5 K/UL (ref 4.8–10.8)

## 2022-05-06 PROCEDURE — 94761 N-INVAS EAR/PLS OXIMETRY MLT: CPT

## 2022-05-06 PROCEDURE — 2580000003 HC RX 258: Performed by: INTERNAL MEDICINE

## 2022-05-06 PROCEDURE — 80048 BASIC METABOLIC PNL TOTAL CA: CPT

## 2022-05-06 PROCEDURE — 85025 COMPLETE CBC W/AUTO DIFF WBC: CPT

## 2022-05-06 PROCEDURE — 36415 COLL VENOUS BLD VENIPUNCTURE: CPT

## 2022-05-06 PROCEDURE — 94640 AIRWAY INHALATION TREATMENT: CPT

## 2022-05-06 PROCEDURE — 6370000000 HC RX 637 (ALT 250 FOR IP): Performed by: INTERNAL MEDICINE

## 2022-05-06 PROCEDURE — 97165 OT EVAL LOW COMPLEX 30 MIN: CPT

## 2022-05-06 PROCEDURE — 6360000002 HC RX W HCPCS: Performed by: INTERNAL MEDICINE

## 2022-05-06 PROCEDURE — 1210000000 HC MED SURG R&B

## 2022-05-06 RX ORDER — SENNA AND DOCUSATE SODIUM 50; 8.6 MG/1; MG/1
1 TABLET, FILM COATED ORAL DAILY
Status: DISCONTINUED | OUTPATIENT
Start: 2022-05-06 | End: 2022-05-07 | Stop reason: HOSPADM

## 2022-05-06 RX ORDER — LACTULOSE 10 G/15ML
20 SOLUTION ORAL ONCE
Status: COMPLETED | OUTPATIENT
Start: 2022-05-06 | End: 2022-05-06

## 2022-05-06 RX ADMIN — CEFTRIAXONE SODIUM 1000 MG: 1 INJECTION, POWDER, FOR SOLUTION INTRAMUSCULAR; INTRAVENOUS at 15:02

## 2022-05-06 RX ADMIN — SENNOSIDES AND DOCUSATE SODIUM 1 TABLET: 50; 8.6 TABLET ORAL at 16:47

## 2022-05-06 RX ADMIN — ENOXAPARIN SODIUM 40 MG: 100 INJECTION SUBCUTANEOUS at 09:10

## 2022-05-06 RX ADMIN — TIOTROPIUM BROMIDE INHALATION SPRAY 2 PUFF: 3.12 SPRAY, METERED RESPIRATORY (INHALATION) at 07:53

## 2022-05-06 RX ADMIN — BUDESONIDE AND FORMOTEROL FUMARATE DIHYDRATE 2 PUFF: 160; 4.5 AEROSOL RESPIRATORY (INHALATION) at 07:53

## 2022-05-06 RX ADMIN — ASPIRIN 81 MG 81 MG: 81 TABLET ORAL at 09:11

## 2022-05-06 RX ADMIN — FLUTICASONE PROPIONATE 1 SPRAY: 50 SPRAY, METERED NASAL at 11:35

## 2022-05-06 RX ADMIN — TROSPIUM CHLORIDE 20 MG: 20 TABLET, FILM COATED ORAL at 06:45

## 2022-05-06 RX ADMIN — SODIUM CHLORIDE: 9 INJECTION, SOLUTION INTRAVENOUS at 10:41

## 2022-05-06 RX ADMIN — ATORVASTATIN CALCIUM 40 MG: 40 TABLET, FILM COATED ORAL at 16:47

## 2022-05-06 RX ADMIN — BUDESONIDE AND FORMOTEROL FUMARATE DIHYDRATE 2 PUFF: 160; 4.5 AEROSOL RESPIRATORY (INHALATION) at 20:25

## 2022-05-06 RX ADMIN — SOTALOL HYDROCHLORIDE 120 MG: 120 TABLET ORAL at 21:01

## 2022-05-06 RX ADMIN — SOTALOL HYDROCHLORIDE 120 MG: 120 TABLET ORAL at 09:10

## 2022-05-06 RX ADMIN — LACTULOSE 20 G: 20 SOLUTION ORAL at 15:02

## 2022-05-06 RX ADMIN — TROSPIUM CHLORIDE 20 MG: 20 TABLET, FILM COATED ORAL at 16:47

## 2022-05-06 RX ADMIN — SODIUM CHLORIDE, PRESERVATIVE FREE 10 ML: 5 INJECTION INTRAVENOUS at 21:01

## 2022-05-06 RX ADMIN — SODIUM CHLORIDE, PRESERVATIVE FREE 10 ML: 5 INJECTION INTRAVENOUS at 09:11

## 2022-05-06 ASSESSMENT — PAIN SCALES - GENERAL: PAINLEVEL_OUTOF10: 0

## 2022-05-06 NOTE — PLAN OF CARE
Problem: Pain  Goal: Verbalizes/displays adequate comfort level or baseline comfort level  Flowsheets (Taken 5/6/2022 0220)  Verbalizes/displays adequate comfort level or baseline comfort level:   Encourage patient to monitor pain and request assistance   Assess pain using appropriate pain scale   Administer analgesics based on type and severity of pain and evaluate response   Consider cultural and social influences on pain and pain management

## 2022-05-06 NOTE — PROGRESS NOTES
[facility]ALUATION - ACUTE     NAME: Ninfa Hall  : 8 (71 y.o.)  MRN: [id number]  CODE STATUS: Full Code  [facility]    Date of Service: 2022    Patient Diagnosis(es): Dehydration [E86.0]  Tobacco dependence [F17.200]  UTI (urinary tract infection) [N39.0]  Food intolerance in adult [K90.49]  Acute cystitis with hematuria [N30.01]  Abdominal pain, unspecified abdominal location [R10.9]  Non-intractable vomiting with nausea, unspecified vomiting type [R11.2]   Patient Active Problem List    Diagnosis Date Noted    UTI (urinary tract infection) 2022    Personal history of nicotine dependence 2021    Anemia 2021    Angiodysplasia of intestine 2021    Microscopic hematuria 2021    Diabetes mellitus ([facility] 2021    Dyslipidemia 2021    Gastroesophageal reflux disease 2021    Hypoxemia 2021    Incontinence 2021    Urge incontinence of urine 2021    Overweight with body mass index (BMI) 25.0-29.9 2021    Postinfective urethral stricture in female 2021    Supracondylar fracture of humerus 2021    Symptoms involving urinary system 2021    B12 deficiency 2020    Bilateral hearing loss 2019    Bilateral tinnitus 2019    Bilateral otitis media 2019    Former smoker, stopped smoking in distant past 2019    Hypertension 2019    Hyperlipidemia 2019    Atrial fibrillation ([address] 2019    Polyp of descending colon 2016    Polyp of sigmoid colon 2016    Rectal polyp 2016    Hx of colonic polyps 2016    Chronic diarrhea 2016    Nuclear senile cataract 2014    Chronic GI bleeding 2013    Gastrointestinal hemorrhage 2013    Gastroenteropathy 10/17/2012    Iron deficiency anemia 10/15/2012    Pulmonary emphysema ([facility] 10/04/2012    Coronary arteriosclerosis 10/04/2012    Myocardial infarction [facility]) 10/04/2012    Peripheral vascular disease [facility] 10/04/2012        Past Medical History:   Diagnosis Date    Arthritis     CAD (coronary artery disease)     cardiac stents x 3    Cancer ([facility]     right breast    COPD (chronic obstructive pulmonary disease) ([facility]     smoker since age 10    Diabetes mellitus ([facility]     hx > 30 yrs    GERD (gastroesophageal reflux disease)     History of blood transfusion 07/2019    blood transfusions x 3 due to lower GI bleed / [facility].    Hyperlipidemia     meds > 10 yrs    Hypertension     meds > 10 yrs    PVD (peripheral vascular disease) ([facility]     both legs     Past Surgical History:   Procedure Laterality Date    BREAST SURGERY Right 1996    mastectomy due to malignancy / chemo to follow    CARDIAC CATHETERIZATION      COLONOSCOPY  08/25/2016    Ramirez Cornelius MD    CORONARY ANGIOPLASTY WITH STENT PLACEMENT      cardiac stents x 3    ENDOSCOPY, COLON, DIAGNOSTIC      EYE SURGERY      [place] with IOL OS    HYSTERECTOMY  1968    MYRINGOTOMY AND TYMPANOSTOMY TUBE PLACEMENT Bilateral 8/8/2019    BILATERAL MYRINGOTOMY WITH VENTILATING  TUBE INSERTION performed by John Us MD at [address] [place] [address]      as child    TUNNELED VENOUS PORT PLACEMENT  2015    used for Iron infusions        Restrictions  Restrictions/Precautions: Up as Tolerated              Safety Devices: Safety Devices  Type of Devices: Call light within reach,Left in bed     General:       Subjective:\"I can't hear so well. \"       Pain at start of treatment:    No  Location: No  Nursing notified: Not applicable    Pain at end of treatment:   No    Prior Level of Function:  Social/Functional History  Lives With: Spouse  Type of Home: House  Home Layout: One level  Home Access: Level entry  Bathroom Shower/Tub: Walk-in shower  Bathroom Toilet: Standard  Bathroom Equipment: Grab bars in shower,Shower chair,Hand-held shower,Grab bars around toilet  Has the patient had two or more falls in the past year or any fall with injury in the past year?: No  ADL Assistance: [address]: Independent  Homemaking Responsibilities: Yes  Ambulation Assistance: Independent (no AD)  Transfer Assistance: Independent  Active : Yes  Additional Comments: goes grocery shopping    OBJECTIVE:     Orientation Status:  Orientation  Overall Orientation Status: Within Functional Limits    Observation:  Observation/Palpation  Posture: Good  Observation: IV in place    Cognition Status:  Cognition  Cognition Comment: follows one step commands consistently    Perception Status:  Perception  Overall Perceptual Status: WFL    Vision and Hearing Status:  Vision  Vision Exceptions: Wears glasses for reading  Hearing  Hearing: Exceptions to [facility]  Hearing Exceptions: Right hearing aid   Vision - Basic Assessment  Prior Vision: Wears glasses only for reading  Visual History: No significant visual history  Patient Visual Report: No visual complaint reported. Visual Field Cut: No  Oculo Motor Control: WNL    GROSS ASSESSMENT AROM/PROM:  AROM: Within functional limits       ROM:   LUE AROM (degrees)  LUE AROM : WFL  Left Hand AROM (degrees)  Left Hand AROM: WFL  RUE AROM (degrees)  RUE AROM : WFL  Right Hand AROM (degrees)  Right Hand AROM: WFL    UE STRENGTH:  Strength:  Within functional limits    UE COORDINATION:  Coordination: Within functional limits    UE TONE:  Tone: Normal    UE SENSATION:  Sensation: Intact    Hand Dominance:  Hand Dominance  Hand Dominance: Right    ADL Status:  ADL  Feeding: Unable to assess(comment)  Grooming: Setup  UE Bathing: Setup  LE Bathing: Setup  UE Dressing: Independent  LE Dressing: Independent  Toileting: Unable to assess(comment)          Functional Mobility:     Sitting: Intact  Standing: Intact    Bed Mobility  Bed mobility  Supine to Sit: Independent  Sit to Supine: Independent    Seated and Standing Balance:  Sitting: Intact  Standing: Intact    Functional Endurance:  Activity Tolerance  Activity Tolerance: Patient Tolerated treatment well    D/C Recommendations:  OT D/C RECOMMENDATIONS  REQUIRES OT FOLLOW-UP: No    Equipment Recommendations:       OT Education:        OT Follow Up:  OT D/C RECOMMENDATIONS  REQUIRES OT FOLLOW-UP: No       Assessment/Discharge Disposition:     Prognosis: Good  Discharge Recommendations: Continue to assess pending progress  No Skilled OT: Independent with ADL's  Decision Making: Low Complexity  History: 6 comple  Exam: no deficits  Assistance / Modification: no assist    AMPAC (Six Click) Self care Score    How much help for putting on and taking off regular lower body clothing?: None  How much help for Bathing?: None  How much help for Toileting?: None  How much help for putting on and taking off regular upper body clothing?: None  How much help for taking care of personal grooming?: None  How much help for eating meals?: None  AM-[facility] Inpatient Daily Activity Raw Score: 24  AM-PAC Inpatient ADL T-Scale Score : 57.54  ADL Inpatient CMS 0-100% Score: 0    Therapy key for assistance levels -   Independent = Pt. is able to perform task with no assistance but may require a device   Stand by assistance = Pt. does not perform task at an independent level but does not need physical assistance, requires verbal cues  Minimal, Moderate, Maximal Assistance = Pt. requires physical assistance (25%, 50%, 75% assist from helper) for task but is able to actively participate in task   Dependent = Pt. requires total assistance with task and is not able to actively participate with task completion       Plan:  Plan  Times per Week: N/A    Goals:   Patient will:        Patient Goal: Patient goals :  To return to home when ready      Discussed and agreed upon: Yes Comments:       Therapy Time:   Individual   Time In 0745   Time Out 0755   Minutes 10     Minute Variance:       Eval: 10 minutes     Electronically signed by:    Jb Rock JESSE Mariscal/L,   3/6/0168, 8:26 AM Electronically signed by JESSE Zhou/L on 2/2/70 at 8:25 AM EDT

## 2022-05-06 NOTE — ACP (ADVANCE CARE PLANNING)
Advance Care Planning     Advance Care Planning Activator (Inpatient)  Conversation Note      Date of ACP Conversation: 5/5/2022     Conversation Conducted with: Patient with Decision Making Capacity    ACP Activator: Jarek Fortune RN    Pt states that she does have a living will and that it is current with her wishes as listed below. Health Care Decision Maker:     Current Designated Health Care Decision Maker:     Primary Decision Maker: Hernandez Stanford - Spouse - [phone]      Care Preferences    Ventilation: \"If you were in your present state of health and suddenly became very ill and were unable to breathe on your own, what would your preference be about the use of a ventilator (breathing machine) if it were available to you? \"      Would the patient desire the use of ventilator (breathing machine)?: yes    \"If your health worsens and it becomes clear that your chance of recovery is unlikely, what would your preference be about the use of a ventilator (breathing machine) if it were available to you? \"     Would the patient desire the use of ventilator (breathing machine)?: No      Resuscitation  \"CPR works best to restart the heart when there is a sudden event, like a heart attack, in someone who is otherwise healthy. Unfortunately, CPR does not typically restart the heart for people who have serious health conditions or who are very sick. \"    \"In the event your heart stopped as a result of an underlying serious health condition, would you want attempts to be made to restart your heart (answer \"yes\" for attempt to resuscitate) or would you prefer a natural death (answer \"no\" for do not attempt to resuscitate)? \" yes       [x] Yes   [] No   Educated Patient / Ben Hernandez regarding differences between Advance Directives and portable DNR orders.     Length of ACP Conversation in minutes:  10  Conversation Outcomes:  [x] ACP discussion completed  [] Existing advance directive reviewed with patient; no changes to patient's previously recorded wishes  [] New Advance Directive completed  [] Portable Do Not Rescitate prepared for Provider review and signature  [] POLST/POST/MOLST/MOST prepared for Provider review and signature      Follow-up plan:    [] Schedule follow-up conversation to continue planning  [] Referred individual to Provider for additional questions/concerns   [] Advised patient/agent/surrogate to review completed ACP document and update if needed with changes in condition, patient preferences or care setting    [x] This note routed to one or more involved healthcare providers

## 2022-05-06 NOTE — CARE COORDINATION
[facility] AT [place] Case Management Initial Discharge Assessment    Met with Patient to discuss discharge plan. PCP: Noam Todd MD                                Date of Last Visit: 5/5    [facility] Patient: No        [facility] Notified: no    If no PCP, list provided? N/A    Discharge Planning    Living Arrangements: independently at home    Who do you live with? FAMILY    Who helps you with your care:  self    If lives at home:     Do you have any barriers navigating in your home? no    Patient can perform ADL? Yes    Current Services (outpatient and in home) :  None    Dialysis: No    Is transportation available to get to your appointments? Yes    DME Equipment:  no    Respiratory equipment: None    Respiratory provider:  no     Pharmacy:  yes - [facility] IN [place], OR MAIL ORDER    Consult with Medication Assistance Program?  No      Patient agreeable to [facility]? Declined    Patient agreeable to SNF/Rehab? Declined    Other discharge needs identified? REQUESTING ORDER FOR NEBULIZER    Does Patient Have a High-Risk for Readmission Diagnosis (CHF, PN, MI, COPD)? No      Initial Discharge Plan? (Note: please see concurrent daily documentation for any updates after initial note). RETURN HOME , DENIES DC NEEDS.     Readmission Risk              Risk of Unplanned Readmission:  10         Electronically signed by Connie Hu RN on 5/6/2022 at 11:51 AM

## 2022-05-06 NOTE — PROGRESS NOTES
Progress Note  Date:2022       [facility]:[id number]  Patient Name:Mer Schulz     Date of Birth:     Age:72 y.o. Subjective      No acute events overnight. Developed wet cough overnight, occasionally productive of scant clear sputum. No increased dyspnea or hypoxia developed. Feels slightly improved with regards to abdominal discomfort. No vomiting overnight, decreased nausea. No other new/acute complaints. Objective         Vitals Last 24 Hours:  TEMPERATURE:  Temp  Av.6 °F (36.4 °C)  Min: 97.2 °F (36.2 °C)  Max: 98.1 °F (36.7 °C)  RESPIRATIONS RANGE: Resp  Av.3  Min: 16  Max: 18  PULSE OXIMETRY RANGE: SpO2  Av.5 %  Min: 95 %  Max: 98 %  PULSE RANGE: Pulse  Av.5  Min: 62  Max: 70  BLOOD PRESSURE RANGE: Systolic (12ZHK), JKP:431 , Min:94 , JJQ:836   ; Diastolic (93CTD), BDF:12, Min:56, Max:60    I/O (24Hr): No intake or output data in the 24 hours ending 22 0747  Objective   Constitutional: Elderly female reclining in bed no acute distress  Head: NCAT  Eyes: PERRLA, EOMI  ENT: Hard of hearing, with hearing aids in place. Mildly dry anterior oropharynx. Neck: Trachea midline, phonation normal  Cardiovascular: RRR, + 3/6 systolic murmur appreciated best at LLSB. Warm and well perfused peripherally. Decreased skin turgor with appreciable tenting. Left chest port noted. Hx right mastectomy noted. Pulmonary: Normal rate and effort of respiration on room air. CTA B. No coughing during exam.  Abdomen: Soft, nontense, nondistended. Trace suprapubic tenderness to palpation. Neurologic: Grossly alert and oriented. No focal neurologic deficits appreciated. Psychiatric: Pleasant, calm, cooperative.     Labs/Imaging/Diagnostics    Labs:  CBC:Recent Labs     22  1254 22  0529   WBC 10.9* 8.5   RBC 4.48 4.20   HGB 12.7 11.8*   HCT 38.7 36.7*   MCV 86.5 87.4   RDW 16.1* 16.4*    959     CHEMISTRIES:  Recent Labs     22  1254 22  0586 * 142   K 4.3 4.6    107   CO2 22 26   BUN 23 17   CREATININE 1.19* 1.06*   GLUCOSE 115* 109*   MG 2.5*  --      PT/INR:No results for input(s): PROTIME, INR in the last 72 hours. APTT:No results for input(s): APTT in the last 72 hours. LIVER PROFILE:  Recent Labs     05/05/22  1254   AST 14   ALT 11   BILITOT 0.3   ALKPHOS 94       Imaging Last 24 Hours:  CT ABDOMEN PELVIS W IV CONTRAST Additional Contrast? None    Result Date: 5/5/2022  CT ABDOMEN PELVIS W IV CONTRAST: 5/5/2022 CLINICAL HISTORY:  6 days of vomiting and abdominal pain; SBO vs pancreatitis . COMPARISON: 6/28/2016. TECHNIQUE: Spiral images were obtained of the abdomen and pelvis after the uneventful intravenous administration of approximately 100 mL of Isovue-300 contrast. All CT scans at this facility use dose modulation, iterative reconstruction, and/or weight based dosing when appropriate to reduce radiation dose to as low as reasonably achievable. FINDINGS: There is no abnormal bowel or biliary dilatation, ascites, inflammatory changes, significant lymphadenopathy, or other significant changes from 6/20/2016 identified. Very small nonobstructing renal calculi, a small cyst in the lower pole left kidney, moderate atherosclerotic plaquing of a moderately ectatic abdominal aorta and normal caliber branch vessels, and postoperative changes from previous hysterectomy are again noted. The liver, gallbladder, spleen, pancreas, adrenal glands, unopacified bowel loops, urinary bladder, and additional images of the pelvis are unremarkable. Mild to moderate degenerative changes of the thoracolumbar spine are again noted. A very small fat-containing umbilical hernia is present. Mild probable atelectasis or scarring is noted of the visualized lung bases. An approximately 8 mm subpleural left lower lobe pulmonary nodule appears unchanged. NO ACUTE INTRA-ABDOMINAL PROCESS OR SIGNIFICANT CHANGE FROM 6/28/2016 IDENTIFIED.  CHRONIC FINDINGS, AS NOTED. Assessment//Plan           Hospital Problems           Last Modified POA    * (Principal) UTI (urinary tract infection) 5/5/2022 Yes        Assessment & Plan    Principal Problem:  · UTI (urinary tract infection)  · Intractable nausea/vomiting in setting of above     Chronic problems:  · CAD s/p stent x3  · COPD (not on home O2)  · DM2 on PO agents at home  · GERD  · HTN  · HLD  · PVD  · Arthritis  · Hx right side breast cancer  · [place]  · Chronic anemia on OP iron infusions via port     Plan:  · Admit to inpatient status  · Continue empiric ceftriaxone initiated in ED  · Follow urine culture for C&S results  · Antiemetics for N/V  · Conservative IV fluids with NS at 75 mL/HR x48 hours. Monitor closely for volume overload. · Low sliding scale insulin AC 3 times daily and nightly. · Adult cardiac diabetic GI bland diet as tolerated  · Continue/hold home medications as ordered after medication history completed  5/6: WBCs improving. Slight clinical interval improvement overnight. No further vomiting, nausea improving. Development and interval of somewhat wet cough, however no hypoxia or dyspnea associated. Consideration for 2 view CXR later today if cough worsening. Reported sensation of possible constipation, for which stool softener will be given. Evaluated by PT/OT: Physically independent, no therapy needs.       Electronically signed by Clayton Worthy DO on 5/6/22 at 7:47 AM EDT

## 2022-05-07 VITALS
OXYGEN SATURATION: 96 % | DIASTOLIC BLOOD PRESSURE: 54 MMHG | BODY MASS INDEX: 27.34 KG/M2 | TEMPERATURE: 97.3 F | RESPIRATION RATE: 14 BRPM | SYSTOLIC BLOOD PRESSURE: 165 MMHG | HEART RATE: 56 BPM | HEIGHT: 62 IN | WEIGHT: 148.59 LBS

## 2022-05-07 LAB
ANION GAP SERPL CALCULATED.3IONS-SCNC: 10 MEQ/L (ref 9–15)
BASOPHILS ABSOLUTE: 0.1 K/UL (ref 0–0.2)
BASOPHILS RELATIVE PERCENT: 1 %
BUN BLDV-MCNC: 11 MG/DL (ref 8–23)
CALCIUM SERPL-MCNC: 9.3 MG/DL (ref 8.5–9.9)
CHLORIDE BLD-SCNC: 109 MEQ/L (ref 95–107)
CO2: 25 MEQ/L (ref 20–31)
CREAT SERPL-MCNC: 0.86 MG/DL (ref 0.5–0.9)
EOSINOPHILS ABSOLUTE: 0.3 K/UL (ref 0–0.7)
EOSINOPHILS RELATIVE PERCENT: 3.1 %
GFR AFRICAN AMERICAN: >60
GFR NON-AFRICAN AMERICAN: >60
GLUCOSE BLD-MCNC: 101 MG/DL (ref 70–99)
GLUCOSE BLD-MCNC: 89 MG/DL (ref 70–99)
GLUCOSE BLD-MCNC: 92 MG/DL (ref 70–99)
HCT VFR BLD CALC: 36.6 % (ref 37–47)
HEMOGLOBIN: 12.2 G/DL (ref 12–16)
LYMPHOCYTES ABSOLUTE: 1.5 K/UL (ref 1–4.8)
LYMPHOCYTES RELATIVE PERCENT: 18.9 %
MCH RBC QN AUTO: 28.5 PG (ref 27–31.3)
MCHC RBC AUTO-ENTMCNC: 33.2 % (ref 33–37)
MCV RBC AUTO: 85.8 FL (ref 82–100)
MONOCYTES ABSOLUTE: 0.5 K/UL (ref 0.2–0.8)
MONOCYTES RELATIVE PERCENT: 6.3 %
NEUTROPHILS ABSOLUTE: 5.6 K/UL (ref 1.4–6.5)
NEUTROPHILS RELATIVE PERCENT: 70.7 %
ORGANISM: ABNORMAL
PDW BLD-RTO: 16 % (ref 11.5–14.5)
PERFORMED ON: ABNORMAL
PERFORMED ON: NORMAL
PLATELET # BLD: 224 K/UL (ref 130–400)
POTASSIUM REFLEX MAGNESIUM: 4.5 MEQ/L (ref 3.4–4.9)
RBC # BLD: 4.27 M/UL (ref 4.2–5.4)
SODIUM BLD-SCNC: 144 MEQ/L (ref 135–144)
URINE CULTURE, ROUTINE: ABNORMAL
URINE CULTURE, ROUTINE: ABNORMAL
WBC # BLD: 8 K/UL (ref 4.8–10.8)

## 2022-05-07 PROCEDURE — 80048 BASIC METABOLIC PNL TOTAL CA: CPT

## 2022-05-07 PROCEDURE — 6360000002 HC RX W HCPCS: Performed by: INTERNAL MEDICINE

## 2022-05-07 PROCEDURE — 6370000000 HC RX 637 (ALT 250 FOR IP): Performed by: INTERNAL MEDICINE

## 2022-05-07 PROCEDURE — 94640 AIRWAY INHALATION TREATMENT: CPT

## 2022-05-07 PROCEDURE — 85025 COMPLETE CBC W/AUTO DIFF WBC: CPT

## 2022-05-07 PROCEDURE — 94760 N-INVAS EAR/PLS OXIMETRY 1: CPT

## 2022-05-07 PROCEDURE — 36415 COLL VENOUS BLD VENIPUNCTURE: CPT

## 2022-05-07 RX ORDER — CIPROFLOXACIN 500 MG/1
500 TABLET, FILM COATED ORAL EVERY 12 HOURS SCHEDULED
Status: DISCONTINUED | OUTPATIENT
Start: 2022-05-07 | End: 2022-05-07

## 2022-05-07 RX ORDER — NITROFURANTOIN 25; 75 MG/1; MG/1
100 CAPSULE ORAL EVERY 12 HOURS SCHEDULED
Qty: 10 CAPSULE | Refills: 0 | Status: SHIPPED | OUTPATIENT
Start: 2022-05-07 | End: 2022-05-12

## 2022-05-07 RX ORDER — NITROFURANTOIN 25; 75 MG/1; MG/1
100 CAPSULE ORAL EVERY 12 HOURS SCHEDULED
Status: DISCONTINUED | OUTPATIENT
Start: 2022-05-07 | End: 2022-05-07 | Stop reason: HOSPADM

## 2022-05-07 RX ADMIN — CIPROFLOXACIN HYDROCHLORIDE 500 MG: 500 TABLET, FILM COATED ORAL at 10:52

## 2022-05-07 RX ADMIN — BUDESONIDE AND FORMOTEROL FUMARATE DIHYDRATE 2 PUFF: 160; 4.5 AEROSOL RESPIRATORY (INHALATION) at 07:52

## 2022-05-07 RX ADMIN — TIOTROPIUM BROMIDE INHALATION SPRAY 2 PUFF: 3.12 SPRAY, METERED RESPIRATORY (INHALATION) at 07:52

## 2022-05-07 RX ADMIN — SENNOSIDES AND DOCUSATE SODIUM 1 TABLET: 50; 8.6 TABLET ORAL at 09:20

## 2022-05-07 RX ADMIN — NITROFURANTOIN (MONOHYDRATE/MACROCRYSTALS) 100 MG: 75; 25 CAPSULE ORAL at 14:37

## 2022-05-07 RX ADMIN — SOTALOL HYDROCHLORIDE 120 MG: 120 TABLET ORAL at 09:20

## 2022-05-07 RX ADMIN — FLUTICASONE PROPIONATE 1 SPRAY: 50 SPRAY, METERED NASAL at 09:19

## 2022-05-07 RX ADMIN — TROSPIUM CHLORIDE 20 MG: 20 TABLET, FILM COATED ORAL at 06:30

## 2022-05-07 RX ADMIN — ENOXAPARIN SODIUM 40 MG: 100 INJECTION SUBCUTANEOUS at 09:21

## 2022-05-07 ASSESSMENT — PAIN SCALES - GENERAL: PAINLEVEL_OUTOF10: 0

## 2022-05-07 NOTE — PROGRESS NOTES
Discharge instructions given to the patient, all of her belongings are accounted for, questions answered

## 2022-05-07 NOTE — PLAN OF CARE
Problem: Chronic Conditions and Co-morbidities  Goal: Patient's chronic conditions and co-morbidity symptoms are monitored and maintained or improved  Outcome: Progressing     Problem: Pain  Goal: Verbalizes/displays adequate comfort level or baseline comfort level  Outcome: Progressing     Problem: ABCDS Injury Assessment  Goal: Absence of physical injury  Outcome: Progressing     Problem: Safety - Adult  Goal: Free from fall injury  Outcome: Progressing

## 2022-05-07 NOTE — DISCHARGE INSTR - DIET

## 2022-05-07 NOTE — DISCHARGE SUMMARY
Discharge Summary    Date: 5/7/2022  Patient Name: Angélica Holly YOB: 1950 Age: 74 y.o. Admit Date: 5/5/2022  Discharge Date: 5/7/2022  Discharge Condition: Good    Admission Diagnosis  Dehydration (E86.0); Tobacco dependence (F17.200);UTI (urinary tract infection) (N39.0); Food intolerance in adult (K90.49); Acute cystitis with hematuria (N30.01); Abdominal pain, unspecified abdominal location (R10.9); Non-intractable vomiting with nausea, unspecified vomiting type (R11.2)     Discharge Diagnosis  Principal Problem: UTI (urinary tract infection)Resolved Problems: * No resolved hospital problems. [facility] Stay  Narrative of Hospital Course:  Patient is a 68-year-old female admitted 5/5/2022 in setting of 6 days of severe nausea/vomiting which were intractable to outpatient measures, with associated abdominal pain. She was found to have an E. coli UTI, treated to good effect with antibiotics, and with IV fluids for mild dehydration present on arrival. She improved quickly, and was deemed sufficiently improved and appropriate for discharge home with outpatient follow-up as of 5/7/2022. She will continue 5 further days of antibiotics orally as an outpatient to complete course of therapy for UTI. She will need outpatient post hospital visit follow-up with PCP clinic within the next week. Consultants:  None    Surgeries/procedures Performed:       Treatments:    Antibiotics and IV Hydration    Ceftriaxone and Other    Discharge Plan/Disposition:  Home    Hospital/Incidental Findings Requiring Follow Up:    Patient Instructions:    Diet:    Activity:  For number of days (if applicable): Other Instructions:    Provider Follow-Up:   No follow-ups on file.      Significant Diagnostic Studies:    Recent Labs:  Admission on 05/05/2022Lipase                                        Date: 05/05/2022Value: 108*        Ref range: 12 - 95 U/L        Status: FinalLactic Acid Date: 05/05/2022Value: 1.6         Ref range: 0.5 - 2.2 mmol/L   Status: FinalColor, UA                                     Date: 05/05/2022Value: Yellow      Ref range: Straw/Yellow       Status: FinalClarity, UA                                   Date: 05/05/2022Value: CLOUDY*     Ref range: Clear              Status: FinalGlucose, Ur                                   Date: 05/05/2022Value: Negative    Ref range: Negative mg/dL     Status: FinalBilirubin Urine                               Date: 05/05/2022Value: Negative    Ref range: Negative           Status: FinalKetones, Urine                                Date: 05/05/2022Value: Negative    Ref range: Negative mg/dL     Status: FinalSpecific Gravity, UA                          Date: 05/05/2022Value: 1.023       Ref range: 1.005 - 1.030      Status: FinalBlood, Urine                                  Date: 05/05/2022Value: TRACE*      Ref range: Negative           Status: FinalpH, UA                                        Date: 05/05/2022Value: 6.0         Ref range: 5.0 - 9.0          Status: FinalProtein, UA                                   Date: 05/05/2022Value: TRACE*      Ref range: Negative mg/dL     Status: FinalUrobilinogen, Urine                           Date: 05/05/2022Value: 0.2         Ref range: <2.0 E.U./dL       Status: FinalNitrite, Urine                                Date: 05/05/2022Value: POSITIVE*   Ref range: Negative           Status: FinalLeukocyte Esterase, Urine                     Date: 05/05/2022Value: LARGE*      Ref range: Negative           Status: FinalUrine Reflex to Culture                       Date: 05/05/2022Value: Yes           Status: FinalAmphetamine Screen, Urine                     Date: 05/05/2022Value: Neg         Ref range: Negative <1000 n*  Status: FinalBarbiturate Screen, Ur                        Date: 05/05/2022Value: Neg         Ref range: Negative < 200 n*  Status: FinalBenzodiazepine Screen, Urine Date: 05/05/2022Value: Neg         Ref range: Negative < 200 n*  Status: FinalCannabinoid Scrn, Ur                          Date: 05/05/2022Value: Neg         Ref range: Negative < 50 ng*  Status: FinalCocaine Metabolite Screen, Urine              Date: 05/05/2022Value: Neg         Ref range: Negative < 300 n*  Status: FinalOpiate Scrn, Ur                               Date: 05/05/2022Value: Neg         Ref range: Negative < 300 n*  Status: FinalPCP Screen, Urine                             Date: 05/05/2022Value: Neg         Ref range: Negative < 25 ng*  Status: FinalMethadone Screen, Urine                       Date: 05/05/2022Value: Neg         Ref range: Negative <300 ng*  Status: FinalPropoxyphene Scrn, Ur                         Date: 05/05/2022Value: Neg         Ref range: Negative <300 ng*  Status: FinalOxycodone Urine                               Date: 05/05/2022Value: Neg         Ref range: Negative <100 ng*  Status: FinalDrug Screen Comment:                          Date: 05/05/2022Value: see below     Status: Final              Comment: This method is a screening test to detect only these drugclasses as part of a medical workup. Confirmatory testingby another method should be ordered if clinically indicated. WBC                                           Date: 05/05/2022Value: 10.9*       Ref range: 4.8 - 10.8 K/uL    Status: FinalRBC                                           Date: 05/05/2022Value: 4.48        Ref range: 4.20 - 5.40 M/uL   Status: FinalHemoglobin                                    Date: 05/05/2022Value: 12.7        Ref range: 12.0 - 16.0 g/dL   Status: FinalHematocrit                                    Date: 05/05/2022Value: 38.7        Ref range: 37.0 - 47.0 %      Status: FinalMCV                                           Date: 05/05/2022Value: 86.5        Ref range: 82.0 - 100.0 fL    Status: [address]                                           Date: 05/05/2022Value: 28.3 Ref range: 27.0 - 31.3 pg     Status: [address]                                          Date: 05/05/2022Value: 32.7*       Ref range: 33.0 - 37.0 %      Status: FinalRDW                                           Date: 05/05/2022Value: 16.1*       Ref range: 11.5 - 14.5 %      Status: FinalPlatelets                                     Date: 05/05/2022Value: 242         Ref range: 130 - 400 K/uL     Status: FinalNeutrophils %                                 Date: 05/05/2022Value: 71.8        Ref range: %                  Status: FinalLymphocytes %                                 Date: 05/05/2022Value: 17.1        Ref range: %                  Status: FinalMonocytes %                                   Date: 05/05/2022Value: 7.6         Ref range: %                  Status: FinalEosinophils %                                 Date: 05/05/2022Value: 2.3         Ref range: %                  Status: FinalBasophils %                                   Date: 05/05/2022Value: 1.2         Ref range: %                  Status: FinalNeutrophils Absolute                          Date: 05/05/2022Value: 7.8*        Ref range: 1.4 - 6.5 K/uL     Status: FinalLymphocytes Absolute                          Date: 05/05/2022Value: 1.9         Ref range: 1.0 - 4.8 K/uL     Status: FinalMonocytes Absolute                            Date: 05/05/2022Value: 0.8         Ref range: 0.2 - 0.8 K/uL     Status: FinalEosinophils Absolute                          Date: 05/05/2022Value: 0.2         Ref range: 0.0 - 0.7 K/uL     Status: FinalBasophils Absolute                            Date: 05/05/2022Value: 0.1         Ref range: 0.0 - 0.2 K/uL     Status: FinalSodium                                        Date: 05/05/2022Value: 134*        Ref range: 135 - 144 mEq/L    Status: FinalPotassium                                     Date: 05/05/2022Value: 4.3         Ref range: 3.4 - 4.9 mEq/L    Status: FinalChloride Date: 05/05/2022Value: 100         Ref range: 95 - 107 mEq/L     Status: FinalCO2                                           Date: 05/05/2022Value: 22          Ref range: 20 - 31 mEq/L      Status: FinalAnion Gap                                     Date: 05/05/2022Value: 12          Ref range: 9 - 15 mEq/L       Status: FinalGlucose                                       Date: 05/05/2022Value: 115*        Ref range: 70 - 99 mg/dL      Status: FinalBUN                                           Date: 05/05/2022Value: 23          Ref range: 8 - 23 mg/dL       Status: FinalCREATININE                                    Date: 05/05/2022Value: 1.19*       Ref range: 0.50 - 0.90 mg/dL  Status: FinalGFR Non-                      Date: 05/05/2022Value: 44.3*       Ref range: >60                Status: Final              Comment: >60 mL/min/1.73m2 EGFR, calc. for ages 25 and older using theMDRD formula (not corrected for weight), is valid for stablerenal function. GFR                           Date: 05/05/2022Value: 53.7*       Ref range: >60                Status: Final              Comment: >60 mL/min/1.73m2 EGFR, calc. for ages 25 and older using theMDRD formula (not corrected for weight), is valid for stablerenal function. Calcium                                       Date: 05/05/2022Value: 9.0         Ref range: 8.5 - 9.9 mg/dL    Status: FinalTotal Protein                                 Date: 05/05/2022Value: 6.6         Ref range: 6.3 - 8.0 g/dL     Status: FinalAlbumin                                       Date: 05/05/2022Value: 3.8         Ref range: 3.5 - 4.6 g/dL     Status: FinalTotal Bilirubin                               Date: 05/05/2022Value: 0.3         Ref range: 0.2 - 0.7 mg/dL    Status: FinalAlkaline Phosphatase                          Date: 05/05/2022Value: 94          Ref range: 40 - 130 U/L       Status: FinalALT                                           Date: 05/05/2022Value: 11          Ref range: 0 - 33 U/L         Status: FinalAST                                           Date: 05/05/2022Value: 14          Ref range: 0 - 35 U/L         Status: FinalGlobulin                                      Date: 05/05/2022Value: 2.8         Ref range: 2.3 - 3.5 g/dL     Status: FinalCRP                                           Date: 05/05/2022Value: 3.9         Ref range: 0.0 - 5.0 mg/L     Status: FinalMagnesium                                     Date: 05/05/2022Value: 2.5*        Ref range: 1.7 - 2.4 mg/dL    Status: FinalTotal CK                                      Date: 05/05/2022Value: 56          Ref range: 0 - 170 U/L        Status: FinalBacteria, UA                                  Date: 05/05/2022Value: MANY*       Ref range: Negative /HPF      Status: FinalHyaline Casts, UA                             Date: 05/05/2022Value: 1-3         Ref range: 0 - 5 /HPF         Status: [address] [facility], [facility]                                       Date: 05/05/2022Value: >100*       Ref range: 0 - 5 /HPF         Status: FinalRBC, UA                                       Date: 05/05/2022Value: 3-5*        Ref range: 0 - 5 /HPF         Status: FinalEpithelial Cells, UA                          Date: 05/05/2022Value: 0-2         Ref range: 0 - 5 /HPF         Status: FinalUrine Culture, Routine                        Date: 05/05/2022Value:  *            Status: Preliminary                 Value:Performed at [address] [place], [place] [id number]([phone]                                      Date: 05/05/2022Value: Escherichia coli                     Status: PreliminaryUrine Culture, Routine                        Date: 05/05/2022Value: >[id number] CFU/ML                     Status: PreliminaryPOC Glucose                                   Date: 05/05/2022Value: 93          Ref range: 70 - 99 mg/dl      Status: FinalPerformed on                                  Date: 05/05/2022Value: ACCU-CHEK     Status: FinalSodium                                        Date: 05/06/2022Value: 142         Ref range: 135 - 144 mEq/L    Status: FinalPotassium reflex Magnesium                    Date: 05/06/2022Value: 4.6         Ref range: 3.4 - 4.9 mEq/L    Status: FinalChloride                                      Date: 05/06/2022Value: 107         Ref range: 95 - 107 mEq/L     Status: FinalCO2                                           Date: 05/06/2022Value: 26          Ref range: 20 - 31 mEq/L      Status: FinalAnion Gap                                     Date: 05/06/2022Value: 9           Ref range: 9 - 15 mEq/L       Status: FinalGlucose                                       Date: 05/06/2022Value: 109*        Ref range: 70 - 99 mg/dL      Status: FinalBUN                                           Date: 05/06/2022Value: 17          Ref range: 8 - 23 mg/dL       Status: FinalCREATININE                                    Date: 05/06/2022Value: 1.06*       Ref range: 0.50 - 0.90 mg/dL  Status: FinalGFR Non-                      Date: 05/06/2022Value: 50.7*       Ref range: >60                Status: Final              Comment: >60 mL/min/1.73m2 EGFR, calc. for ages 25 and older using theMDRD formula (not corrected for weight), is valid for stablerenal function. GFR                           Date: 05/06/2022Value: >60.0       Ref range: >60                Status: Final              Comment: >60 mL/min/1.73m2 EGFR, calc. for ages 25 and older using theMDRD formula (not corrected for weight), is valid for stablerenal function. Calcium                                       Date: 05/06/2022Value: 8.6         Ref range: 8.5 - 9.9 mg/dL    Status: [address] [facility]                                           Date: 05/06/2022Value: 8.5         Ref range: 4.8 - 10.8 K/uL    Status: FinalRBC                                           Date: 05/06/2022Value: 4.20        Ref range: 4.20 - 5.40 M/uL   Status: FinalHemoglobin                                    Date: 05/06/2022Value: 11.8*       Ref range: 12.0 - 16.0 g/dL   Status: FinalHematocrit                                    Date: 05/06/2022Value: 36.7*       Ref range: 37.0 - 47.0 %      Status: FinalMCV                                           Date: 05/06/2022Value: 87.4        Ref range: 82.0 - 100.0 fL    Status: [address]                                           Date: 05/06/2022Value: 28.2        Ref range: 27.0 - 31.3 pg     Status: [address]                                          Date: 05/06/2022Value: 32.3*       Ref range: 33.0 - 37.0 %      Status: FinalRDW                                           Date: 05/06/2022Value: 16.4*       Ref range: 11.5 - 14.5 %      Status: FinalPlatelets                                     Date: 05/06/2022Value: 226         Ref range: 130 - 400 K/uL     Status: FinalNeutrophils %                                 Date: 05/06/2022Value: 67.3        Ref range: %                  Status: FinalLymphocytes %                                 Date: 05/06/2022Value: 21.3        Ref range: %                  Status: FinalMonocytes %                                   Date: 05/06/2022Value: 6.9         Ref range: %                  Status: FinalEosinophils %                                 Date: 05/06/2022Value: 3.6         Ref range: %                  Status: FinalBasophils %                                   Date: 05/06/2022Value: 0.9         Ref range: %                  Status: FinalNeutrophils Absolute                          Date: 05/06/2022Value: 5.7         Ref range: 1.4 - 6.5 K/uL     Status: FinalLymphocytes Absolute                          Date: 05/06/2022Value: 1.8         Ref range: 1.0 - 4.8 K/uL     Status: FinalMonocytes Absolute                            Date: 05/06/2022Value: 0.6         Ref range: 0.2 - 0.8 K/uL     Status: FinalEosinophils Absolute                          Date: 05/06/2022Value: 0.3         Ref range: 0.0 - 0.7 K/uL     Status: FinalBasophils Absolute                            Date: 05/06/2022Value: 0.1         Ref range: 0.0 - 0.2 K/uL     Status: FinalPOC Glucose                                   Date: 05/05/2022Value: 142*        Ref range: 70 - 99 mg/dl      Status: FinalPerformed on                                  Date: 05/05/2022Value: ACCU-CHEK     Status: FinalPOC Glucose                                   Date: 05/06/2022Value: 112*        Ref range: 70 - 99 mg/dl      Status: FinalPerformed on                                  Date: 05/06/2022Value: ACCU-CHEK     Status: FinalPOC Glucose                                   Date: 05/06/2022Value: 124*        Ref range: 70 - 99 mg/dl      Status: FinalPerformed on                                  Date: 05/06/2022Value: ACCU-CHEK     Status: FinalPOC Glucose                                   Date: 05/06/2022Value: 102*        Ref range: 70 - 99 mg/dl      Status: FinalPerformed on                                  Date: 05/06/2022Value: ACCU-CHEK     Status: FinalSodium                                        Date: 05/07/2022Value: 144         Ref range: 135 - 144 mEq/L    Status: FinalPotassium reflex Magnesium                    Date: 05/07/2022Value: 4.5         Ref range: 3.4 - 4.9 mEq/L    Status: FinalChloride                                      Date: 05/07/2022Value: 109*        Ref range: 95 - 107 mEq/L     Status: FinalCO2                                           Date: 05/07/2022Value: 25          Ref range: 20 - 31 mEq/L      Status: FinalAnion Gap                                     Date: 05/07/2022Value: 10          Ref range: 9 - 15 mEq/L       Status: FinalGlucose                                       Date: 05/07/2022Value: 92          Ref range: 70 - 99 mg/dL      Status: FinalBUN                                           Date: 05/07/2022Value: 11          Ref range: 8 - 23 mg/dL       Status: [facility]                                    Date: 05/07/2022Value: 0.86        Ref range: 0.50 - 0.90 mg/dL  Status: FinalGFR Non-                      Date: 05/07/2022Value: >60.0       Ref range: >60                Status: Final              Comment: >60 mL/min/1.73m2 EGFR, calc. for ages 25 and older using theMDRD formula (not corrected for weight), is valid for stablerenal function. GFR                           Date: 05/07/2022Value: >60.0       Ref range: >60                Status: Final              Comment: >60 mL/min/1.73m2 EGFR, calc. for ages 25 and older using theMDRD formula (not corrected for weight), is valid for stablerenal function. Calcium                                       Date: 05/07/2022Value: 9.3         Ref range: 8.5 - 9.9 mg/dL    Status: [address] [facility]                                           Date: 05/07/2022Value: 8.0         Ref range: 4.8 - 10.8 K/uL    Status: FinalRBC                                           Date: 05/07/2022Value: 4.27        Ref range: 4.20 - 5.40 M/uL   Status: FinalHemoglobin                                    Date: 05/07/2022Value: 12.2        Ref range: 12.0 - 16.0 g/dL   Status: FinalHematocrit                                    Date: 05/07/2022Value: 36.6*       Ref range: 37.0 - 47.0 %      Status: FinalMCV                                           Date: 05/07/2022Value: 85.8        Ref range: 82.0 - 100.0 fL    Status: [address] [place]                                           Date: 05/07/2022Value: 28.5        Ref range: 27.0 - 31.3 pg     Status: [address]                                          Date: 05/07/2022Value: 33.2        Ref range: 33.0 - 37.0 %      Status: FinalRDW                                           Date: 05/07/2022Value: 16.0*       Ref range: 11.5 - 14.5 %      Status: FinalPlatelets                                     Date: 05/07/2022Value: 224         Ref range: 130 - 400 K/uL     Status: FinalNeutrophils % Date: 05/07/2022Value: 70.7        Ref range: %                  Status: FinalLymphocytes %                                 Date: 05/07/2022Value: 18.9        Ref range: %                  Status: FinalMonocytes %                                   Date: 05/07/2022Value: 6.3         Ref range: %                  Status: FinalEosinophils %                                 Date: 05/07/2022Value: 3.1         Ref range: %                  Status: FinalBasophils %                                   Date: 05/07/2022Value: 1.0         Ref range: %                  Status: FinalNeutrophils Absolute                          Date: 05/07/2022Value: 5.6         Ref range: 1.4 - 6.5 K/uL     Status: FinalLymphocytes Absolute                          Date: 05/07/2022Value: 1.5         Ref range: 1.0 - 4.8 K/uL     Status: FinalMonocytes Absolute                            Date: 05/07/2022Value: 0.5         Ref range: 0.2 - 0.8 K/uL     Status: FinalEosinophils Absolute                          Date: 05/07/2022Value: 0.3         Ref range: 0.0 - 0.7 K/uL     Status: FinalBasophils Absolute                            Date: 05/07/2022Value: 0.1         Ref range: 0.0 - 0.2 K/uL     Status: FinalPOC Glucose                                   Date: 05/06/2022Value: 106*        Ref range: 70 - 99 mg/dl      Status: FinalPerformed on                                  Date: 05/06/2022Value: ACCU-CHEK     Status: FinalPOC Glucose                                   Date: 05/07/2022Value: 89          Ref range: 70 - 99 mg/dl      Status: FinalPerformed on                                  Date: 05/07/2022Value: ACCU-CHEK     Status: FinalPOC Glucose                                   Date: 05/07/2022Value: 101*        Ref range: 70 - 99 mg/dl      Status: FinalPerformed on                                  Date: 05/07/2022Value: ACCU-CHEK     Status: Final------------    Radiology last 7 days:  CT ABDOMEN PELVIS W IV CONTRAST Additional Contrast? NoneResult Date: 5/5/2022NO ACUTE INTRA-ABDOMINAL PROCESS OR SIGNIFICANT CHANGE FROM 6/28/2016 IDENTIFIED. CHRONIC FINDINGS, AS NOTED. Pending Labs   Order Current Status  Culture, Urine Preliminary result      Discharge Medications    Current Discharge Medication ListSTART taking these medicationsnitrofurantoin, macrocrystal-monohydrate, (MACROBID) 100 MG capsuleTake 1 capsule by mouth every 12 hours for 10 dosesQty: 10 capsule Refills: 0    Current Discharge Medication List    Current Discharge Medication ListCONTINUE these medications which have NOT CHANGEDamLODIPine (NORVASC) 5 MG tabletTake by mouthibuprofen (ADVIL;MOTRIN) 600 MG tabletTake by mouthmetFORMIN (GLUCOPHAGE) 1000 MG tabletTake 1 tablet by mouth 2 times daily (with meals)Qty: 60 tablet Refills: 2estradiol (ESTRACE) 0.1 MG/GM vaginal creamVaginally. Finger tip dose every other nightlisinopril (PRINIVIL;ZESTRIL) 10 MG tabletTake 10 mg by mouth gmffjgaiivtdqiyf-ujnkpavwa-vcqree (TRELEGY ELLIPTA) 100-62.5-25 MCG/INH AEPBInhale 1 puff into the lungs dailyQty: 1 each Refills: 0Associated Diagnoses:Pulmonary emphysema, unspecified emphysema type (HCC)aspirin 81 MG tabletTake 81 mg by mouth daily Indications: every other day sotalol (BETAPACE) 80 MG tabletTake 120 mg by mouth 2 times dailyalbuterol (PROVENTIL) (2.5 MG/3ML) 0.083% nebulizer solutionTake 2.5 mg by nebulization every 6 hours as needed for [facility] 50 MG [id number] by mouth dailyatorvastatin (LIPITOR) 80 MG tabletTake 40 mg by mouth every evening fluticasone (FLONASE) 50 MCG/ACT nasal spray1 spraynitroGLYCERIN (MINITRAN) 0.4 MG/HRPlace under the tongue    Current Discharge Medication List    Time Spent on Discharge:3E] minutes were spent in patient examination, evaluation, counseling as well as medication reconciliation, prescriptions for required medications, discharge plan, and follow up.     Electronically signed by Sal Grimaldo DO on 5/7/22 at 12:17 PM EDT

## 2022-05-07 NOTE — PROGRESS NOTES
Assessment completed, patient alert and oriented x 4, denies any pain at this time, lungs clear, heart rate regular, bowel sounds active, last bm 5/6, no edema noted

## 2022-05-09 ENCOUNTER — OFFICE VISIT (OUTPATIENT)
Dept: FAMILY MEDICINE CLINIC | Age: 72
End: 2022-05-09
Payer: MEDICARE

## 2022-05-09 ENCOUNTER — NURSE ONLY (OUTPATIENT)
Dept: FAMILY MEDICINE CLINIC | Age: 72
End: 2022-05-09

## 2022-05-09 VITALS
HEART RATE: 61 BPM | HEIGHT: 62 IN | BODY MASS INDEX: 27.27 KG/M2 | OXYGEN SATURATION: 98 % | TEMPERATURE: 97.6 F | SYSTOLIC BLOOD PRESSURE: 146 MMHG | DIASTOLIC BLOOD PRESSURE: 66 MMHG | WEIGHT: 148.2 LBS

## 2022-05-09 VITALS — SYSTOLIC BLOOD PRESSURE: 107 MMHG | DIASTOLIC BLOOD PRESSURE: 56 MMHG

## 2022-05-09 DIAGNOSIS — Z01.30 BP CHECK: Primary | ICD-10-CM

## 2022-05-09 DIAGNOSIS — N30.00 ACUTE CYSTITIS WITHOUT HEMATURIA: ICD-10-CM

## 2022-05-09 DIAGNOSIS — I10 UNCONTROLLED HYPERTENSION: ICD-10-CM

## 2022-05-09 DIAGNOSIS — R11.11 NON-INTRACTABLE VOMITING WITHOUT NAUSEA, UNSPECIFIED VOMITING TYPE: Primary | ICD-10-CM

## 2022-05-09 PROBLEM — Z96.1 BILATERAL PSEUDOPHAKIA: Status: ACTIVE | Noted: 2022-05-09

## 2022-05-09 PROBLEM — R09.89 CAROTID BRUIT: Status: ACTIVE | Noted: 2022-05-09

## 2022-05-09 PROBLEM — I65.22 STENOSIS OF LEFT CAROTID ARTERY: Status: ACTIVE | Noted: 2022-05-09

## 2022-05-09 PROBLEM — F17.200 CURRENT SMOKER: Status: ACTIVE | Noted: 2022-05-09

## 2022-05-09 PROCEDURE — 1090F PRES/ABSN URINE INCON ASSESS: CPT | Performed by: FAMILY MEDICINE

## 2022-05-09 PROCEDURE — G8417 CALC BMI ABV UP PARAM F/U: HCPCS | Performed by: FAMILY MEDICINE

## 2022-05-09 PROCEDURE — 3017F COLORECTAL CA SCREEN DOC REV: CPT | Performed by: FAMILY MEDICINE

## 2022-05-09 PROCEDURE — G8427 DOCREV CUR MEDS BY ELIG CLIN: HCPCS | Performed by: FAMILY MEDICINE

## 2022-05-09 PROCEDURE — 1111F DSCHRG MED/CURRENT MED MERGE: CPT | Performed by: FAMILY MEDICINE

## 2022-05-09 PROCEDURE — 4004F PT TOBACCO SCREEN RCVD TLK: CPT | Performed by: FAMILY MEDICINE

## 2022-05-09 PROCEDURE — 1123F ACP DISCUSS/DSCN MKR DOCD: CPT | Performed by: FAMILY MEDICINE

## 2022-05-09 PROCEDURE — G8400 PT W/DXA NO RESULTS DOC: HCPCS | Performed by: FAMILY MEDICINE

## 2022-05-09 PROCEDURE — 99214 OFFICE O/P EST MOD 30 MIN: CPT | Performed by: FAMILY MEDICINE

## 2022-05-09 PROCEDURE — 4040F PNEUMOC VAC/ADMIN/RCVD: CPT | Performed by: FAMILY MEDICINE

## 2022-05-09 ASSESSMENT — ENCOUNTER SYMPTOMS
PHOTOPHOBIA: 0
ABDOMINAL PAIN: 0
CHEST TIGHTNESS: 0
ABDOMINAL DISTENTION: 0
SHORTNESS OF BREATH: 0

## 2022-05-09 ASSESSMENT — PATIENT HEALTH QUESTIONNAIRE - PHQ9
SUM OF ALL RESPONSES TO PHQ QUESTIONS 1-9: 0
2. FEELING DOWN, DEPRESSED OR HOPELESS: 0
SUM OF ALL RESPONSES TO PHQ9 QUESTIONS 1 & 2: 0
SUM OF ALL RESPONSES TO PHQ QUESTIONS 1-9: 0
1. LITTLE INTEREST OR PLEASURE IN DOING THINGS: 0
SUM OF ALL RESPONSES TO PHQ QUESTIONS 1-9: 0
SUM OF ALL RESPONSES TO PHQ QUESTIONS 1-9: 0

## 2022-05-09 NOTE — PROGRESS NOTES
Diagnosis Orders   1. Non-intractable vomiting without nausea, unspecified vomiting type     2. Uncontrolled hypertension     3. Acute cystitis without hematuria       Return in about 1 week (around 5/16/2022) for for review of outcome of today's recommendation. Patient Instructions   Patient will bring her home blood pressure monitor and meet with the nursing staff and confirm accuracy. Current bp uncontrolled    If the blood pressure cuff is accurate she will check her blood pressure at home a couple times a day various times and then return in 1 week to review the results to see if her blood pressures are elevated at home or just in the office visit. Complete antibiotics for urinary tract infection she is improving. Subjective:      Patient ID: Niki Santillan is a 74 y.o. female who presents for:  Chief Complaint   Patient presents with    Follow-Up from ELEONORA COLÓN     5/5/2022 [facility] appt scheduled in june        She is feeling significantly better from hospitalization. Having bowel movements. Tolerating her medication. states she does have a home blood pressure monitor that has not been checked for accuracy. Denies any cardiovascular symptoms. See review of systems. Current Outpatient Medications on File Prior to Visit   Medication Sig Dispense Refill    nitrofurantoin, macrocrystal-monohydrate, (MACROBID) 100 MG capsule Take 1 capsule by mouth every 12 hours for 10 doses 10 capsule 0    amLODIPine (NORVASC) 5 MG tablet Take by mouth      ibuprofen (ADVIL;MOTRIN) 600 MG tablet Take by mouth      metFORMIN (GLUCOPHAGE) 1000 MG tablet Take 1 tablet by mouth 2 times daily (with meals) 60 tablet 2    estradiol (ESTRACE) 0.1 MG/GM vaginal cream Vaginally.   Finger tip dose every other night      lisinopril (PRINIVIL;ZESTRIL) 10 MG tablet Take 10 mg by mouth daily      fluticasone-umeclidin-vilant (TRELEGY ELLIPTA) 100-62.5-25 MCG/INH AEPB Inhale 1 puff into the lungs daily 1 each 0    aspirin 81 MG tablet Take 81 mg by mouth daily Indications: every other day       sotalol (BETAPACE) 80 MG tablet Take 120 mg by mouth 2 times daily      albuterol (PROVENTIL) (2.5 MG/3ML) 0.083% nebulizer solution Take 2.5 mg by nebulization every 6 hours as needed for Wheezing      Mirabegron ER 50 MG TB24 Take by mouth daily      atorvastatin (LIPITOR) 80 MG tablet Take 40 mg by mouth every evening       fluticasone (FLONASE) 50 MCG/ACT nasal spray 1 spray      nitroGLYCERIN (MINITRAN) 0.4 MG/HR Place under the tongue       No current facility-administered medications on file prior to visit.      Past Medical History:   Diagnosis Date    Arthritis     CAD (coronary artery disease)     cardiac stents x 3    Cancer ([facility] [address]     right breast    COPD (chronic obstructive pulmonary disease) ([facility]     smoker since age 10    Diabetes mellitus ([facility] [address]     hx > 30 yrs    GERD (gastroesophageal reflux disease)     History of blood transfusion 07/2019    blood transfusions x 3 due to lower GI bleed / [facility].    Hyperlipidemia     meds > 10 yrs    Hypertension     meds > 10 yrs    PVD (peripheral vascular disease) ([facility] [address]     both legs     Past Surgical History:   Procedure Laterality Date    BREAST SURGERY Right 1996    mastectomy due to malignancy / chemo to follow    CARDIAC CATHETERIZATION      COLONOSCOPY  08/25/2016    Sanjeev Gagnon MD    CORONARY ANGIOPLASTY WITH STENT PLACEMENT      cardiac stents x 3    ENDOSCOPY, COLON, DIAGNOSTIC      EYE SURGERY      [place] with IOL OS    HYSTERECTOMY  1968    MYRINGOTOMY AND TYMPANOSTOMY TUBE PLACEMENT Bilateral 8/8/2019    BILATERAL MYRINGOTOMY WITH VENTILATING  TUBE INSERTION performed by Johanna Argueta MD at [address] [place] [address]      as child    TUNNELED VENOUS PORT PLACEMENT  2015    used for Iron infusions     Social History     Socioeconomic History    Marital status:      Spouse name: Not on file    Number of children: Not on file    Years of education: Not on file    Highest education level: Not on file   Occupational History    Not on file   Tobacco Use    Smoking status: Current Some Day Smoker     Packs/day: 0.25     Years: 50.00     Pack years: 12.50     Types: Cigarettes    Smokeless tobacco: Never Used   Vaping Use    Vaping Use: Never used   Substance and Sexual Activity    Alcohol use: No    Drug use: No    Sexual activity: Not on file   Other Topics Concern    Not on file   Social History Narrative    Not on file     Social Determinants of Health     Financial Resource Strain: Medium Risk    Difficulty of Paying Living Expenses: Somewhat hard   Food Insecurity: Food Insecurity Present    Worried About Running Out of Food in the Last Year: Often true    Damián of Food in the Last Year: Often true   Transportation Needs:     Lack of Transportation (Medical): Not on file    Lack of Transportation (Non-Medical):  Not on file   Physical Activity:     Days of Exercise per Week: Not on file    Minutes of Exercise per Session: Not on file   Stress:     Feeling of Stress : Not on file   Social Connections:     Frequency of Communication with Friends and Family: Not on file    Frequency of Social Gatherings with Friends and Family: Not on file    Attends Orthodox Services: Not on file    Active Member of [address] [place] [address] [employer] or Organizations: Not on file    Attends Club or Organization Meetings: Not on file    Marital Status: Not on file   Intimate Partner Violence:     Fear of Current or Ex-Partner: Not on file    Emotionally Abused: Not on file    Physically Abused: Not on file    Sexually Abused: Not on file   Housing Stability:     Unable to Pay for Housing in the Last Year: Not on file    Number of [place] in the Last Year: Not on file    Unstable Housing in the Last Year: Not on file     Family History   Problem Relation Age of Onset    Breast Cancer Mother     Diabetes Mother  Stroke Mother     High Blood Pressure Father     Heart Disease Father     High Cholesterol Father     Diabetes Father     Stroke Father     Cancer Sister         oral cancer    Diabetes Brother     Kidney Disease Brother     Alcohol Abuse Brother         liver problems    Cancer Brother         liver & lung cancer    Other Son         PTSD / blood dyscrasia    Cancer Sister         brain & lung cancer    Other Son         MVA at age 29     Allergies:  Diltiazem    Review of Systems   Constitutional: Negative for activity change, appetite change, chills, diaphoresis, fatigue, fever and unexpected weight change. Eyes: Negative for photophobia and visual disturbance. Respiratory: Negative for chest tightness and shortness of breath. No orthopnea   Cardiovascular: Negative for chest pain, palpitations and leg swelling. Gastrointestinal: Negative for abdominal distention and abdominal pain. Genitourinary: Negative for dysuria, flank pain, frequency, hematuria and urgency. Musculoskeletal: Negative for gait problem and joint swelling. Neurological: Negative for dizziness, weakness, light-headedness and headaches. Psychiatric/Behavioral: Negative for confusion. Objective:   BP (!) 146/66   Pulse 61   Temp 97.6 °F (36.4 °C)   Ht 5' 1.75\" (1.568 m)   Wt 148 lb 3.2 oz (67.2 kg)   SpO2 98%   BMI 27.33 kg/m²     Physical Exam  Constitutional:       General: She is not in acute distress. Appearance: She is well-developed. She is not diaphoretic. HENT:      Head: Normocephalic and atraumatic. Right Ear: External ear normal.      Left Ear: External ear normal.      Nose: Nose normal.   Eyes:      General:         Right eye: No discharge. Left eye: No discharge. Conjunctiva/sclera: Conjunctivae normal.      Pupils: Pupils are equal, round, and reactive to light. Neck:      Thyroid: No thyromegaly. Trachea: No tracheal deviation.    Cardiovascular: Rate and Rhythm: Normal rate and regular rhythm. Pulmonary:      Effort: Pulmonary effort is normal. No respiratory distress. Abdominal:      General: There is no distension. Musculoskeletal:      Cervical back: Neck supple. Skin:     General: Skin is warm and dry. Findings: No bruising or rash. Neurological:      Mental Status: She is alert. Coordination: Coordination normal.   Psychiatric:         Thought Content: Thought content normal.         Judgment: Judgment normal.         No results found for this visit on 05/09/22.     Recent Results (from the past 2016 hour(s))   Lipase    Collection Time: 05/05/22 12:54 PM   Result Value Ref Range    Lipase 108 (H) 12 - 95 U/L   Lactic Acid    Collection Time: 05/05/22 12:54 PM   Result Value Ref Range    Lactic Acid 1.6 0.5 - 2.2 mmol/L   CBC with Auto Differential    Collection Time: 05/05/22 12:54 PM   Result Value Ref Range    WBC 10.9 (H) 4.8 - 10.8 K/uL    RBC 4.48 4.20 - 5.40 M/uL    Hemoglobin 12.7 12.0 - 16.0 g/dL    Hematocrit 38.7 37.0 - 47.0 %    MCV 86.5 82.0 - 100.0 fL    MCH 28.3 27.0 - 31.3 pg    MCHC 32.7 (L) 33.0 - 37.0 %    RDW 16.1 (H) 11.5 - 14.5 %    Platelets 551 359 - 738 K/uL    Neutrophils % 71.8 %    Lymphocytes % 17.1 %    Monocytes % 7.6 %    Eosinophils % 2.3 %    Basophils % 1.2 %    Neutrophils Absolute 7.8 (H) 1.4 - 6.5 K/uL    Lymphocytes Absolute 1.9 1.0 - 4.8 K/uL    Monocytes Absolute 0.8 0.2 - 0.8 K/uL    Eosinophils Absolute 0.2 0.0 - 0.7 K/uL    Basophils Absolute 0.1 0.0 - 0.2 K/uL   Comprehensive Metabolic Panel    Collection Time: 05/05/22 12:54 PM   Result Value Ref Range    Sodium 134 (L) 135 - 144 mEq/L    Potassium 4.3 3.4 - 4.9 mEq/L    Chloride 100 95 - 107 mEq/L    CO2 22 20 - 31 mEq/L    Anion Gap 12 9 - 15 mEq/L    Glucose 115 (H) 70 - 99 mg/dL    BUN 23 8 - 23 mg/dL    CREATININE 1.19 (H) 0.50 - 0.90 mg/dL    GFR Non-African American 44.3 (L) >60    GFR  53.7 (L) >60    Calcium 9.0 8.5 - 9.9 mg/dL    Total Protein 6.6 6.3 - 8.0 g/dL    Albumin 3.8 3.5 - 4.6 g/dL    Total Bilirubin 0.3 0.2 - 0.7 mg/dL    Alkaline Phosphatase 94 40 - 130 U/L    ALT 11 0 - 33 U/L    AST 14 0 - 35 U/L    Globulin 2.8 2.3 - 3.5 g/dL   C-Reactive Protein    Collection Time: 05/05/22 12:54 PM   Result Value Ref Range    CRP 3.9 0.0 - 5.0 mg/L   Magnesium    Collection Time: 05/05/22 12:54 PM   Result Value Ref Range    Magnesium 2.5 (H) 1.7 - 2.4 mg/dL   CK    Collection Time: 05/05/22 12:54 PM   Result Value Ref Range    Total CK 56 0 - 170 U/L   Urinalysis with Reflex to Culture    Collection Time: 05/05/22  1:00 PM    Specimen: Urine   Result Value Ref Range    Color, UA Yellow Straw/Yellow    Clarity, UA CLOUDY (A) Clear    Glucose, Ur Negative Negative mg/dL    Bilirubin Urine Negative Negative    Ketones, Urine Negative Negative mg/dL    Specific Gravity, UA 1.023 1.005 - 1.030    Blood, Urine TRACE (A) Negative    pH, UA 6.0 5.0 - 9.0    Protein, UA TRACE (A) Negative mg/dL    Urobilinogen, Urine 0.2 <2.0 E.U./dL    Nitrite, Urine POSITIVE (A) Negative    Leukocyte Esterase, Urine LARGE (A) Negative    Urine Reflex to Culture Yes    Urine Drug Screen    Collection Time: 05/05/22  1:00 PM   Result Value Ref Range    Amphetamine Screen, Urine Neg Negative <1000 ng/mL    Barbiturate Screen, Ur Neg Negative < 200 ng/mL    Benzodiazepine Screen, Urine Neg Negative < 200 ng/mL    Cannabinoid Scrn, Ur Neg Negative < 50 ng/mL    Cocaine Metabolite Screen, Urine Neg Negative < 300 ng/mL    Opiate Scrn, Ur Neg Negative < 300 ng/mL    PCP Screen, Urine Neg Negative < 25 ng/mL    Methadone Screen, Urine Neg Negative <300 ng/mL    Propoxyphene Scrn, Ur Neg Negative <300 ng/mL    Oxycodone Urine Neg Negative <100 ng/mL    Drug Screen Comment: see below    Microscopic Urinalysis    Collection Time: 05/05/22  1:00 PM   Result Value Ref Range    Bacteria, UA MANY (A) Negative /HPF    Hyaline Casts, UA 1-3 0 - 5 /HPF    WBC, UA >100 (H) 0 - 5 /HPF    RBC, UA 3-5 (A) 0 - 5 /HPF    Epithelial Cells, UA 0-2 0 - 5 /HPF   Culture, Urine    Collection Time: 05/05/22  1:00 PM    Specimen: Urine, clean catch   Result Value Ref Range    Urine Culture, Routine (A)      Performed at [address] [place] [address]  [phone]      Organism Escherichia coli (A)     Urine Culture, Routine >[id number] CFU/ML        Susceptibility    Escherichia coli - BACTERIAL SUSCEPTIBILITY PANEL BY VADIM     ampicillin <=2 Sensitive mcg/mL     aztreonam <=1 Sensitive mcg/mL     ceFAZolin* <=4 Sensitive mcg/mL      * Cefazolin sensitivity results can be used to predict theeffectiveness of oral cephalosporins (eg.  Cephalexin) inuncomplicated Urinary Tract Infections due to E. coli, K. pneumoniae,and P. mirabilis     cefTRIAXone <=1 Sensitive mcg/mL     ciprofloxacin <=0.25 Sensitive mcg/mL     Confirmatory Extended Spectrum Beta-Lactamase NEGATIVE Sensitive mcg/mL     gentamicin <=1 Sensitive mcg/mL     nitrofurantoin <=16 Sensitive mcg/mL     piperacillin-tazobactam <=4 Sensitive mcg/mL     trimethoprim-sulfamethoxazole <=20 Sensitive mcg/mL   POCT Glucose    Collection Time: 05/05/22  5:18 PM   Result Value Ref Range    POC Glucose 93 70 - 99 mg/dl    Performed on ACCU-CHEK    POCT Glucose    Collection Time: 05/05/22  7:36 PM   Result Value Ref Range    POC Glucose 142 (H) 70 - 99 mg/dl    Performed on ACCU-CHEK    Basic Metabolic Panel w/ Reflex to MG    Collection Time: 05/06/22  5:29 AM   Result Value Ref Range    Sodium 142 135 - 144 mEq/L    Potassium reflex Magnesium 4.6 3.4 - 4.9 mEq/L    Chloride 107 95 - 107 mEq/L    CO2 26 20 - 31 mEq/L    Anion Gap 9 9 - 15 mEq/L    Glucose 109 (H) 70 - 99 mg/dL    BUN 17 8 - 23 mg/dL    CREATININE 1.06 (H) 0.50 - 0.90 mg/dL    GFR Non-African American 50.7 (L) >60    GFR  >60.0 >60    Calcium 8.6 8.5 - 9.9 mg/dL   CBC with Auto Differential    Collection Time: 05/06/22  5:29 AM   Result Value Ref Range    WBC 8.5 4.8 - 10.8 K/uL    RBC 4.20 4. 20 - 5.40 M/uL    Hemoglobin 11.8 (L) 12.0 - 16.0 g/dL    Hematocrit 36.7 (L) 37.0 - 47.0 %    MCV 87.4 82.0 - 100.0 fL    MCH 28.2 27.0 - 31.3 pg    MCHC 32.3 (L) 33.0 - 37.0 %    RDW 16.4 (H) 11.5 - 14.5 %    Platelets 892 817 - 811 K/uL    Neutrophils % 67.3 %    Lymphocytes % 21.3 %    Monocytes % 6.9 %    Eosinophils % 3.6 %    Basophils % 0.9 %    Neutrophils Absolute 5.7 1.4 - 6.5 K/uL    Lymphocytes Absolute 1.8 1.0 - 4.8 K/uL    Monocytes Absolute 0.6 0.2 - 0.8 K/uL    Eosinophils Absolute 0.3 0.0 - 0.7 K/uL    Basophils Absolute 0.1 0.0 - 0.2 K/uL   POCT Glucose    Collection Time: 05/06/22  8:05 AM   Result Value Ref Range    POC Glucose 112 (H) 70 - 99 mg/dl    Performed on ACCU-CHEK    POCT Glucose    Collection Time: 05/06/22  1:31 PM   Result Value Ref Range    POC Glucose 124 (H) 70 - 99 mg/dl    Performed on ACCU-CHEK    POCT Glucose    Collection Time: 05/06/22  4:44 PM   Result Value Ref Range    POC Glucose 102 (H) 70 - 99 mg/dl    Performed on ACCU-CHEK    POCT Glucose    Collection Time: 05/06/22  9:23 PM   Result Value Ref Range    POC Glucose 106 (H) 70 - 99 mg/dl    Performed on ACCU-CHEK    Basic Metabolic Panel w/ Reflex to MG    Collection Time: 05/07/22  6:04 AM   Result Value Ref Range    Sodium 144 135 - 144 mEq/L    Potassium reflex Magnesium 4.5 3.4 - 4.9 mEq/L    Chloride 109 (H) 95 - 107 mEq/L    CO2 25 20 - 31 mEq/L    Anion Gap 10 9 - 15 mEq/L    Glucose 92 70 - 99 mg/dL    BUN 11 8 - 23 mg/dL    CREATININE 0.86 0.50 - 0.90 mg/dL    GFR Non-African American >60.0 >60    GFR  >60.0 >60    Calcium 9.3 8.5 - 9.9 mg/dL   CBC with Auto Differential    Collection Time: 05/07/22  6:04 AM   Result Value Ref Range    WBC 8.0 4.8 - 10.8 K/uL    RBC 4.27 4.20 - 5.40 M/uL    Hemoglobin 12.2 12.0 - 16.0 g/dL    Hematocrit 36.6 (L) 37.0 - 47.0 %    MCV 85.8 82.0 - 100.0 fL    MCH 28.5 27.0 - 31.3 pg    MCHC 33.2 33.0 - 37.0 %    RDW 16.0 (H) 11.5 - 14.5 %    Platelets 331 829 - 896 K/uL    Neutrophils % 70.7 %    Lymphocytes % 18.9 %    Monocytes % 6.3 %    Eosinophils % 3.1 %    Basophils % 1.0 %    Neutrophils Absolute 5.6 1.4 - 6.5 K/uL    Lymphocytes Absolute 1.5 1.0 - 4.8 K/uL    Monocytes Absolute 0.5 0.2 - 0.8 K/uL    Eosinophils Absolute 0.3 0.0 - 0.7 K/uL    Basophils Absolute 0.1 0.0 - 0.2 K/uL   POCT Glucose    Collection Time: 05/07/22  7:54 AM   Result Value Ref Range    POC Glucose 89 70 - 99 mg/dl    Performed on ACCU-CHEK    POCT Glucose    Collection Time: 05/07/22 11:36 AM   Result Value Ref Range    POC Glucose 101 (H) 70 - 99 mg/dl    Performed on ACCU-CHEK        [] Pt was seen by provider for      Minutes  Counseling and coordination of care was done for all assessment diagnosis listed for today with patient and any family/friend present. More than 50% of this visit was spent coordinating current care, obtaining information for prior records, and counseling for current plan of action. Assessment:       Diagnosis Orders   1. Non-intractable vomiting without nausea, unspecified vomiting type     2. Uncontrolled hypertension     3. Acute cystitis without hematuria           No orders of the defined types were placed in this encounter. No orders of the defined types were placed in this encounter. Medication List          Accurate as of May 9, 2022 10:30 AM. If you have any questions, ask your nurse or doctor.             CONTINUE taking these medications    albuterol (2.5 MG/3ML) 0.083% nebulizer solution  Commonly known as: PROVENTIL     amLODIPine 5 MG tablet  Commonly known as: NORVASC     aspirin 81 MG tablet     estradiol 0.1 MG/GM vaginal cream  Commonly known as: ESTRACE     fluticasone 50 MCG/ACT nasal spray  Commonly known as: FLONASE     ibuprofen 600 MG tablet  Commonly known as: ADVIL;MOTRIN     Lipitor 80 MG tablet  Generic drug: atorvastatin     lisinopril 10 MG tablet  Commonly known as: PRINIVIL;ZESTRIL     metFORMIN 1000 MG tablet  Commonly known as: GLUCOPHAGE  Take 1 tablet by mouth 2 times daily (with meals)     Minitran 0.4 MG/HR  Generic drug: nitroGLYCERIN     mirabegron 50 MG Tb24  Commonly known as: MYRBETRIQ     nitrofurantoin (macrocrystal-monohydrate) 100 MG capsule  Commonly known as: MACROBID  Take 1 capsule by mouth every 12 hours for 10 doses     sotalol 80 MG tablet  Commonly known as: BETAPACE     Trelegy Ellipta 100-62.5-25 MCG/INH Aepb  Generic drug: fluticasone-umeclidin-vilant  Inhale 1 puff into the lungs daily              Plan:   Return in about 1 week (around 5/16/2022) for for review of outcome of today's recommendation. Patient Instructions   Patient will bring her home blood pressure monitor and meet with the nursing staff and confirm accuracy. Current bp uncontrolled    If the blood pressure cuff is accurate she will check her blood pressure at home a couple times a day various times and then return in 1 week to review the results to see if her blood pressures are elevated at home or just in the office visit. Complete antibiotics for urinary tract infection she is improving. This note was partially created with the assistance of dictation. This may lead to grammatical or spelling errors. Noé Calderon M.D.

## 2022-05-09 NOTE — PATIENT INSTRUCTIONS
Patient will bring her home blood pressure monitor and meet with the nursing staff and confirm accuracy. Current bp uncontrolled    If the blood pressure cuff is accurate she will check her blood pressure at home a couple times a day various times and then return in 1 week to review the results to see if her blood pressures are elevated at home or just in the office visit. Complete antibiotics for urinary tract infection she is improving.

## 2022-05-10 ENCOUNTER — TELEPHONE (OUTPATIENT)
Dept: FAMILY MEDICINE CLINIC | Age: 72
End: 2022-05-10

## 2022-05-10 NOTE — TELEPHONE ENCOUNTER
[facility] Trans[facility] Initial Follow Up Call    Outreach made within 2 business days of discharge: Yes    Patient: Jessie Ramirez Patient : 1950   MRN: [id number]  Reason for Admission: There are no discharge diagnoses documented for the most recent discharge. Discharge Date: 22       Spoke with: patient    Discharge department/facility: [facility] Interactive Patient Contact:  Was patient able to fill all prescriptions: Yes  Was patient instructed to bring all medications to the follow-up visit: Yes  Is patient taking all medications as directed in the discharge summary?  Yes  Does patient understand their discharge instructions: Yes  Does patient have questions or concerns that need addressed prior to 7-14 day follow up office visit: no    Scheduled appointment with PCP within 7-14 days    Follow Up  Future Appointments   Date Time Provider Frederick Mackey   2022  1:45 PM Bonner Merlin, MD [facility] [place]   2022 10:00 AM Bonner Merlin, MD [facility] [place]   2023  9:30 AM Bonner Merlin, MD [facility] [place]

## 2022-05-16 ENCOUNTER — OFFICE VISIT (OUTPATIENT)
Dept: FAMILY MEDICINE CLINIC | Age: 72
End: 2022-05-16
Payer: MEDICARE

## 2022-05-16 VITALS
HEIGHT: 62 IN | WEIGHT: 148 LBS | TEMPERATURE: 97.5 F | OXYGEN SATURATION: 98 % | DIASTOLIC BLOOD PRESSURE: 60 MMHG | HEART RATE: 71 BPM | BODY MASS INDEX: 27.23 KG/M2 | SYSTOLIC BLOOD PRESSURE: 114 MMHG

## 2022-05-16 DIAGNOSIS — Z23 PNEUMOCOCCAL VACCINE ADMINISTERED: ICD-10-CM

## 2022-05-16 DIAGNOSIS — R68.2 DRY MOUTH: ICD-10-CM

## 2022-05-16 DIAGNOSIS — Z23 NEED FOR PROPHYLACTIC VACCINATION AGAINST STREPTOCOCCUS PNEUMONIAE (PNEUMOCOCCUS): ICD-10-CM

## 2022-05-16 DIAGNOSIS — I10 PRIMARY HYPERTENSION: ICD-10-CM

## 2022-05-16 DIAGNOSIS — E11.59 TYPE 2 DIABETES MELLITUS WITH OTHER CIRCULATORY COMPLICATION, WITHOUT LONG-TERM CURRENT USE OF INSULIN (HCC): Primary | ICD-10-CM

## 2022-05-16 DIAGNOSIS — Z11.59 NEED FOR HEPATITIS C SCREENING TEST: ICD-10-CM

## 2022-05-16 LAB — HBA1C MFR BLD: 5.7 %

## 2022-05-16 PROCEDURE — G8400 PT W/DXA NO RESULTS DOC: HCPCS | Performed by: FAMILY MEDICINE

## 2022-05-16 PROCEDURE — 1090F PRES/ABSN URINE INCON ASSESS: CPT | Performed by: FAMILY MEDICINE

## 2022-05-16 PROCEDURE — G8417 CALC BMI ABV UP PARAM F/U: HCPCS | Performed by: FAMILY MEDICINE

## 2022-05-16 PROCEDURE — G8427 DOCREV CUR MEDS BY ELIG CLIN: HCPCS | Performed by: FAMILY MEDICINE

## 2022-05-16 PROCEDURE — 90732 PPSV23 VACC 2 YRS+ SUBQ/IM: CPT | Performed by: FAMILY MEDICINE

## 2022-05-16 PROCEDURE — 1111F DSCHRG MED/CURRENT MED MERGE: CPT | Performed by: FAMILY MEDICINE

## 2022-05-16 PROCEDURE — 99214 OFFICE O/P EST MOD 30 MIN: CPT | Performed by: FAMILY MEDICINE

## 2022-05-16 PROCEDURE — 2022F DILAT RTA XM EVC RTNOPTHY: CPT | Performed by: FAMILY MEDICINE

## 2022-05-16 PROCEDURE — 1123F ACP DISCUSS/DSCN MKR DOCD: CPT | Performed by: FAMILY MEDICINE

## 2022-05-16 PROCEDURE — 4004F PT TOBACCO SCREEN RCVD TLK: CPT | Performed by: FAMILY MEDICINE

## 2022-05-16 PROCEDURE — 3017F COLORECTAL CA SCREEN DOC REV: CPT | Performed by: FAMILY MEDICINE

## 2022-05-16 PROCEDURE — 3046F HEMOGLOBIN A1C LEVEL >9.0%: CPT | Performed by: FAMILY MEDICINE

## 2022-05-16 PROCEDURE — G0009 ADMIN PNEUMOCOCCAL VACCINE: HCPCS | Performed by: FAMILY MEDICINE

## 2022-05-16 PROCEDURE — 4040F PNEUMOC VAC/ADMIN/RCVD: CPT | Performed by: FAMILY MEDICINE

## 2022-05-16 PROCEDURE — 83036 HEMOGLOBIN GLYCOSYLATED A1C: CPT | Performed by: FAMILY MEDICINE

## 2022-05-16 ASSESSMENT — ENCOUNTER SYMPTOMS
ABDOMINAL DISTENTION: 0
PHOTOPHOBIA: 0
CHEST TIGHTNESS: 0
SHORTNESS OF BREATH: 0
ABDOMINAL PAIN: 0

## 2022-05-16 NOTE — PROGRESS NOTES
Diagnosis Orders   1. Non-intractable vomiting without nausea, unspecified vomiting type      2. Uncontrolled hypertension      3. Acute cystitis without hematuria         Return in about 1 week (around 5/16/2022) for for review of outcome of today's recommendation. Patient Instructions   Patient will bring her home blood pressure monitor and meet with the nursing staff and confirm accuracy. Current bp uncontrolled     If the blood pressure cuff is accurate she will check her blood pressure at home a couple times a day various times and then return in 1 week to review the results to see if her blood pressures are elevated at home or just in the office visit. Complete antibiotics for urinary tract infection she is improving. After obtaining consent, and per orders of Dr. Ivy Guevara, injection of Pneu 23 given in Left deltoid by Ludy Martinez MA. Patient instructed to remain in clinic for 20 minutes afterwards, and to report any adverse reaction to me immediately.

## 2022-05-16 NOTE — PATIENT INSTRUCTIONS
Patient will consider utilizing dry mouth rinses and mouthwashes more frequently around through her day. Patient can consider getting chewing gum or candies that contain xylitol to help with mouth moisture. No change in blood pressure medication at this time. Diabetes under good control. Patient may return for nurse visit with new blood pressure cuff when she is ready.   Recommend drug [place] brand arm cuff

## 2022-05-16 NOTE — PROGRESS NOTES
Diagnosis Orders   1. Type 2 diabetes mellitus with other circulatory complication, without long-term current use of insulin (HCC)  POCT glycosylated hemoglobin (Hb A1C)    HM DIABETES FOOT EXAM    HM DIABETES EYE EXAM    Lipid Panel    Microalbumin / Creatinine Urine Ratio   2. Primary hypertension  Lipid Panel   3. Dry mouth     4. Pneumococcal vaccine administered  PNEUMOVAX 23 subcutaneous/IM (Pneumococcal polysaccharide vaccine 23-valent >= 1yo)   5. Need for hepatitis C screening test  Hepatitis C Antibody   6. Need for prophylactic vaccination against Streptococcus pneumoniae (pneumococcus)       Return in about 3 months (around 8/16/2022) for for review of outcome of today's recommendation. Patient Instructions   Patient will consider utilizing dry mouth rinses and mouthwashes more frequently around through her day. Patient can consider getting chewing gum or candies that contain xylitol to help with mouth moisture. No change in blood pressure medication at this time. Diabetes under good control. Patient may return for nurse visit with new blood pressure cuff when she is ready. Recommend drug Earle brand arm cuff      Subjective:      Patient ID: Shazia Cardoso is a 74 y.o. female who presents for:  Chief Complaint   Patient presents with    Hypertension       Patient feeling well the exception of dry mouth. Has not been checking her sugars or her blood pressures. Her wrist cuff was found to be reading erroneously on the diastolic measurement. She does gargle and rinse her mouth after she uses her inhalers. States she has tried mouthwash for dry mouth but usually only uses it once a day when she brushes her teeth in the morning. Did not know there were other treatments available over-the-counter. No further symptoms of UTI.       Current Outpatient Medications on File Prior to Visit   Medication Sig Dispense Refill    amLODIPine (NORVASC) 5 MG tablet Take by mouth      ibuprofen (ADVIL;MOTRIN) 600 MG tablet Take by mouth      metFORMIN (GLUCOPHAGE) 1000 MG tablet Take 1 tablet by mouth 2 times daily (with meals) 60 tablet 2    estradiol (ESTRACE) 0.1 MG/GM vaginal cream Vaginally. Finger tip dose every other night      lisinopril (PRINIVIL;ZESTRIL) 10 MG tablet Take 10 mg by mouth daily      fluticasone-umeclidin-vilant (TRELEGY ELLIPTA) 100-62.5-25 MCG/INH AEPB Inhale 1 puff into the lungs daily 1 each 0    aspirin 81 MG tablet Take 81 mg by mouth daily Indications: every other day       sotalol (BETAPACE) 80 MG tablet Take 120 mg by mouth 2 times daily      albuterol (PROVENTIL) (2.5 MG/3ML) 0.083% nebulizer solution Take 2.5 mg by nebulization every 6 hours as needed for Wheezing      Mirabegron ER 50 MG TB24 Take by mouth daily      atorvastatin (LIPITOR) 80 MG tablet Take 40 mg by mouth every evening       fluticasone (FLONASE) 50 MCG/ACT nasal spray 1 spray      nitroGLYCERIN (MINITRAN) 0.4 MG/HR Place under the tongue       No current facility-administered medications on file prior to visit.      Past Medical History:   Diagnosis Date    Arthritis     CAD (coronary artery disease)     cardiac stents x 3    Cancer ([facility]     right breast    COPD (chronic obstructive pulmonary disease) ([facility]     smoker since age 10    Diabetes mellitus ([facility] [address]     hx > 30 yrs    GERD (gastroesophageal reflux disease)     History of blood transfusion 07/2019    blood transfusions x 3 due to lower GI bleed / [facility].    Hyperlipidemia     meds > 10 yrs    Hypertension     meds > 10 yrs    PVD (peripheral vascular disease) ([address]     both legs     Past Surgical History:   Procedure Laterality Date    BREAST SURGERY Right 1996    mastectomy due to malignancy / chemo to follow    CARDIAC CATHETERIZATION      COLONOSCOPY  08/25/2016    Camila Dunne MD    CORONARY ANGIOPLASTY WITH STENT PLACEMENT      cardiac stents x 3    ENDOSCOPY, COLON, DIAGNOSTIC      EYE SURGERY      [place] with IOL OS    HYSTERECTOMY  1968    MYRINGOTOMY AND TYMPANOSTOMY TUBE PLACEMENT Bilateral 8/8/2019    BILATERAL MYRINGOTOMY WITH VENTILATING  TUBE INSERTION performed by Therese Matt MD at [address] [place] [address]      as child    TUNNELED VENOUS PORT PLACEMENT  2015    used for Iron infusions     Social History     Socioeconomic History    Marital status:      Spouse name: Not on file    Number of children: Not on file    Years of education: Not on file    Highest education level: Not on file   Occupational History    Not on file   Tobacco Use    Smoking status: Current Some Day Smoker     Packs/day: 0.25     Years: 50.00     Pack years: 12.50     Types: Cigarettes    Smokeless tobacco: Never Used   Vaping Use    Vaping Use: Never used   Substance and Sexual Activity    Alcohol use: No    Drug use: No    Sexual activity: Not on file   Other Topics Concern    Not on file   Social History Narrative    Not on file     Social Determinants of Health     Financial Resource Strain: Medium Risk    Difficulty of Paying Living Expenses: Somewhat hard   Food Insecurity: Food Insecurity Present    Worried About Running Out of Food in the Last Year: Often true    Damián of Food in the Last Year: Often true   Transportation Needs:     Lack of Transportation (Medical): Not on file    Lack of Transportation (Non-Medical):  Not on file   Physical Activity:     Days of Exercise per Week: Not on file    Minutes of Exercise per Session: Not on file   Stress:     Feeling of Stress : Not on file   Social Connections:     Frequency of Communication with Friends and Family: Not on file    Frequency of Social Gatherings with Friends and Family: Not on file    Attends Nondenominational Services: Not on file    Active Member of Clubs or Organizations: Not on file    Attends Club or Organization Meetings: Not on file    Marital Status: Not on file   Intimate Partner Violence:     Fear of Current or Ex-Partner: Not on file    Emotionally Abused: Not on file    Physically Abused: Not on file    Sexually Abused: Not on file   Housing Stability:     Unable to Pay for Housing in the Last Year: Not on file    Number of Places Lived in the Last Year: Not on file    Unstable Housing in the Last Year: Not on file     Family History   Problem Relation Age of Onset    Breast Cancer Mother     Diabetes Mother     Stroke Mother     High Blood Pressure Father     Heart Disease Father     High Cholesterol Father     Diabetes Father     Stroke Father     Cancer Sister         oral cancer    Diabetes Brother     Kidney Disease Brother     Alcohol Abuse Brother         liver problems    Cancer Brother         liver & lung cancer    Other Son         PTSD / blood dyscrasia    Cancer Sister         brain & lung cancer    Other Son         MVA at age 29     Allergies:  Diltiazem    Review of Systems   Constitutional: Negative for activity change, appetite change, diaphoresis and unexpected weight change. Eyes: Negative for photophobia and visual disturbance. Respiratory: Negative for chest tightness and shortness of breath. No orthopnea   Cardiovascular: Negative for chest pain, palpitations and leg swelling. Gastrointestinal: Negative for abdominal distention and abdominal pain. Genitourinary: Negative for flank pain and frequency. Musculoskeletal: Negative for gait problem and joint swelling. Neurological: Negative for dizziness, weakness, light-headedness and headaches. Psychiatric/Behavioral: Negative for confusion. Objective:   /60   Pulse 71   Temp 97.5 °F (36.4 °C)   Ht 5' 1.75\" (1.568 m)   Wt 148 lb (67.1 kg)   SpO2 98%   BMI 27.29 kg/m²     Physical Exam  Constitutional:       General: She is not in acute distress. Appearance: She is well-developed. She is not diaphoretic. HENT:      Head: Normocephalic and atraumatic.       Right Ear: External ear normal. Left Ear: External ear normal.      Nose: Nose normal.      Mouth/Throat:      Mouth: Mucous membranes are dry. Pharynx: Oropharynx is clear. No oropharyngeal exudate or posterior oropharyngeal erythema. Eyes:      General:         Right eye: No discharge. Left eye: No discharge. Conjunctiva/sclera: Conjunctivae normal.      Pupils: Pupils are equal, round, and reactive to light. Neck:      Thyroid: No thyromegaly. Trachea: No tracheal deviation. Cardiovascular:      Rate and Rhythm: Normal rate and regular rhythm. Pulmonary:      Effort: Pulmonary effort is normal. No respiratory distress. Abdominal:      General: There is no distension. Musculoskeletal:      Cervical back: Neck supple. Skin:     General: Skin is warm and dry. Findings: No bruising or rash. Neurological:      Mental Status: She is alert. Coordination: Coordination normal.      Comments: Pt diabetic foot exam performed with the use of the Medline monofilament. Normal exam      Psychiatric:         Thought Content:  Thought content normal.         Judgment: Judgment normal.         Results for POC orders placed in visit on 05/16/22   POCT glycosylated hemoglobin (Hb A1C)   Result Value Ref Range    Hemoglobin A1C 5.7 %       Recent Results (from the past 2016 hour(s))   Lipase    Collection Time: 05/05/22 12:54 PM   Result Value Ref Range    Lipase 108 (H) 12 - 95 U/L   Lactic Acid    Collection Time: 05/05/22 12:54 PM   Result Value Ref Range    Lactic Acid 1.6 0.5 - 2.2 mmol/L   CBC with Auto Differential    Collection Time: 05/05/22 12:54 PM   Result Value Ref Range    WBC 10.9 (H) 4.8 - 10.8 K/uL    RBC 4.48 4.20 - 5.40 M/uL    Hemoglobin 12.7 12.0 - 16.0 g/dL    Hematocrit 38.7 37.0 - 47.0 %    MCV 86.5 82.0 - 100.0 fL    MCH 28.3 27.0 - 31.3 pg    MCHC 32.7 (L) 33.0 - 37.0 %    RDW 16.1 (H) 11.5 - 14.5 %    Platelets 183 463 - 495 K/uL    Neutrophils % 71.8 %    Lymphocytes % 17.1 % Monocytes % 7.6 %    Eosinophils % 2.3 %    Basophils % 1.2 %    Neutrophils Absolute 7.8 (H) 1.4 - 6.5 K/uL    Lymphocytes Absolute 1.9 1.0 - 4.8 K/uL    Monocytes Absolute 0.8 0.2 - 0.8 K/uL    Eosinophils Absolute 0.2 0.0 - 0.7 K/uL    Basophils Absolute 0.1 0.0 - 0.2 K/uL   Comprehensive Metabolic Panel    Collection Time: 05/05/22 12:54 PM   Result Value Ref Range    Sodium 134 (L) 135 - 144 mEq/L    Potassium 4.3 3.4 - 4.9 mEq/L    Chloride 100 95 - 107 mEq/L    CO2 22 20 - 31 mEq/L    Anion Gap 12 9 - 15 mEq/L    Glucose 115 (H) 70 - 99 mg/dL    BUN 23 8 - 23 mg/dL    CREATININE 1.19 (H) 0.50 - 0.90 mg/dL    GFR Non-African American 44.3 (L) >60    GFR  53.7 (L) >60    Calcium 9.0 8.5 - 9.9 mg/dL    Total Protein 6.6 6.3 - 8.0 g/dL    Albumin 3.8 3.5 - 4.6 g/dL    Total Bilirubin 0.3 0.2 - 0.7 mg/dL    Alkaline Phosphatase 94 40 - 130 U/L    ALT 11 0 - 33 U/L    AST 14 0 - 35 U/L    Globulin 2.8 2.3 - 3.5 g/dL   C-Reactive Protein    Collection Time: 05/05/22 12:54 PM   Result Value Ref Range    CRP 3.9 0.0 - 5.0 mg/L   Magnesium    Collection Time: 05/05/22 12:54 PM   Result Value Ref Range    Magnesium 2.5 (H) 1.7 - 2.4 mg/dL   CK    Collection Time: 05/05/22 12:54 PM   Result Value Ref Range    Total CK 56 0 - 170 U/L   Urinalysis with Reflex to Culture    Collection Time: 05/05/22  1:00 PM    Specimen: Urine   Result Value Ref Range    Color, UA Yellow Straw/Yellow    Clarity, UA CLOUDY (A) Clear    Glucose, Ur Negative Negative mg/dL    Bilirubin Urine Negative Negative    Ketones, Urine Negative Negative mg/dL    Specific Gravity, UA 1.023 1.005 - 1.030    Blood, Urine TRACE (A) Negative    pH, UA 6.0 5.0 - 9.0    Protein, UA TRACE (A) Negative mg/dL    Urobilinogen, Urine 0.2 <2.0 E.U./dL    Nitrite, Urine POSITIVE (A) Negative    Leukocyte Esterase, Urine LARGE (A) Negative    Urine Reflex to Culture Yes    Urine Drug Screen    Collection Time: 05/05/22  1:00 PM   Result Value Ref Range    Amphetamine Screen, Urine Neg Negative <1000 ng/mL    Barbiturate Screen, Ur Neg Negative < 200 ng/mL    Benzodiazepine Screen, Urine Neg Negative < 200 ng/mL    Cannabinoid Scrn, Ur Neg Negative < 50 ng/mL    Cocaine Metabolite Screen, Urine Neg Negative < 300 ng/mL    Opiate Scrn, Ur Neg Negative < 300 ng/mL    PCP Screen, Urine Neg Negative < 25 ng/mL    Methadone Screen, Urine Neg Negative <300 ng/mL    Propoxyphene Scrn, Ur Neg Negative <300 ng/mL    Oxycodone Urine Neg Negative <100 ng/mL    Drug Screen Comment: see below    Microscopic Urinalysis    Collection Time: 05/05/22  1:00 PM   Result Value Ref Range    Bacteria, UA MANY (A) Negative /HPF    Hyaline Casts, UA 1-3 0 - 5 /HPF    WBC, UA >100 (H) 0 - 5 /HPF    RBC, UA 3-5 (A) 0 - 5 /HPF    Epithelial Cells, UA 0-2 0 - 5 /HPF   Culture, Urine    Collection Time: 05/05/22  1:00 PM    Specimen: Urine, clean catch   Result Value Ref Range    Urine Culture, Routine (A)      Performed at [address] [place] [address], [address] [place] [address] Drive [address] ([phone]      Organism Escherichia coli (A)     Urine Culture, Routine >[id number] CFU/ML        Susceptibility    Escherichia coli - BACTERIAL SUSCEPTIBILITY PANEL BY VADIM     ampicillin <=2 Sensitive mcg/mL     aztreonam <=1 Sensitive mcg/mL     ceFAZolin* <=4 Sensitive mcg/mL      * Cefazolin sensitivity results can be used to predict theeffectiveness of oral cephalosporins (eg.  Cephalexin) inuncomplicated Urinary Tract Infections due to E. coli, K. pneumoniae,and P. mirabilis     cefTRIAXone <=1 Sensitive mcg/mL     ciprofloxacin <=0.25 Sensitive mcg/mL     Confirmatory Extended Spectrum Beta-Lactamase NEGATIVE Sensitive mcg/mL     gentamicin <=1 Sensitive mcg/mL     nitrofurantoin <=16 Sensitive mcg/mL     piperacillin-tazobactam <=4 Sensitive mcg/mL     trimethoprim-sulfamethoxazole <=20 Sensitive mcg/mL   POCT Glucose    Collection Time: 05/05/22  5:18 PM   Result Value Ref Range    POC Glucose 93 70 - 99 mg/dl    Performed on ACCU-CHEK    POCT Glucose    Collection Time: 05/05/22  7:36 PM   Result Value Ref Range    POC Glucose 142 (H) 70 - 99 mg/dl    Performed on ACCU-CHEK    Basic Metabolic Panel w/ Reflex to MG    Collection Time: 05/06/22  5:29 AM   Result Value Ref Range    Sodium 142 135 - 144 mEq/L    Potassium reflex Magnesium 4.6 3.4 - 4.9 mEq/L    Chloride 107 95 - 107 mEq/L    CO2 26 20 - 31 mEq/L    Anion Gap 9 9 - 15 mEq/L    Glucose 109 (H) 70 - 99 mg/dL    BUN 17 8 - 23 mg/dL    CREATININE 1.06 (H) 0.50 - 0.90 mg/dL    GFR Non-African American 50.7 (L) >60    GFR  >60.0 >60    Calcium 8.6 8.5 - 9.9 mg/dL   CBC with Auto Differential    Collection Time: 05/06/22  5:29 AM   Result Value Ref Range    WBC 8.5 4.8 - 10.8 K/uL    RBC 4.20 4. 20 - 5.40 M/uL    Hemoglobin 11.8 (L) 12.0 - 16.0 g/dL    Hematocrit 36.7 (L) 37.0 - 47.0 %    MCV 87.4 82.0 - 100.0 fL    MCH 28.2 27.0 - 31.3 pg    MCHC 32.3 (L) 33.0 - 37.0 %    RDW 16.4 (H) 11.5 - 14.5 %    Platelets 204 046 - 603 K/uL    Neutrophils % 67.3 %    Lymphocytes % 21.3 %    Monocytes % 6.9 %    Eosinophils % 3.6 %    Basophils % 0.9 %    Neutrophils Absolute 5.7 1.4 - 6.5 K/uL    Lymphocytes Absolute 1.8 1.0 - 4.8 K/uL    Monocytes Absolute 0.6 0.2 - 0.8 K/uL    Eosinophils Absolute 0.3 0.0 - 0.7 K/uL    Basophils Absolute 0.1 0.0 - 0.2 K/uL   POCT Glucose    Collection Time: 05/06/22  8:05 AM   Result Value Ref Range    POC Glucose 112 (H) 70 - 99 mg/dl    Performed on ACCU-CHEK    POCT Glucose    Collection Time: 05/06/22  1:31 PM   Result Value Ref Range    POC Glucose 124 (H) 70 - 99 mg/dl    Performed on ACCU-CHEK    POCT Glucose    Collection Time: 05/06/22  4:44 PM   Result Value Ref Range    POC Glucose 102 (H) 70 - 99 mg/dl    Performed on ACCU-CHEK    POCT Glucose    Collection Time: 05/06/22  9:23 PM   Result Value Ref Range    POC Glucose 106 (H) 70 - 99 mg/dl    Performed on ACCU-CHEK    Basic Metabolic Panel w/ Reflex to MG Collection Time: 05/07/22  6:04 AM   Result Value Ref Range    Sodium 144 135 - 144 mEq/L    Potassium reflex Magnesium 4.5 3.4 - 4.9 mEq/L    Chloride 109 (H) 95 - 107 mEq/L    CO2 25 20 - 31 mEq/L    Anion Gap 10 9 - 15 mEq/L    Glucose 92 70 - 99 mg/dL    BUN 11 8 - 23 mg/dL    CREATININE 0.86 0.50 - 0.90 mg/dL    GFR Non-African American >60.0 >60    GFR  >60.0 >60    Calcium 9.3 8.5 - 9.9 mg/dL   CBC with Auto Differential    Collection Time: 05/07/22  6:04 AM   Result Value Ref Range    WBC 8.0 4.8 - 10.8 K/uL    RBC 4.27 4.20 - 5.40 M/uL    Hemoglobin 12.2 12.0 - 16.0 g/dL    Hematocrit 36.6 (L) 37.0 - 47.0 %    MCV 85.8 82.0 - 100.0 fL    MCH 28.5 27.0 - 31.3 pg    MCHC 33.2 33.0 - 37.0 %    RDW 16.0 (H) 11.5 - 14.5 %    Platelets 043 081 - 017 K/uL    Neutrophils % 70.7 %    Lymphocytes % 18.9 %    Monocytes % 6.3 %    Eosinophils % 3.1 %    Basophils % 1.0 %    Neutrophils Absolute 5.6 1.4 - 6.5 K/uL    Lymphocytes Absolute 1.5 1.0 - 4.8 K/uL    Monocytes Absolute 0.5 0.2 - 0.8 K/uL    Eosinophils Absolute 0.3 0.0 - 0.7 K/uL    Basophils Absolute 0.1 0.0 - 0.2 K/uL   POCT Glucose    Collection Time: 05/07/22  7:54 AM   Result Value Ref Range    POC Glucose 89 70 - 99 mg/dl    Performed on ACCU-CHEK    POCT Glucose    Collection Time: 05/07/22 11:36 AM   Result Value Ref Range    POC Glucose 101 (H) 70 - 99 mg/dl    Performed on ACCU-CHEK    POCT glycosylated hemoglobin (Hb A1C)    Collection Time: 05/16/22 12:31 PM   Result Value Ref Range    Hemoglobin A1C 5.7 %   Lipid Panel    Collection Time: 05/17/22 11:01 AM   Result Value Ref Range    Cholesterol, Total 138 0 - 199 mg/dL    Triglycerides 59 0 - 150 mg/dL    HDL 50 40 - 59 mg/dL    LDL Calculated 76 0 - 129 mg/dL   Hepatitis C Antibody    Collection Time: 05/17/22 11:01 AM   Result Value Ref Range    Hepatitis C Ab NONREACTIVE NR       [] Pt was seen by provider for      Minutes  Counseling and coordination of care was done for all assessment diagnosis listed for today with patient and any family/friend present. More than 50% of this visit was spent coordinating current care, obtaining information for prior records, and counseling for current plan of action. Assessment:       Diagnosis Orders   1. Type 2 diabetes mellitus with other circulatory complication, without long-term current use of insulin (HCC)  POCT glycosylated hemoglobin (Hb A1C)     DIABETES FOOT EXAM     DIABETES EYE EXAM    Lipid Panel    Microalbumin / Creatinine Urine Ratio   2. Primary hypertension  Lipid Panel   3. Dry mouth     4. Pneumococcal vaccine administered  PNEUMOVAX 23 subcutaneous/IM (Pneumococcal polysaccharide vaccine 23-valent >= 1yo)   5. Need for hepatitis C screening test  Hepatitis C Antibody   6. Need for prophylactic vaccination against Streptococcus pneumoniae (pneumococcus)           Orders Placed This Encounter   Procedures    PNEUMOVAX 23 subcutaneous/IM (Pneumococcal polysaccharide vaccine 23-valent >= 1yo)    Hepatitis C Antibody     Standing Status:   Future     Number of Occurrences:   1     Standing Expiration Date:   5/16/2023    Lipid Panel     Standing Status:   Future     Number of Occurrences:   1     Standing Expiration Date:   5/16/2023     Order Specific Question:   Is Patient Fasting?/# of Hours     Answer:   8    Microalbumin / Creatinine Urine Ratio    POCT glycosylated hemoglobin (Hb A1C)     DIABETES FOOT EXAM     DIABETES EYE EXAM       No orders of the defined types were placed in this encounter. Medication List          Accurate as of May 16, 2022 11:59 PM. If you have any questions, ask your nurse or doctor.             CONTINUE taking these medications    albuterol (2.5 MG/3ML) 0.083% nebulizer solution  Commonly known as: PROVENTIL     amLODIPine 5 MG tablet  Commonly known as: NORVASC     aspirin 81 MG tablet     estradiol 0.1 MG/GM vaginal cream  Commonly known as: ESTRACE     fluticasone 50

## 2022-05-17 DIAGNOSIS — I10 PRIMARY HYPERTENSION: ICD-10-CM

## 2022-05-17 DIAGNOSIS — Z11.59 NEED FOR HEPATITIS C SCREENING TEST: ICD-10-CM

## 2022-05-17 DIAGNOSIS — E11.59 TYPE 2 DIABETES MELLITUS WITH OTHER CIRCULATORY COMPLICATION, WITHOUT LONG-TERM CURRENT USE OF INSULIN (HCC): ICD-10-CM

## 2022-05-17 LAB
CHOLESTEROL, TOTAL: 138 MG/DL (ref 0–199)
HDLC SERPL-MCNC: 50 MG/DL (ref 40–59)
LDL CHOLESTEROL CALCULATED: 76 MG/DL (ref 0–129)
TRIGL SERPL-MCNC: 59 MG/DL (ref 0–150)

## 2022-05-18 ENCOUNTER — OFFICE VISIT (OUTPATIENT)
Dept: FAMILY MEDICINE CLINIC | Age: 72
End: 2022-05-18
Payer: MEDICARE

## 2022-05-18 ENCOUNTER — COMMUNITY OUTREACH (OUTPATIENT)
Dept: INTERNAL MEDICINE | Age: 72
End: 2022-05-18

## 2022-05-18 VITALS
OXYGEN SATURATION: 98 % | DIASTOLIC BLOOD PRESSURE: 50 MMHG | SYSTOLIC BLOOD PRESSURE: 120 MMHG | HEIGHT: 62 IN | HEART RATE: 66 BPM | WEIGHT: 150.6 LBS | BODY MASS INDEX: 27.71 KG/M2 | TEMPERATURE: 97.8 F

## 2022-05-18 DIAGNOSIS — I10 PRIMARY HYPERTENSION: Primary | ICD-10-CM

## 2022-05-18 DIAGNOSIS — K92.2 CHRONIC GI BLEEDING: ICD-10-CM

## 2022-05-18 LAB — HEPATITIS C ANTIBODY: NONREACTIVE

## 2022-05-18 PROCEDURE — 1123F ACP DISCUSS/DSCN MKR DOCD: CPT | Performed by: FAMILY MEDICINE

## 2022-05-18 PROCEDURE — 4040F PNEUMOC VAC/ADMIN/RCVD: CPT | Performed by: FAMILY MEDICINE

## 2022-05-18 PROCEDURE — G8417 CALC BMI ABV UP PARAM F/U: HCPCS | Performed by: FAMILY MEDICINE

## 2022-05-18 PROCEDURE — G8427 DOCREV CUR MEDS BY ELIG CLIN: HCPCS | Performed by: FAMILY MEDICINE

## 2022-05-18 PROCEDURE — 3017F COLORECTAL CA SCREEN DOC REV: CPT | Performed by: FAMILY MEDICINE

## 2022-05-18 PROCEDURE — 99213 OFFICE O/P EST LOW 20 MIN: CPT | Performed by: FAMILY MEDICINE

## 2022-05-18 PROCEDURE — 1090F PRES/ABSN URINE INCON ASSESS: CPT | Performed by: FAMILY MEDICINE

## 2022-05-18 PROCEDURE — 4004F PT TOBACCO SCREEN RCVD TLK: CPT | Performed by: FAMILY MEDICINE

## 2022-05-18 PROCEDURE — G8400 PT W/DXA NO RESULTS DOC: HCPCS | Performed by: FAMILY MEDICINE

## 2022-05-18 PROCEDURE — 1111F DSCHRG MED/CURRENT MED MERGE: CPT | Performed by: FAMILY MEDICINE

## 2022-05-18 RX ORDER — UBIDECARENONE 75 MG
100 CAPSULE ORAL DAILY
Qty: 30 TABLET | Refills: 11 | Status: SHIPPED | OUTPATIENT
Start: 2022-05-18 | End: 2023-05-18

## 2022-05-18 ASSESSMENT — ENCOUNTER SYMPTOMS
PHOTOPHOBIA: 0
ABDOMINAL PAIN: 0
SHORTNESS OF BREATH: 0
ABDOMINAL DISTENTION: 0
CHEST TIGHTNESS: 0

## 2022-05-18 NOTE — PROGRESS NOTES
Diagnosis Orders   1. Primary hypertension     2. Chronic GI bleeding  vitamin B-12 (CYANOCOBALAMIN) 100 MCG tablet     Return in about 6 months (around 11/18/2022) for for review of outcome of today's recommendation. Patient Instructions   Patient will keep track of her blood pressure at home couple times a week but at varying times of day. Her top number should range between 100-145. And the bottom number can be from 50-80. This seems to be improving. Not yet proven controlled though. Due to specialty recommendation we will initiate B12 supplement orally. This is a new recommendation    Follow-up in 3 months. Subjective:      Patient ID: Rashel Mcmanus is a 74 y.o. female who presents for:  Chief Complaint   Patient presents with    Hypertension     BP check        Patient brings her blood pressure cuff in for check today. Her systolic reads pretty on spot. Her diastolic runs 10 points higher than our office cuff. we will keep that in mind with her pressures. Her home measurement  This morning before medication was 145/68    Patient states she saw specialist today and they told her she was low on B12 and she should be taking that. Current Outpatient Medications on File Prior to Visit   Medication Sig Dispense Refill    amLODIPine (NORVASC) 5 MG tablet Take by mouth      ibuprofen (ADVIL;MOTRIN) 600 MG tablet Take by mouth      metFORMIN (GLUCOPHAGE) 1000 MG tablet Take 1 tablet by mouth 2 times daily (with meals) 60 tablet 2    estradiol (ESTRACE) 0.1 MG/GM vaginal cream Vaginally.   Finger tip dose every other night      lisinopril (PRINIVIL;ZESTRIL) 10 MG tablet Take 10 mg by mouth daily      fluticasone-umeclidin-vilant (TRELEGY ELLIPTA) 100-62.5-25 MCG/INH AEPB Inhale 1 puff into the lungs daily 1 each 0    aspirin 81 MG tablet Take 81 mg by mouth daily Indications: every other day       sotalol (BETAPACE) 80 MG tablet Take 120 mg by mouth 2 times daily      albuterol (PROVENTIL) (2.5 MG/3ML) 0.083% nebulizer solution Take 2.5 mg by nebulization every 6 hours as needed for Wheezing      Mirabegron ER 50 MG TB24 Take by mouth daily      atorvastatin (LIPITOR) 80 MG tablet Take 40 mg by mouth every evening       fluticasone (FLONASE) 50 MCG/ACT nasal spray 1 spray      nitroGLYCERIN (MINITRAN) 0.4 MG/HR Place under the tongue       No current facility-administered medications on file prior to visit.      Past Medical History:   Diagnosis Date    Arthritis     CAD (coronary artery disease)     cardiac stents x 3    Cancer ([facility]     right breast    COPD (chronic obstructive pulmonary disease) ([facility])     smoker since age 10    Diabetes mellitus ([facility] [address] [facility]     hx > 30 yrs    GERD (gastroesophageal reflux disease)     History of blood transfusion 07/2019    blood transfusions x 3 due to lower GI bleed / [facility].    Hyperlipidemia     meds > 10 yrs    Hypertension     meds > 10 yrs    PVD (peripheral vascular disease) ([facility] [address]     both legs     Past Surgical History:   Procedure Laterality Date    BREAST SURGERY Right 1996    mastectomy due to malignancy / chemo to follow    CARDIAC CATHETERIZATION      COLONOSCOPY  08/25/2016    Karen Boles MD    CORONARY ANGIOPLASTY WITH STENT PLACEMENT      cardiac stents x 3    ENDOSCOPY, COLON, DIAGNOSTIC      EYE SURGERY      [place] with IOL OS    HYSTERECTOMY  1968    MYRINGOTOMY AND TYMPANOSTOMY TUBE PLACEMENT Bilateral 8/8/2019    BILATERAL MYRINGOTOMY WITH VENTILATING  TUBE INSERTION performed by Charis Stratton MD at [address]      as child    TUNNELED VENOUS PORT PLACEMENT  2015    used for Iron infusions     Social History     Socioeconomic History    Marital status:      Spouse name: Not on file    Number of children: Not on file    Years of education: Not on file    Highest education level: Not on file   Occupational History    Not on file   Tobacco Use    Smoking status: Current Some Day Smoker     Packs/day: 0.25     Years: 50.00     Pack years: 12.50     Types: Cigarettes    Smokeless tobacco: Never Used   Vaping Use    Vaping Use: Never used   Substance and Sexual Activity    Alcohol use: No    Drug use: No    Sexual activity: Not on file   Other Topics Concern    Not on file   Social History Narrative    Not on file     Social Determinants of Health     Financial Resource Strain: Medium Risk    Difficulty of Paying Living Expenses: Somewhat hard   Food Insecurity: Food Insecurity Present    Worried About Running Out of Food in the Last Year: Often true    Damián of Food in the Last Year: Often true   Transportation Needs:     Lack of Transportation (Medical): Not on file    Lack of Transportation (Non-Medical):  Not on file   Physical Activity:     Days of Exercise per Week: Not on file    Minutes of Exercise per Session: Not on file   Stress:     Feeling of Stress : Not on file   Social Connections:     Frequency of Communication with Friends and Family: Not on file    Frequency of Social Gatherings with Friends and Family: Not on file    Attends Rastafari Services: Not on file    Active Member of [address] [place] [address] or Organizations: Not on file    Attends Club or Organization Meetings: Not on file    Marital Status: Not on file   Intimate Partner Violence:     Fear of Current or Ex-Partner: Not on file    Emotionally Abused: Not on file    Physically Abused: Not on file    Sexually Abused: Not on file   Housing Stability:     Unable to Pay for Housing in the Last Year: Not on file    Number of [place] in the Last Year: Not on file    Unstable Housing in the Last Year: Not on file     Family History   Problem Relation Age of Onset    Breast Cancer Mother     Diabetes Mother     Stroke Mother     High Blood Pressure Father     Heart Disease Father     High Cholesterol Father     Diabetes Father     Stroke Father     Cancer Sister         oral cancer    Diabetes Brother     Kidney Disease Brother     Alcohol Abuse Brother         liver problems    Cancer Brother         liver & lung cancer    Other Son         PTSD / blood dyscrasia    Cancer Sister         brain & lung cancer    Other Son         MVA at age 29     Allergies:  Diltiazem    Review of Systems   Constitutional: Negative for activity change, appetite change, diaphoresis and unexpected weight change. Eyes: Negative for photophobia and visual disturbance. Respiratory: Negative for chest tightness and shortness of breath. No orthopnea   Cardiovascular: Negative for chest pain, palpitations and leg swelling. Gastrointestinal: Negative for abdominal distention and abdominal pain. Genitourinary: Negative for flank pain and frequency. Musculoskeletal: Negative for gait problem and joint swelling. Neurological: Negative for dizziness, weakness, light-headedness and headaches. Psychiatric/Behavioral: Negative for confusion. Objective:   BP (!) 120/50   Pulse 66   Temp 97.8 °F (36.6 °C)   Ht 5' 1.75\" (1.568 m)   Wt 150 lb 9.6 oz (68.3 kg)   SpO2 98%   BMI 27.77 kg/m²     Physical Exam  Constitutional:       General: She is not in acute distress. Appearance: She is well-developed. She is not diaphoretic. HENT:      Head: Normocephalic and atraumatic. Right Ear: External ear normal.      Left Ear: External ear normal.      Nose: Nose normal.   Eyes:      General:         Right eye: No discharge. Left eye: No discharge. Conjunctiva/sclera: Conjunctivae normal.      Pupils: Pupils are equal, round, and reactive to light. Neck:      Thyroid: No thyromegaly. Trachea: No tracheal deviation. Cardiovascular:      Rate and Rhythm: Normal rate and regular rhythm. Pulmonary:      Effort: Pulmonary effort is normal. No respiratory distress. Abdominal:      General: There is no distension. Musculoskeletal:      Cervical back: Neck supple.    Skin: General: Skin is warm and dry. Findings: No bruising or rash. Neurological:      Mental Status: She is alert. Coordination: Coordination normal.   Psychiatric:         Thought Content: Thought content normal.         Judgment: Judgment normal.         No results found for this visit on 05/18/22.     Recent Results (from the past 2016 hour(s))   Lipase    Collection Time: 05/05/22 12:54 PM   Result Value Ref Range    Lipase 108 (H) 12 - 95 U/L   Lactic Acid    Collection Time: 05/05/22 12:54 PM   Result Value Ref Range    Lactic Acid 1.6 0.5 - 2.2 mmol/L   CBC with Auto Differential    Collection Time: 05/05/22 12:54 PM   Result Value Ref Range    WBC 10.9 (H) 4.8 - 10.8 K/uL    RBC 4.48 4.20 - 5.40 M/uL    Hemoglobin 12.7 12.0 - 16.0 g/dL    Hematocrit 38.7 37.0 - 47.0 %    MCV 86.5 82.0 - 100.0 fL    MCH 28.3 27.0 - 31.3 pg    MCHC 32.7 (L) 33.0 - 37.0 %    RDW 16.1 (H) 11.5 - 14.5 %    Platelets 275 325 - 640 K/uL    Neutrophils % 71.8 %    Lymphocytes % 17.1 %    Monocytes % 7.6 %    Eosinophils % 2.3 %    Basophils % 1.2 %    Neutrophils Absolute 7.8 (H) 1.4 - 6.5 K/uL    Lymphocytes Absolute 1.9 1.0 - 4.8 K/uL    Monocytes Absolute 0.8 0.2 - 0.8 K/uL    Eosinophils Absolute 0.2 0.0 - 0.7 K/uL    Basophils Absolute 0.1 0.0 - 0.2 K/uL   Comprehensive Metabolic Panel    Collection Time: 05/05/22 12:54 PM   Result Value Ref Range    Sodium 134 (L) 135 - 144 mEq/L    Potassium 4.3 3.4 - 4.9 mEq/L    Chloride 100 95 - 107 mEq/L    CO2 22 20 - 31 mEq/L    Anion Gap 12 9 - 15 mEq/L    Glucose 115 (H) 70 - 99 mg/dL    BUN 23 8 - 23 mg/dL    CREATININE 1.19 (H) 0.50 - 0.90 mg/dL    GFR Non-African American 44.3 (L) >60    GFR  53.7 (L) >60    Calcium 9.0 8.5 - 9.9 mg/dL    Total Protein 6.6 6.3 - 8.0 g/dL    Albumin 3.8 3.5 - 4.6 g/dL    Total Bilirubin 0.3 0.2 - 0.7 mg/dL    Alkaline Phosphatase 94 40 - 130 U/L    ALT 11 0 - 33 U/L    AST 14 0 - 35 U/L    Globulin 2.8 2.3 - 3.5 g/dL C-Reactive Protein    Collection Time: 05/05/22 12:54 PM   Result Value Ref Range    CRP 3.9 0.0 - 5.0 mg/L   Magnesium    Collection Time: 05/05/22 12:54 PM   Result Value Ref Range    Magnesium 2.5 (H) 1.7 - 2.4 mg/dL   CK    Collection Time: 05/05/22 12:54 PM   Result Value Ref Range    Total CK 56 0 - 170 U/L   Urinalysis with Reflex to Culture    Collection Time: 05/05/22  1:00 PM    Specimen: Urine   Result Value Ref Range    Color, UA Yellow Straw/Yellow    Clarity, UA CLOUDY (A) Clear    Glucose, Ur Negative Negative mg/dL    Bilirubin Urine Negative Negative    Ketones, Urine Negative Negative mg/dL    Specific Gravity, UA 1.023 1.005 - 1.030    Blood, Urine TRACE (A) Negative    pH, UA 6.0 5.0 - 9.0    Protein, UA TRACE (A) Negative mg/dL    Urobilinogen, Urine 0.2 <2.0 E.U./dL    Nitrite, Urine POSITIVE (A) Negative    Leukocyte Esterase, Urine LARGE (A) Negative    Urine Reflex to Culture Yes    Urine Drug Screen    Collection Time: 05/05/22  1:00 PM   Result Value Ref Range    Amphetamine Screen, Urine Neg Negative <1000 ng/mL    Barbiturate Screen, Ur Neg Negative < 200 ng/mL    Benzodiazepine Screen, Urine Neg Negative < 200 ng/mL    Cannabinoid Scrn, Ur Neg Negative < 50 ng/mL    Cocaine Metabolite Screen, Urine Neg Negative < 300 ng/mL    Opiate Scrn, Ur Neg Negative < 300 ng/mL    PCP Screen, Urine Neg Negative < 25 ng/mL    Methadone Screen, Urine Neg Negative <300 ng/mL    Propoxyphene Scrn, Ur Neg Negative <300 ng/mL    Oxycodone Urine Neg Negative <100 ng/mL    Drug Screen Comment: see below    Microscopic Urinalysis    Collection Time: 05/05/22  1:00 PM   Result Value Ref Range    Bacteria, UA MANY (A) Negative /HPF    Hyaline Casts, UA 1-3 0 - 5 /HPF    WBC, UA >100 (H) 0 - 5 /HPF    RBC, UA 3-5 (A) 0 - 5 /HPF    Epithelial Cells, UA 0-2 0 - 5 /HPF   Culture, Urine    Collection Time: 05/05/22  1:00 PM    Specimen: Urine, clean catch   Result Value Ref Range    Urine Culture, Routine (A) Performed at [facility] [address]  [phone]      Organism Escherichia coli (A)     Urine Culture, Routine >[id number] CFU/ML        Susceptibility    Escherichia coli - BACTERIAL SUSCEPTIBILITY PANEL BY VADIM     ampicillin <=2 Sensitive mcg/mL     aztreonam <=1 Sensitive mcg/mL     ceFAZolin* <=4 Sensitive mcg/mL      * Cefazolin sensitivity results can be used to predict theeffectiveness of oral cephalosporins (eg. Cephalexin) inuncomplicated Urinary Tract Infections due to E. coli, K. pneumoniae,and P. mirabilis     cefTRIAXone <=1 Sensitive mcg/mL     ciprofloxacin <=0.25 Sensitive mcg/mL     Confirmatory Extended Spectrum Beta-Lactamase NEGATIVE Sensitive mcg/mL     gentamicin <=1 Sensitive mcg/mL     nitrofurantoin <=16 Sensitive mcg/mL     piperacillin-tazobactam <=4 Sensitive mcg/mL     trimethoprim-sulfamethoxazole <=20 Sensitive mcg/mL   POCT Glucose    Collection Time: 05/05/22  5:18 PM   Result Value Ref Range    POC Glucose 93 70 - 99 mg/dl    Performed on ACCU-CHEK    POCT Glucose    Collection Time: 05/05/22  7:36 PM   Result Value Ref Range    POC Glucose 142 (H) 70 - 99 mg/dl    Performed on ACCU-CHEK    Basic Metabolic Panel w/ Reflex to MG    Collection Time: 05/06/22  5:29 AM   Result Value Ref Range    Sodium 142 135 - 144 mEq/L    Potassium reflex Magnesium 4.6 3.4 - 4.9 mEq/L    Chloride 107 95 - 107 mEq/L    CO2 26 20 - 31 mEq/L    Anion Gap 9 9 - 15 mEq/L    Glucose 109 (H) 70 - 99 mg/dL    BUN 17 8 - 23 mg/dL    CREATININE 1.06 (H) 0.50 - 0.90 mg/dL    GFR Non-African American 50.7 (L) >60    GFR  >60.0 >60    Calcium 8.6 8.5 - 9.9 mg/dL   CBC with Auto Differential    Collection Time: 05/06/22  5:29 AM   Result Value Ref Range    WBC 8.5 4.8 - 10.8 K/uL    RBC 4.20 4. 20 - 5.40 M/uL    Hemoglobin 11.8 (L) 12.0 - 16.0 g/dL    Hematocrit 36.7 (L) 37.0 - 47.0 %    MCV 87.4 82.0 - 100.0 fL    MCH 28.2 27.0 - 31.3 pg    MCHC 32.3 (L) 33.0 - 37.0 %    RDW 16.4 (H) 11.5 - 14.5 %    Platelets 017 597 - 004 K/uL    Neutrophils % 67.3 %    Lymphocytes % 21.3 %    Monocytes % 6.9 %    Eosinophils % 3.6 %    Basophils % 0.9 %    Neutrophils Absolute 5.7 1.4 - 6.5 K/uL    Lymphocytes Absolute 1.8 1.0 - 4.8 K/uL    Monocytes Absolute 0.6 0.2 - 0.8 K/uL    Eosinophils Absolute 0.3 0.0 - 0.7 K/uL    Basophils Absolute 0.1 0.0 - 0.2 K/uL   POCT Glucose    Collection Time: 05/06/22  8:05 AM   Result Value Ref Range    POC Glucose 112 (H) 70 - 99 mg/dl    Performed on ACCU-CHEK    POCT Glucose    Collection Time: 05/06/22  1:31 PM   Result Value Ref Range    POC Glucose 124 (H) 70 - 99 mg/dl    Performed on ACCU-CHEK    POCT Glucose    Collection Time: 05/06/22  4:44 PM   Result Value Ref Range    POC Glucose 102 (H) 70 - 99 mg/dl    Performed on ACCU-CHEK    POCT Glucose    Collection Time: 05/06/22  9:23 PM   Result Value Ref Range    POC Glucose 106 (H) 70 - 99 mg/dl    Performed on ACCU-CHEK    Basic Metabolic Panel w/ Reflex to MG    Collection Time: 05/07/22  6:04 AM   Result Value Ref Range    Sodium 144 135 - 144 mEq/L    Potassium reflex Magnesium 4.5 3.4 - 4.9 mEq/L    Chloride 109 (H) 95 - 107 mEq/L    CO2 25 20 - 31 mEq/L    Anion Gap 10 9 - 15 mEq/L    Glucose 92 70 - 99 mg/dL    BUN 11 8 - 23 mg/dL    CREATININE 0.86 0.50 - 0.90 mg/dL    GFR Non-African American >60.0 >60    GFR  >60.0 >60    Calcium 9.3 8.5 - 9.9 mg/dL   CBC with Auto Differential    Collection Time: 05/07/22  6:04 AM   Result Value Ref Range    WBC 8.0 4.8 - 10.8 K/uL    RBC 4.27 4.20 - 5.40 M/uL    Hemoglobin 12.2 12.0 - 16.0 g/dL    Hematocrit 36.6 (L) 37.0 - 47.0 %    MCV 85.8 82.0 - 100.0 fL    MCH 28.5 27.0 - 31.3 pg    MCHC 33.2 33.0 - 37.0 %    RDW 16.0 (H) 11.5 - 14.5 %    Platelets 567 439 - 304 K/uL    Neutrophils % 70.7 %    Lymphocytes % 18.9 %    Monocytes % 6.3 %    Eosinophils % 3.1 %    Basophils % 1.0 %    Neutrophils Absolute 5.6 1.4 - 6.5 K/uL Lymphocytes Absolute 1.5 1.0 - 4.8 K/uL    Monocytes Absolute 0.5 0.2 - 0.8 K/uL    Eosinophils Absolute 0.3 0.0 - 0.7 K/uL    Basophils Absolute 0.1 0.0 - 0.2 K/uL   POCT Glucose    Collection Time: 05/07/22  7:54 AM   Result Value Ref Range    POC Glucose 89 70 - 99 mg/dl    Performed on ACCU-CHEK    POCT Glucose    Collection Time: 05/07/22 11:36 AM   Result Value Ref Range    POC Glucose 101 (H) 70 - 99 mg/dl    Performed on ACCU-CHEK    POCT glycosylated hemoglobin (Hb A1C)    Collection Time: 05/16/22 12:31 PM   Result Value Ref Range    Hemoglobin A1C 5.7 %   Lipid Panel    Collection Time: 05/17/22 11:01 AM   Result Value Ref Range    Cholesterol, Total 138 0 - 199 mg/dL    Triglycerides 59 0 - 150 mg/dL    HDL 50 40 - 59 mg/dL    LDL Calculated 76 0 - 129 mg/dL   Hepatitis C Antibody    Collection Time: 05/17/22 11:01 AM   Result Value Ref Range    Hepatitis C Ab NONREACTIVE NR       [] Pt was seen by provider for      Minutes  Counseling and coordination of care was done for all assessment diagnosis listed for today with patient and any family/friend present. More than 50% of this visit was spent coordinating current care, obtaining information for prior records, and counseling for current plan of action. Assessment:       Diagnosis Orders   1. Primary hypertension     2. Chronic GI bleeding  vitamin B-12 (CYANOCOBALAMIN) 100 MCG tablet         No orders of the defined types were placed in this encounter. Orders Placed This Encounter   Medications    vitamin B-12 (CYANOCOBALAMIN) 100 MCG tablet     Sig: Take 1 tablet by mouth daily     Dispense:  30 tablet     Refill:  11          Medication List          Accurate as of May 18, 2022  4:23 PM. If you have any questions, ask your nurse or doctor.             START taking these medications    vitamin B-12 100 MCG tablet  Commonly known as: CYANOCOBALAMIN  Take 1 tablet by mouth daily  Started by: Richi Vera MD        CONTINUE taking these medications    albuterol (2.5 MG/3ML) 0.083% nebulizer solution  Commonly known as: PROVENTIL     amLODIPine 5 MG tablet  Commonly known as: NORVASC     aspirin 81 MG tablet     estradiol 0.1 MG/GM vaginal cream  Commonly known as: ESTRACE     fluticasone 50 MCG/ACT nasal spray  Commonly known as: FLONASE     ibuprofen 600 MG tablet  Commonly known as: ADVIL;MOTRIN     Lipitor 80 MG tablet  Generic drug: atorvastatin     lisinopril 10 MG tablet  Commonly known as: PRINIVIL;ZESTRIL     metFORMIN 1000 MG tablet  Commonly known as: GLUCOPHAGE  Take 1 tablet by mouth 2 times daily (with meals)     Minitran 0.4 MG/HR  Generic drug: nitroGLYCERIN     mirabegron 50 MG Tb24  Commonly known as: MYRBETRIQ     sotalol 80 MG tablet  Commonly known as: BETAPACE     Trelegy Ellipta 100-62.5-25 MCG/INH Aepb  Generic drug: fluticasone-umeclidin-vilant  Inhale 1 puff into the lungs daily           Where to Get Your Medications      These medications were sent to [address] [place] [address]    Phone: [phone]   · vitamin B-12 100 MCG tablet           Plan:   Return in about 6 months (around 11/18/2022) for for review of outcome of today's recommendation. Patient Instructions   Patient will keep track of her blood pressure at home couple times a week but at varying times of day. Her top number should range between 100-145. And the bottom number can be from 50-80. This seems to be improving. Not yet proven controlled though. Due to specialty recommendation we will initiate B12 supplement orally. This is a new recommendation    Follow-up in 3 months. This note was partially created with the assistance of dictation. This may lead to grammatical or spelling errors. Noé Tracy M.D.

## 2022-05-18 NOTE — PROGRESS NOTES
Patient's HM shows they are overdue for [facility] and  files searched without success. No results to attach to order nor HM updated. Note added to upcoming appointment to discuss Care Gap.

## 2022-05-18 NOTE — PATIENT INSTRUCTIONS
Patient will keep track of her blood pressure at home couple times a week but at varying times of day. Her top number should range between 100-145. And the bottom number can be from 50-80. This seems to be improving. Not yet proven controlled though. Due to specialty recommendation we will initiate B12 supplement orally. This is a new recommendation    Follow-up in 3 months.

## 2022-05-18 NOTE — PROGRESS NOTES
Diagnosis Orders   1. Non-intractable vomiting without nausea, unspecified vomiting type      2. Uncontrolled hypertension      3. Acute cystitis without hematuria         Return in about 1 week (around 5/16/2022) for for review of outcome of today's recommendation. Patient Instructions   Patient will bring her home blood pressure monitor and meet with the nursing staff and confirm accuracy. Current bp uncontrolled     If the blood pressure cuff is accurate she will check her blood pressure at home a couple times a day various times and then return in 1 week to review the results to see if her blood pressures are elevated at home or just in the office visit. Complete antibiotics for urinary tract infection she is improving.

## 2022-06-04 PROBLEM — N39.0 UTI (URINARY TRACT INFECTION): Status: RESOLVED | Noted: 2022-05-05 | Resolved: 2022-06-04

## 2022-07-11 ENCOUNTER — OFFICE VISIT (OUTPATIENT)
Dept: FAMILY MEDICINE CLINIC | Age: 72
End: 2022-07-11
Payer: MEDICARE

## 2022-07-11 VITALS
BODY MASS INDEX: 27.6 KG/M2 | HEIGHT: 62 IN | TEMPERATURE: 97.8 F | DIASTOLIC BLOOD PRESSURE: 62 MMHG | HEART RATE: 75 BPM | SYSTOLIC BLOOD PRESSURE: 114 MMHG | OXYGEN SATURATION: 97 % | WEIGHT: 150 LBS

## 2022-07-11 DIAGNOSIS — I10 PRIMARY HYPERTENSION: ICD-10-CM

## 2022-07-11 DIAGNOSIS — S89.92XD INJURY OF LEFT KNEE, SUBSEQUENT ENCOUNTER: ICD-10-CM

## 2022-07-11 DIAGNOSIS — J43.9 PULMONARY EMPHYSEMA, UNSPECIFIED EMPHYSEMA TYPE (HCC): Primary | ICD-10-CM

## 2022-07-11 PROCEDURE — 3023F SPIROM DOC REV: CPT | Performed by: FAMILY MEDICINE

## 2022-07-11 PROCEDURE — 99214 OFFICE O/P EST MOD 30 MIN: CPT | Performed by: FAMILY MEDICINE

## 2022-07-11 PROCEDURE — G8427 DOCREV CUR MEDS BY ELIG CLIN: HCPCS | Performed by: FAMILY MEDICINE

## 2022-07-11 PROCEDURE — G8417 CALC BMI ABV UP PARAM F/U: HCPCS | Performed by: FAMILY MEDICINE

## 2022-07-11 PROCEDURE — 1090F PRES/ABSN URINE INCON ASSESS: CPT | Performed by: FAMILY MEDICINE

## 2022-07-11 PROCEDURE — 3017F COLORECTAL CA SCREEN DOC REV: CPT | Performed by: FAMILY MEDICINE

## 2022-07-11 PROCEDURE — 4004F PT TOBACCO SCREEN RCVD TLK: CPT | Performed by: FAMILY MEDICINE

## 2022-07-11 PROCEDURE — 1123F ACP DISCUSS/DSCN MKR DOCD: CPT | Performed by: FAMILY MEDICINE

## 2022-07-11 PROCEDURE — G8400 PT W/DXA NO RESULTS DOC: HCPCS | Performed by: FAMILY MEDICINE

## 2022-07-11 ASSESSMENT — ENCOUNTER SYMPTOMS
PHOTOPHOBIA: 0
ABDOMINAL DISTENTION: 0
CHEST TIGHTNESS: 0
SHORTNESS OF BREATH: 0
ABDOMINAL PAIN: 0

## 2022-07-11 NOTE — PROGRESS NOTES
your medicines exactly as prescribed. Call your doctor if you think you are having a problem with your medicine. You may be taking medicines such as:  ? Bronchodilators. These help open your airways and make breathing easier. ? Corticosteroids. These reduce airway inflammation. They may be given as pills, in a vein, or in an inhaled form. You may go home with pills in addition to an inhaler that you already use.  A spacer may help you get more inhaled medicine to your lungs. Ask your doctor or pharmacist if a spacer is right for you. If it is, ask how to use it properly.  If your doctor prescribed antibiotics, take them as directed. Do not stop taking them just because you feel better. You need to take the full course of antibiotics.  If your doctor prescribed oxygen, use the flow rate your doctor has recommended. Do not change it without talking to your doctor first.  [facility] Do not smoke. Smoking makes COPD worse. If you need help quitting, talk to your doctor about stop-smoking programs and medicines. These can increase your chances of quitting for good. When should you call for help? Call 911 anytime you think you may need emergency care. For example, call if:     You have severe trouble breathing. Call your doctor now or seek immediate medical care if:     You have new or worse trouble breathing.      Your coughing or wheezing gets worse.      You cough up dark brown or bloody mucus (sputum).      You have a new or higher fever.      You have severe chest pain, or chest pain is quickly getting worse. Watch closely for changes in your health, and be sure to contact your doctor if:     You notice more mucus or a change in the color of your mucus.      You need to use your antibiotic or steroid pills.      You do not get better as expected. Where can you learn more? Go to https://natasha.SwiftPayMD(TM) by [employer]. org and sign in to your Trak account.  Enter P847 in the ADmantX box to learn more about \"Chronic Obstructive Pulmonary Disease (COPD) Flare-Ups: Care Instructions. \"     If you do not have an account, please click on the \"Sign Up Now\" link. Current as of: March 9, 2022               Content Version: 13.3  © 9856-2361 Healthwise, Incorporated. Care instructions adapted under license by [facility] ([facility]). If you have questions about a medical condition or this instruction, always ask your healthcare professional. [address] any warranty or liability for your use of this information. Subjective:      Patient ID: Osmar Scott is a 74 y.o. female who presents for:  Chief Complaint   Patient presents with    Hypertension    Diabetes     5/16/2022 Hemoglobin A1C % 5.7         Patient denies any cardiovascular symptoms. See review of systems. Patient states she knows she is having difficulty with her breathing right now. She lives in a household of smokers. She is still smoking a couple cigarettes a day. She would like to use her nebulizer machine more often but it takes about an hour to do the treatment the machine is old and not working well. Diabetes appointment is up-to-date. Recent fall took her to the emergency room. She lost her balance fell and hurt her knee crack and then went to the emergency room. She has appointment follow-up set up with orthopedics to address this further and is currently wearing a brace      Current Outpatient Medications on File Prior to Visit   Medication Sig Dispense Refill    vitamin B-12 (CYANOCOBALAMIN) 100 MCG tablet Take 1 tablet by mouth daily 30 tablet 11    amLODIPine (NORVASC) 5 MG tablet Take by mouth      ibuprofen (ADVIL;MOTRIN) 600 MG tablet Take by mouth      metFORMIN (GLUCOPHAGE) 1000 MG tablet Take 1 tablet by mouth 2 times daily (with meals) 60 tablet 2    estradiol (ESTRACE) 0.1 MG/GM vaginal cream Vaginally.   Finger tip dose every other night      lisinopril (PRINIVIL;ZESTRIL) 10 MG tablet Take 10 mg by mouth daily      fluticasone-umeclidin-vilant (TRELEGY ELLIPTA) 100-62.5-25 MCG/INH AEPB Inhale 1 puff into the lungs daily 1 each 0    aspirin 81 MG tablet Take 81 mg by mouth daily Indications: every other day       sotalol (BETAPACE) 80 MG tablet Take 120 mg by mouth 2 times daily      albuterol (PROVENTIL) (2.5 MG/3ML) 0.083% nebulizer solution Take 2.5 mg by nebulization every 6 hours as needed for Wheezing      Mirabegron ER 50 MG TB24 Take by mouth daily      atorvastatin (LIPITOR) 80 MG tablet Take 40 mg by mouth every evening       fluticasone (FLONASE) 50 MCG/ACT nasal spray 1 spray      nitroGLYCERIN (MINITRAN) 0.4 MG/HR Place under the tongue       No current facility-administered medications on file prior to visit.      Past Medical History:   Diagnosis Date    Arthritis     CAD (coronary artery disease)     cardiac stents x 3    Cancer ([facility] [address]     right breast    COPD (chronic obstructive pulmonary disease) ([facility]     smoker since age 10    Diabetes mellitus ([facility] [address]     hx > 30 yrs    GERD (gastroesophageal reflux disease)     History of blood transfusion 07/2019    blood transfusions x 3 due to lower GI bleed / [facility].    Hyperlipidemia     meds > 10 yrs    Hypertension     meds > 10 yrs    PVD (peripheral vascular disease) ([facility] [address]     both legs     Past Surgical History:   Procedure Laterality Date    BREAST SURGERY Right 1996    mastectomy due to malignancy / chemo to follow    CARDIAC CATHETERIZATION      COLONOSCOPY  08/25/2016    Blessing Jacob MD    CORONARY ANGIOPLASTY WITH STENT PLACEMENT      cardiac stents x 3    ENDOSCOPY, COLON, DIAGNOSTIC      EYE SURGERY      [place] with IOL OS    HYSTERECTOMY (CERVIX STATUS UNKNOWN)  1968    MYRINGOTOMY AND TYMPANOSTOMY TUBE PLACEMENT Bilateral 8/8/2019    BILATERAL MYRINGOTOMY WITH VENTILATING  TUBE INSERTION performed by Campbell Chacon MD at [address] [place] [address]      as child    TUNNELED VENOUS PORT  Unstable Housing in the Last Year: Not on file     Family History   Problem Relation Age of Onset    Breast Cancer Mother     Diabetes Mother     Stroke Mother     High Blood Pressure Father     Heart Disease Father     High Cholesterol Father     Diabetes Father     Stroke Father     Cancer Sister         oral cancer    Diabetes Brother     Kidney Disease Brother     Alcohol Abuse Brother         liver problems    Cancer Brother         liver & lung cancer    Other Son         PTSD / blood dyscrasia    Cancer Sister         brain & lung cancer    Other Son         MVA at age 29     Allergies:  Diltiazem    Review of Systems   Constitutional: Negative for activity change, appetite change, chills, diaphoresis, fever and unexpected weight change. Eyes: Negative for photophobia and visual disturbance. Respiratory: Negative for chest tightness and shortness of breath. No orthopnea   Cardiovascular: Negative for chest pain, palpitations and leg swelling. Gastrointestinal: Negative for abdominal distention and abdominal pain. Genitourinary: Negative for flank pain and frequency. Musculoskeletal: Negative for gait problem and joint swelling. Neurological: Negative for dizziness, syncope, weakness, light-headedness and headaches. Psychiatric/Behavioral: Negative for confusion. Objective:   /62   Pulse 75   Temp 97.8 °F (36.6 °C)   Ht 5' 1.75\" (1.568 m)   Wt 150 lb (68 kg)   SpO2 97%   BMI 27.66 kg/m²     Physical Exam  Constitutional:       General: She is not in acute distress. Appearance: She is well-developed. She is not diaphoretic. HENT:      Head: Normocephalic and atraumatic. Right Ear: External ear normal.      Left Ear: External ear normal.      Nose: Nose normal.   Eyes:      General:         Right eye: No discharge. Left eye: No discharge.       Conjunctiva/sclera: Conjunctivae normal.      Pupils: Pupils are equal, round, and reactive to light. Neck:      Thyroid: No thyromegaly. Trachea: No tracheal deviation. Cardiovascular:      Rate and Rhythm: Normal rate and regular rhythm. Heart sounds: Normal heart sounds. Pulmonary:      Effort: Pulmonary effort is normal. No respiratory distress. Breath sounds: Wheezing present. Abdominal:      General: There is no distension. Musculoskeletal:      Cervical back: Neck supple. Skin:     General: Skin is warm and dry. Findings: No bruising or rash. Neurological:      Mental Status: She is alert. Coordination: Coordination normal.   Psychiatric:         Thought Content: Thought content normal.         Judgment: Judgment normal.         No results found for this visit on 07/11/22.     Recent Results (from the past 2016 hour(s))   Lipase    Collection Time: 05/05/22 12:54 PM   Result Value Ref Range    Lipase 108 (H) 12 - 95 U/L   Lactic Acid    Collection Time: 05/05/22 12:54 PM   Result Value Ref Range    Lactic Acid 1.6 0.5 - 2.2 mmol/L   CBC with Auto Differential    Collection Time: 05/05/22 12:54 PM   Result Value Ref Range    WBC 10.9 (H) 4.8 - 10.8 K/uL    RBC 4.48 4.20 - 5.40 M/uL    Hemoglobin 12.7 12.0 - 16.0 g/dL    Hematocrit 38.7 37.0 - 47.0 %    MCV 86.5 82.0 - 100.0 fL    MCH 28.3 27.0 - 31.3 pg    MCHC 32.7 (L) 33.0 - 37.0 %    RDW 16.1 (H) 11.5 - 14.5 %    Platelets 231 334 - 978 K/uL    Neutrophils % 71.8 %    Lymphocytes % 17.1 %    Monocytes % 7.6 %    Eosinophils % 2.3 %    Basophils % 1.2 %    Neutrophils Absolute 7.8 (H) 1.4 - 6.5 K/uL    Lymphocytes Absolute 1.9 1.0 - 4.8 K/uL    Monocytes Absolute 0.8 0.2 - 0.8 K/uL    Eosinophils Absolute 0.2 0.0 - 0.7 K/uL    Basophils Absolute 0.1 0.0 - 0.2 K/uL   Comprehensive Metabolic Panel    Collection Time: 05/05/22 12:54 PM   Result Value Ref Range    Sodium 134 (L) 135 - 144 mEq/L    Potassium 4.3 3.4 - 4.9 mEq/L    Chloride 100 95 - 107 mEq/L    CO2 22 20 - 31 mEq/L    Anion Gap 12 9 - 15 mEq/L Glucose 115 (H) 70 - 99 mg/dL    BUN 23 8 - 23 mg/dL    CREATININE 1.19 (H) 0.50 - 0.90 mg/dL    GFR Non-African American 44.3 (L) >60    GFR  53.7 (L) >60    Calcium 9.0 8.5 - 9.9 mg/dL    Total Protein 6.6 6.3 - 8.0 g/dL    Albumin 3.8 3.5 - 4.6 g/dL    Total Bilirubin 0.3 0.2 - 0.7 mg/dL    Alkaline Phosphatase 94 40 - 130 U/L    ALT 11 0 - 33 U/L    AST 14 0 - 35 U/L    Globulin 2.8 2.3 - 3.5 g/dL   C-Reactive Protein    Collection Time: 05/05/22 12:54 PM   Result Value Ref Range    CRP 3.9 0.0 - 5.0 mg/L   Magnesium    Collection Time: 05/05/22 12:54 PM   Result Value Ref Range    Magnesium 2.5 (H) 1.7 - 2.4 mg/dL   CK    Collection Time: 05/05/22 12:54 PM   Result Value Ref Range    Total CK 56 0 - 170 U/L   Urinalysis with Reflex to Culture    Collection Time: 05/05/22  1:00 PM    Specimen: Urine   Result Value Ref Range    Color, UA Yellow Straw/Yellow    Clarity, UA CLOUDY (A) Clear    Glucose, Ur Negative Negative mg/dL    Bilirubin Urine Negative Negative    Ketones, Urine Negative Negative mg/dL    Specific Gravity, UA 1.023 1.005 - 1.030    Blood, Urine TRACE (A) Negative    pH, UA 6.0 5.0 - 9.0    Protein, UA TRACE (A) Negative mg/dL    Urobilinogen, Urine 0.2 <2.0 E.U./dL    Nitrite, Urine POSITIVE (A) Negative    Leukocyte Esterase, Urine LARGE (A) Negative    Urine Reflex to Culture Yes    Urine Drug Screen    Collection Time: 05/05/22  1:00 PM   Result Value Ref Range    Amphetamine Screen, Urine Neg Negative <1000 ng/mL    Barbiturate Screen, Ur Neg Negative < 200 ng/mL    Benzodiazepine Screen, Urine Neg Negative < 200 ng/mL    Cannabinoid Scrn, Ur Neg Negative < 50 ng/mL    Cocaine Metabolite Screen, Urine Neg Negative < 300 ng/mL    Opiate Scrn, Ur Neg Negative < 300 ng/mL    PCP Screen, Urine Neg Negative < 25 ng/mL    Methadone Screen, Urine Neg Negative <300 ng/mL    Propoxyphene Scrn, Ur Neg Negative <300 ng/mL    Oxycodone Urine Neg Negative <100 ng/mL    Drug Screen Comment: see below    Microscopic Urinalysis    Collection Time: 05/05/22  1:00 PM   Result Value Ref Range    Bacteria, UA MANY (A) Negative /HPF    Hyaline Casts, UA 1-3 0 - 5 /HPF    WBC, UA >100 (H) 0 - 5 /HPF    RBC, UA 3-5 (A) 0 - 5 /HPF    Epithelial Cells, UA 0-2 0 - 5 /HPF   Culture, Urine    Collection Time: 05/05/22  1:00 PM    Specimen: Urine, clean catch   Result Value Ref Range    Urine Culture, Routine (A)      Performed at [facility] [address]  [phone]      Organism Escherichia coli (A)     Urine Culture, Routine >[id number] CFU/ML        Susceptibility    Escherichia coli - BACTERIAL SUSCEPTIBILITY PANEL BY VADIM     ampicillin <=2 Sensitive mcg/mL     aztreonam <=1 Sensitive mcg/mL     ceFAZolin* <=4 Sensitive mcg/mL      * Cefazolin sensitivity results can be used to predict theeffectiveness of oral cephalosporins (eg.  Cephalexin) inuncomplicated Urinary Tract Infections due to E. coli, K. pneumoniae,and P. mirabilis     cefTRIAXone <=1 Sensitive mcg/mL     ciprofloxacin <=0.25 Sensitive mcg/mL     Confirmatory Extended Spectrum Beta-Lactamase NEGATIVE Sensitive mcg/mL     gentamicin <=1 Sensitive mcg/mL     nitrofurantoin <=16 Sensitive mcg/mL     piperacillin-tazobactam <=4 Sensitive mcg/mL     trimethoprim-sulfamethoxazole <=20 Sensitive mcg/mL   POCT Glucose    Collection Time: 05/05/22  5:18 PM   Result Value Ref Range    POC Glucose 93 70 - 99 mg/dl    Performed on ACCU-CHEK    POCT Glucose    Collection Time: 05/05/22  7:36 PM   Result Value Ref Range    POC Glucose 142 (H) 70 - 99 mg/dl    Performed on ACCU-CHEK    Basic Metabolic Panel w/ Reflex to MG    Collection Time: 05/06/22  5:29 AM   Result Value Ref Range    Sodium 142 135 - 144 mEq/L    Potassium reflex Magnesium 4.6 3.4 - 4.9 mEq/L    Chloride 107 95 - 107 mEq/L    CO2 26 20 - 31 mEq/L    Anion Gap 9 9 - 15 mEq/L    Glucose 109 (H) 70 - 99 mg/dL    BUN 17 8 - 23 mg/dL    CREATININE 1.06 (H) 0.50 - 4.8 - 10.8 K/uL    RBC 4.27 4.20 - 5.40 M/uL    Hemoglobin 12.2 12.0 - 16.0 g/dL    Hematocrit 36.6 (L) 37.0 - 47.0 %    MCV 85.8 82.0 - 100.0 fL    MCH 28.5 27.0 - 31.3 pg    MCHC 33.2 33.0 - 37.0 %    RDW 16.0 (H) 11.5 - 14.5 %    Platelets 262 061 - 941 K/uL    Neutrophils % 70.7 %    Lymphocytes % 18.9 %    Monocytes % 6.3 %    Eosinophils % 3.1 %    Basophils % 1.0 %    Neutrophils Absolute 5.6 1.4 - 6.5 K/uL    Lymphocytes Absolute 1.5 1.0 - 4.8 K/uL    Monocytes Absolute 0.5 0.2 - 0.8 K/uL    Eosinophils Absolute 0.3 0.0 - 0.7 K/uL    Basophils Absolute 0.1 0.0 - 0.2 K/uL   POCT Glucose    Collection Time: 05/07/22  7:54 AM   Result Value Ref Range    POC Glucose 89 70 - 99 mg/dl    Performed on ACCU-CHEK    POCT Glucose    Collection Time: 05/07/22 11:36 AM   Result Value Ref Range    POC Glucose 101 (H) 70 - 99 mg/dl    Performed on ACCU-CHEK    POCT glycosylated hemoglobin (Hb A1C)    Collection Time: 05/16/22 12:31 PM   Result Value Ref Range    Hemoglobin A1C 5.7 %   Lipid Panel    Collection Time: 05/17/22 11:01 AM   Result Value Ref Range    Cholesterol, Total 138 0 - 199 mg/dL    Triglycerides 59 0 - 150 mg/dL    HDL 50 40 - 59 mg/dL    LDL Calculated 76 0 - 129 mg/dL   Hepatitis C Antibody    Collection Time: 05/17/22 11:01 AM   Result Value Ref Range    Hepatitis C Ab NONREACTIVE NR       [] Pt was seen by provider for      Minutes  Counseling and coordination of care was done for all assessment diagnosis listed for today with patient and any family/friend present. More than 50% of this visit was spent coordinating current care, obtaining information for prior records, and counseling for current plan of action. Assessment:       Diagnosis Orders   1. Pulmonary emphysema, unspecified emphysema type ([facility] [address]  Nebulizers (COMPRESSOR/NEBULIZER) MISC   2. Injury of left knee, subsequent encounter     3.  Primary hypertension           No orders of the defined types were placed in this encounter. Orders Placed This Encounter   Medications    Nebulizers (COMPRESSOR/NEBULIZER) MISC     Si inhalation by Does not apply route 4 times daily as needed (copd exac)     Dispense:  1 each     Refill:  3          Medication List          Accurate as of 2022 10:22 AM. If you have any questions, ask your nurse or doctor. START taking these medications    Compressor/Nebulizer Misc  1 inhalation by Does not apply route 4 times daily as needed (copd exac)  Started by: Jennifer Morton MD        CONTINUE taking these medications    albuterol (2.5 MG/3ML) 0.083% nebulizer solution  Commonly known as: PROVENTIL     amLODIPine 5 MG tablet  Commonly known as: NORVASC     aspirin 81 MG tablet     estradiol 0.1 MG/GM vaginal cream  Commonly known as: ESTRACE     fluticasone 50 MCG/ACT nasal spray  Commonly known as: FLONASE     ibuprofen 600 MG tablet  Commonly known as: ADVIL;MOTRIN     Lipitor 80 MG tablet  Generic drug: atorvastatin     lisinopril 10 MG tablet  Commonly known as: PRINIVIL;ZESTRIL     metFORMIN 1000 MG tablet  Commonly known as: GLUCOPHAGE  Take 1 tablet by mouth 2 times daily (with meals)     Minitran 0.4 MG/HR  Generic drug: nitroGLYCERIN     mirabegron 50 MG Tb24  Commonly known as: MYRBETRIQ     sotalol 80 MG tablet  Commonly known as: BETAPACE     Trelegy Ellipta 100-62.5-25 MCG/INH Aepb  Generic drug: fluticasone-umeclidin-vilant  Inhale 1 puff into the lungs daily     vitamin B-12 100 MCG tablet  Commonly known as: CYANOCOBALAMIN  Take 1 tablet by mouth daily           Where to Get Your Medications      You can get these medications from any pharmacy    Bring a paper prescription for each of these medications  · Compressor/Nebulizer Misc           Plan:   Return for keep next planned appointment. Patient Instructions     Current copd exac. Pt will use nebulizer machine 3-4 times daily, new machine provided.     Keep diabetes f/u appt in August    Referral already created by ER for Ortho for her knee    No change in bp meds. stable  Patient Education        Chronic Obstructive Pulmonary Disease (COPD) Flare-Ups: Care Instructions  Overview     Chronic obstructive pulmonary disease (COPD) is a lung disease that makes it hard to breathe. It is caused by damage to the lungs over many years, usuallyfrom smoking. Chronic bronchitis and emphysema are two lung problems that are types of COPD:   Chronic bronchitis: The airways that carry air to the lungs (bronchial tubes) get inflamed and make a lot of mucus. This can narrow or block the airways. It can also make you cough.  Emphysema: The tiny air sacs (alveoli) at the end of the airways in the lungs are damaged. When the air sacs are damaged or destroyed, the inner walls break down, and the sacs become larger. These larger air sacs move less oxygen into the blood. This causes difficulty breathing or shortness of breath that gets worse over time. After air sacs are destroyed, they cannot be replaced. Many people with COPD have attacks called flare-ups or exacerbations. This iswhen your usual symptoms quickly get worse and stay worse. If you notice any problems or new symptoms, get medical treatment right away. Follow-up care is a key part of your treatment and safety. Be sure to make and go to all appointments, and call your doctor if you are having problems. It's also a good idea to know your test results and keep alist of the medicines you take. How can you care for yourself at home?  Be safe with medicines. Take your medicines exactly as prescribed. Call your doctor if you think you are having a problem with your medicine. You may be taking medicines such as:  ? Bronchodilators. These help open your airways and make breathing easier. ? Corticosteroids. These reduce airway inflammation. They may be given as pills, in a vein, or in an inhaled form.  You may go home with pills in addition to an inhaler that you already use.  A spacer may help you get more inhaled medicine to your lungs. Ask your doctor or pharmacist if a spacer is right for you. If it is, ask how to use it properly.  If your doctor prescribed antibiotics, take them as directed. Do not stop taking them just because you feel better. You need to take the full course of antibiotics.  If your doctor prescribed oxygen, use the flow rate your doctor has recommended. Do not change it without talking to your doctor first.  Toya Cta Do not smoke. Smoking makes COPD worse. If you need help quitting, talk to your doctor about stop-smoking programs and medicines. These can increase your chances of quitting for good. When should you call for help? Call 911 anytime you think you may need emergency care. For example, call if:     You have severe trouble breathing. Call your doctor now or seek immediate medical care if:     You have new or worse trouble breathing.      Your coughing or wheezing gets worse.      You cough up dark brown or bloody mucus (sputum).      You have a new or higher fever.      You have severe chest pain, or chest pain is quickly getting worse. Watch closely for changes in your health, and be sure to contact your doctor if:     You notice more mucus or a change in the color of your mucus.      You need to use your antibiotic or steroid pills.      You do not get better as expected. Where can you learn more? Go to [email or web address]://[email or web address] [email or web address] Information. org and sign in to your Timescape account. Enter I051 in the [facility] box to learn more about \"Chronic Obstructive Pulmonary Disease (COPD) Flare-Ups: Care Instructions. \"     If you do not have an account, please click on the \"Sign Up Now\" link. Current as of: March 9, 2022               Content Version: 13.3  © 5454-2033 Healthwise, Incorporated. Care instructions adapted under license by [facility] ([facility]).  If you have questions about a medical condition or this instruction, always ask your healthcare professional. [place] [address] any warranty or liability for your use of this information. This note was partially created with the assistance of dictation. This may lead to grammatical or spelling errors. Noé Reed M.D.

## 2022-07-11 NOTE — PROGRESS NOTES
Diagnosis Orders   1. Primary hypertension      2. Chronic GI bleeding  vitamin B-12 (CYANOCOBALAMIN) 100 MCG tablet      Return in about 6 months (around 11/18/2022) for for review of outcome of today's recommendation. Patient Instructions   Patient will keep track of her blood pressure at home couple times a week but at varying times of day. Her top number should range between 100-145. And the bottom number can be from 50-80. This seems to be improving. Not yet proven controlled though. Due to specialty recommendation we will initiate B12 supplement orally. This is a new recommendation     Follow-up in 3 months. Diagnosis Orders   1. Type 2 diabetes mellitus with other circulatory complication, without long-term current use of insulin (HCC)  POCT glycosylated hemoglobin (Hb A1C)     HM DIABETES FOOT EXAM     HM DIABETES EYE EXAM     Lipid Panel     Microalbumin / Creatinine Urine Ratio   2. Primary hypertension  Lipid Panel   3. Dry mouth      4. Pneumococcal vaccine administered  PNEUMOVAX 23 subcutaneous/IM (Pneumococcal polysaccharide vaccine 23-valent >= 3yo)   5. Need for hepatitis C screening test  Hepatitis C Antibody   6. Need for prophylactic vaccination against Streptococcus pneumoniae (pneumococcus)         Return in about 3 months (around 8/16/2022) for for review of outcome of today's recommendation. Patient Instructions   Patient will consider utilizing dry mouth rinses and mouthwashes more frequently around through her day. Patient can consider getting chewing gum or candies that contain xylitol to help with mouth moisture. No change in blood pressure medication at this time. Diabetes under good control. Patient may return for nurse visit with new blood pressure cuff when she is ready.   Recommend drug [place] brand arm cuff

## 2022-07-11 NOTE — PATIENT INSTRUCTIONS
Incorporated. Care instructions adapted under license by [facility] ([facility]). If you have questions about a medical condition or this instruction, always ask your healthcare professional. [address] any warranty or liability for your use of this information.

## 2022-11-21 ENCOUNTER — OFFICE VISIT (OUTPATIENT)
Dept: FAMILY MEDICINE CLINIC | Age: 72
End: 2022-11-21
Payer: MEDICARE

## 2022-11-21 VITALS
DIASTOLIC BLOOD PRESSURE: 64 MMHG | SYSTOLIC BLOOD PRESSURE: 124 MMHG | BODY MASS INDEX: 28.08 KG/M2 | OXYGEN SATURATION: 95 % | HEART RATE: 87 BPM | WEIGHT: 152.6 LBS | TEMPERATURE: 96.9 F | HEIGHT: 62 IN

## 2022-11-21 DIAGNOSIS — Z23 FLU VACCINE NEED: ICD-10-CM

## 2022-11-21 DIAGNOSIS — E11.59 TYPE 2 DIABETES MELLITUS WITH OTHER CIRCULATORY COMPLICATION, WITHOUT LONG-TERM CURRENT USE OF INSULIN (HCC): Primary | ICD-10-CM

## 2022-11-21 DIAGNOSIS — I65.22 STENOSIS OF LEFT CAROTID ARTERY: ICD-10-CM

## 2022-11-21 DIAGNOSIS — I25.10 CORONARY ARTERY DISEASE INVOLVING NATIVE HEART WITHOUT ANGINA PECTORIS, UNSPECIFIED VESSEL OR LESION TYPE: ICD-10-CM

## 2022-11-21 DIAGNOSIS — J43.9 PULMONARY EMPHYSEMA, UNSPECIFIED EMPHYSEMA TYPE (HCC): ICD-10-CM

## 2022-11-21 LAB — HBA1C MFR BLD: 6 %

## 2022-11-21 PROCEDURE — 1090F PRES/ABSN URINE INCON ASSESS: CPT | Performed by: FAMILY MEDICINE

## 2022-11-21 PROCEDURE — G8484 FLU IMMUNIZE NO ADMIN: HCPCS | Performed by: FAMILY MEDICINE

## 2022-11-21 PROCEDURE — 90694 VACC AIIV4 NO PRSRV 0.5ML IM: CPT | Performed by: FAMILY MEDICINE

## 2022-11-21 PROCEDURE — 3074F SYST BP LT 130 MM HG: CPT | Performed by: FAMILY MEDICINE

## 2022-11-21 PROCEDURE — 3044F HG A1C LEVEL LT 7.0%: CPT | Performed by: FAMILY MEDICINE

## 2022-11-21 PROCEDURE — 3023F SPIROM DOC REV: CPT | Performed by: FAMILY MEDICINE

## 2022-11-21 PROCEDURE — G0008 ADMIN INFLUENZA VIRUS VAC: HCPCS | Performed by: FAMILY MEDICINE

## 2022-11-21 PROCEDURE — 1123F ACP DISCUSS/DSCN MKR DOCD: CPT | Performed by: FAMILY MEDICINE

## 2022-11-21 PROCEDURE — 2022F DILAT RTA XM EVC RTNOPTHY: CPT | Performed by: FAMILY MEDICINE

## 2022-11-21 PROCEDURE — 4004F PT TOBACCO SCREEN RCVD TLK: CPT | Performed by: FAMILY MEDICINE

## 2022-11-21 PROCEDURE — G8400 PT W/DXA NO RESULTS DOC: HCPCS | Performed by: FAMILY MEDICINE

## 2022-11-21 PROCEDURE — 3017F COLORECTAL CA SCREEN DOC REV: CPT | Performed by: FAMILY MEDICINE

## 2022-11-21 PROCEDURE — G8417 CALC BMI ABV UP PARAM F/U: HCPCS | Performed by: FAMILY MEDICINE

## 2022-11-21 PROCEDURE — G8427 DOCREV CUR MEDS BY ELIG CLIN: HCPCS | Performed by: FAMILY MEDICINE

## 2022-11-21 PROCEDURE — 83036 HEMOGLOBIN GLYCOSYLATED A1C: CPT | Performed by: FAMILY MEDICINE

## 2022-11-21 PROCEDURE — 99214 OFFICE O/P EST MOD 30 MIN: CPT | Performed by: FAMILY MEDICINE

## 2022-11-21 PROCEDURE — 3078F DIAST BP <80 MM HG: CPT | Performed by: FAMILY MEDICINE

## 2022-11-21 ASSESSMENT — ENCOUNTER SYMPTOMS
PHOTOPHOBIA: 0
CHEST TIGHTNESS: 0
ABDOMINAL DISTENTION: 0
ABDOMINAL PAIN: 0
SHORTNESS OF BREATH: 0

## 2022-11-21 NOTE — PROGRESS NOTES
1. Primary hypertension      2. Chronic GI bleeding  vitamin B-12 (CYANOCOBALAMIN) 100 MCG tablet      Return in about 6 months (around 11/18/2022) for for review of outcome of today's recommendation. Patient Instructions   Patient will keep track of her blood pressure at home couple times a week but at varying times of day. Her top number should range between 100-145. And the bottom number can be from 50-80. This seems to be improving. Not yet proven controlled though. Due to specialty recommendation we will initiate B12 supplement orally. This is a new recommendation     Follow-up in 3 months. After obtaining consent, and per orders of Dr. Adali Norwood, injection of hi dose flu given in Left deltoid by John Bernardo MA. Patient instructed to remain in clinic for 20 minutes afterwards, and to report any adverse reaction to me immediately. Vaccine Information Sheet, \"Influenza - Inactivated\"  given to Robb Rank, or parent/legal guardian of  Robb Rank and verbalized understanding. Patient responses:    Have you ever had a reaction to a flu vaccine? No  Are you able to eat eggs without adverse effects? Yes  Do you have any current illness? No  Have you ever had Guillian Floriston Syndrome? No    Flu vaccine given per order. Please see immunization tab.

## 2022-11-21 NOTE — PROGRESS NOTES
Diagnosis Orders   1. Type 2 diabetes mellitus with other circulatory complication, without long-term current use of insulin ([facility])  POCT glycosylated hemoglobin (Hb A1C)      2. Flu vaccine need  Influenza, FLUAD, (age 72 y+), IM, PF, 0.5 mL      3. Pulmonary emphysema, unspecified emphysema type ([facility] [address]        4. Stenosis of left carotid artery        5. Coronary artery disease involving native heart without angina pectoris, unspecified vessel or lesion type          Return for maw due jan 2023,  6 month appt med. condition. Patient Instructions   MAW due in January    Keeping up with specialist- pulmonary and cardiac    No med changes, discussed adding evening snack due to increasing HbA1c. Subjective:      Patient ID: Osito Ruff is a 74 y.o. female who presents for:  Chief Complaint   Patient presents with    Health Maintenance     Address [facility] x 3 months   Printed mammo for patient        Pt denies symptoms of DM or CVD. See ROS    Reviewed specialty appointment notes and labs with patient. Questions answered    Home bp 120s/50s      Current Outpatient Medications on File Prior to Visit   Medication Sig Dispense Refill    metFORMIN (GLUCOPHAGE) 1000 MG tablet Take 1 tablet by mouth 2 times daily (with meals) 180 tablet 0    Nebulizers (COMPRESSOR/NEBULIZER) MISC 1 inhalation by Does not apply route 4 times daily as needed (copd exac) 1 each 3    vitamin B-12 (CYANOCOBALAMIN) 100 MCG tablet Take 1 tablet by mouth daily 30 tablet 11    amLODIPine (NORVASC) 5 MG tablet Take by mouth      ibuprofen (ADVIL;MOTRIN) 600 MG tablet Take by mouth      estradiol (ESTRACE) 0.1 MG/GM vaginal cream Vaginally.   Finger tip dose every other night      lisinopril (PRINIVIL;ZESTRIL) 10 MG tablet Take 10 mg by mouth daily      fluticasone-umeclidin-vilant (TRELEGY ELLIPTA) 100-62.5-25 MCG/INH AEPB Inhale 1 puff into the lungs daily 1 each 0    aspirin 81 MG tablet Take 81 mg by mouth daily Indications: every other day sotalol (BETAPACE) 80 MG tablet Take 120 mg by mouth 2 times daily      albuterol (PROVENTIL) (2.5 MG/3ML) 0.083% nebulizer solution Take 2.5 mg by nebulization every 6 hours as needed for Wheezing      Mirabegron ER 50 MG TB24 Take by mouth daily      atorvastatin (LIPITOR) 80 MG tablet Take 40 mg by mouth every evening       fluticasone (FLONASE) 50 MCG/ACT nasal spray 1 spray      nitroGLYCERIN (NITRODUR) 0.4 MG/HR Place under the tongue       No current facility-administered medications on file prior to visit.      Past Medical History:   Diagnosis Date    Arthritis     CAD (coronary artery disease)     cardiac stents x 3    Cancer ([facility] [address]     right breast    COPD (chronic obstructive pulmonary disease) ([facility] [address]     smoker since age 10    Diabetes mellitus ([facility] [address]     hx > 30 yrs    GERD (gastroesophageal reflux disease)     History of blood transfusion 07/2019    blood transfusions x 3 due to lower GI bleed / [facility].    Hyperlipidemia     meds > 10 yrs    Hypertension     meds > 10 yrs    PVD (peripheral vascular disease) ([facility] [address]     both legs     Past Surgical History:   Procedure Laterality Date    BREAST SURGERY Right 1996    mastectomy due to malignancy / chemo to follow    CARDIAC CATHETERIZATION      COLONOSCOPY  08/25/2016    Isis Meehan MD    CORONARY ANGIOPLASTY WITH STENT PLACEMENT      cardiac stents x 3    ENDOSCOPY, COLON, DIAGNOSTIC      EYE SURGERY      Phaco with IOL OS    HYSTERECTOMY (CERVIX STATUS UNKNOWN)  1968    MYRINGOTOMY AND TYMPANOSTOMY TUBE PLACEMENT Bilateral 8/8/2019    BILATERAL MYRINGOTOMY WITH VENTILATING  TUBE INSERTION performed by Casimiro Greene MD at [facility]      as child    TUNNELED VENOUS PORT PLACEMENT  2015    used for Iron infusions     Social History     Socioeconomic History    Marital status:      Spouse name: Not on file    Number of children: Not on file    Years of education: Not on file    Highest education level: Not on file Occupational History    Not on file   Tobacco Use    Smoking status: Some Days     Packs/day: 0.25     Years: 50.00     Pack years: 12.50     Types: Cigarettes    Smokeless tobacco: Never   Vaping Use    Vaping Use: Never used   Substance and Sexual Activity    Alcohol use: No    Drug use: No    Sexual activity: Not on file   Other Topics Concern    Not on file   Social History Narrative    Not on file     Social Determinants of Health     Financial Resource Strain: Medium Risk    Difficulty of Paying Living Expenses: Somewhat hard   Food Insecurity: Food Insecurity Present    Worried About Running Out of Food in the Last Year: Often true    Ran Out of Food in the Last Year: Often true   Transportation Needs: Not on file   Physical Activity: Not on file   Stress: Not on file   Social Connections: Not on file   Intimate Partner Violence: Not on file   Housing Stability: Not on file     Family History   Problem Relation Age of Onset    Breast Cancer Mother     Diabetes Mother     Stroke Mother     High Blood Pressure Father     Heart Disease Father     High Cholesterol Father     Diabetes Father     Stroke Father     Cancer Sister         oral cancer    Diabetes Brother     Kidney Disease Brother     Alcohol Abuse Brother         liver problems    Cancer Brother         liver & lung cancer    Other Son         PTSD / blood dyscrasia    Cancer Sister         brain & lung cancer    Other Son         MVA at age 29     Allergies:  Diltiazem    Review of Systems   Constitutional:  Negative for activity change, appetite change, diaphoresis and unexpected weight change. Eyes:  Negative for photophobia and visual disturbance. Respiratory:  Negative for chest tightness and shortness of breath. No orthopnea   Cardiovascular:  Negative for chest pain, palpitations and leg swelling. Gastrointestinal:  Negative for abdominal distention and abdominal pain. Genitourinary:  Negative for flank pain and frequency. Musculoskeletal:  Negative for gait problem and joint swelling. Neurological:  Negative for dizziness, weakness, light-headedness and headaches. Psychiatric/Behavioral:  Negative for confusion. Objective:   /64   Pulse 87   Temp 96.9 °F (36.1 °C)   Ht 5' 1.75\" (1.568 m)   Wt 152 lb 9.6 oz (69.2 kg)   SpO2 95%   BMI 28.14 kg/m²     Physical Exam  Constitutional:       General: She is not in acute distress. Appearance: She is well-developed. She is not diaphoretic. HENT:      Head: Normocephalic and atraumatic. Right Ear: External ear normal.      Left Ear: External ear normal.      Nose: Nose normal.   Eyes:      General:         Right eye: No discharge. Left eye: No discharge. Conjunctiva/sclera: Conjunctivae normal.      Pupils: Pupils are equal, round, and reactive to light. Neck:      Thyroid: No thyromegaly. Trachea: No tracheal deviation. Cardiovascular:      Rate and Rhythm: Normal rate and regular rhythm. Pulmonary:      Effort: Pulmonary effort is normal. No respiratory distress. Abdominal:      General: There is no distension. Musculoskeletal:      Cervical back: Neck supple. Skin:     General: Skin is warm and dry. Findings: No bruising or rash. Neurological:      Mental Status: She is alert. Coordination: Coordination normal.   Psychiatric:         Thought Content: Thought content normal.         Judgment: Judgment normal.       Results for POC orders placed in visit on 11/21/22   POCT glycosylated hemoglobin (Hb A1C)   Result Value Ref Range    Hemoglobin A1C 6.0 %       Recent Results (from the past 2016 hour(s))   POCT glycosylated hemoglobin (Hb A1C)    Collection Time: 11/21/22  9:51 AM   Result Value Ref Range    Hemoglobin A1C 6.0 %       [] Pt was seen by provider for      Minutes  Counseling and coordination of care was done for all assessment diagnosis listed for today with patient and any family/friend present.    More than 50% of this visit was spent coordinating current care, obtaining information for prior records, and counseling for current plan of action. Assessment:       Diagnosis Orders   1. Type 2 diabetes mellitus with other circulatory complication, without long-term current use of insulin ([facility])  POCT glycosylated hemoglobin (Hb A1C)      2. Flu vaccine need  Influenza, FLUAD, (age 72 y+), IM, PF, 0.5 mL      3. Pulmonary emphysema, unspecified emphysema type ([address]        4. Stenosis of left carotid artery        5. Coronary artery disease involving native heart without angina pectoris, unspecified vessel or lesion type              Orders Placed This Encounter   Procedures    Influenza, FLUAD, (age 72 y+), IM, PF, 0.5 mL    POCT glycosylated hemoglobin (Hb A1C)       No orders of the defined types were placed in this encounter. Medication List            Accurate as of November 21, 2022 11:23 AM. If you have any questions, ask your nurse or doctor.                 CONTINUE taking these medications      albuterol (2.5 MG/3ML) 0.083% nebulizer solution  Commonly known as: PROVENTIL     amLODIPine 5 MG tablet  Commonly known as: NORVASC     aspirin 81 MG tablet     atorvastatin 80 MG tablet  Commonly known as: LIPITOR     Compressor/Nebulizer Misc  1 inhalation by Does not apply route 4 times daily as needed (copd exac)     estradiol 0.1 MG/GM vaginal cream  Commonly known as: ESTRACE     fluticasone 50 MCG/ACT nasal spray  Commonly known as: FLONASE     ibuprofen 600 MG tablet  Commonly known as: ADVIL;MOTRIN     lisinopril 10 MG tablet  Commonly known as: PRINIVIL;ZESTRIL     metFORMIN 1000 MG tablet  Commonly known as: GLUCOPHAGE  Take 1 tablet by mouth 2 times daily (with meals)     mirabegron 50 MG Tb24  Commonly known as: MYRBETRIQ     nitroGLYCERIN 0.4 MG/HR  Commonly known as: NITRODUR     sotalol 80 MG tablet  Commonly known as: BETAPACE     Trelegy Ellipta 100-62.5-25 MCG/INH Aepb  Generic drug: fluticasone-umeclidin-vilant  Inhale 1 puff into the lungs daily     vitamin B-12 100 MCG tablet  Commonly known as: CYANOCOBALAMIN  Take 1 tablet by mouth daily                Plan:   Return for maw due jan 2023,  6 month appt med. condition. Patient Instructions   MAW due in January    Keeping up with specialist- pulmonary and cardiac    No med changes, discussed adding evening snack due to increasing HbA1c. This note was partially created with the assistance of dictation. This may lead to grammatical or spelling errors. Noé Cool M.D.

## 2022-11-21 NOTE — PATIENT INSTRUCTIONS
MAW due in January    Keeping up with specialist- pulmonary and cardiac    No med changes, discussed adding evening snack due to increasing HbA1c.

## 2022-11-23 ENCOUNTER — HOSPITAL ENCOUNTER (EMERGENCY)
Age: 72
Discharge: HOME | End: 2022-11-23
Attending: EMERGENCY MEDICINE
Payer: MEDICARE

## 2022-11-23 ENCOUNTER — TELEPHONE (OUTPATIENT)
Dept: FAMILY MEDICINE CLINIC | Age: 72
End: 2022-11-23

## 2022-11-23 ENCOUNTER — APPOINTMENT (OUTPATIENT)
Dept: GENERAL RADIOLOGY | Age: 72
End: 2022-11-23
Payer: MEDICARE

## 2022-11-23 VITALS
HEART RATE: 62 BPM | TEMPERATURE: 97.8 F | DIASTOLIC BLOOD PRESSURE: 52 MMHG | WEIGHT: 148 LBS | OXYGEN SATURATION: 97 % | SYSTOLIC BLOOD PRESSURE: 112 MMHG | HEIGHT: 62 IN | BODY MASS INDEX: 27.23 KG/M2 | RESPIRATION RATE: 18 BRPM

## 2022-11-23 DIAGNOSIS — U07.1 COVID-19: Primary | ICD-10-CM

## 2022-11-23 DIAGNOSIS — R42 DIZZINESS: ICD-10-CM

## 2022-11-23 DIAGNOSIS — R68.83 CHILLS: ICD-10-CM

## 2022-11-23 LAB
ALBUMIN SERPL-MCNC: 4.2 G/DL (ref 3.5–4.6)
ALP BLD-CCNC: 93 U/L (ref 40–130)
ALT SERPL-CCNC: 12 U/L (ref 0–33)
ANION GAP SERPL CALCULATED.3IONS-SCNC: 12 MEQ/L (ref 9–15)
AST SERPL-CCNC: 22 U/L (ref 0–35)
BASOPHILS ABSOLUTE: 0 K/UL (ref 0–0.2)
BASOPHILS RELATIVE PERCENT: 0.4 %
BILIRUB SERPL-MCNC: <0.2 MG/DL (ref 0.2–0.7)
BUN BLDV-MCNC: 16 MG/DL (ref 8–23)
CALCIUM SERPL-MCNC: 9.6 MG/DL (ref 8.5–9.9)
CHLORIDE BLD-SCNC: 102 MEQ/L (ref 95–107)
CO2: 26 MEQ/L (ref 20–31)
CREAT SERPL-MCNC: 1.06 MG/DL (ref 0.5–0.9)
EOSINOPHILS ABSOLUTE: 0.1 K/UL (ref 0–0.7)
EOSINOPHILS RELATIVE PERCENT: 1.3 %
GFR SERPL CREATININE-BSD FRML MDRD: 54.9 ML/MIN/{1.73_M2}
GLOBULIN: 2.7 G/DL (ref 2.3–3.5)
GLUCOSE BLD-MCNC: 168 MG/DL (ref 70–99)
HCT VFR BLD CALC: 37.9 % (ref 37–47)
HEMOGLOBIN: 12.7 G/DL (ref 12–16)
INFLUENZA A BY PCR: NEGATIVE
INFLUENZA B BY PCR: NEGATIVE
LYMPHOCYTES ABSOLUTE: 0.5 K/UL (ref 1–4.8)
LYMPHOCYTES RELATIVE PERCENT: 5.4 %
MCH RBC QN AUTO: 28.7 PG (ref 27–31.3)
MCHC RBC AUTO-ENTMCNC: 33.4 % (ref 33–37)
MCV RBC AUTO: 85.8 FL (ref 79.4–94.8)
MONOCYTES ABSOLUTE: 0.7 K/UL (ref 0.2–0.8)
MONOCYTES RELATIVE PERCENT: 8.4 %
NEUTROPHILS ABSOLUTE: 7.2 K/UL (ref 1.4–6.5)
NEUTROPHILS RELATIVE PERCENT: 84.5 %
PDW BLD-RTO: 18.6 % (ref 11.5–14.5)
PLATELET # BLD: 167 K/UL (ref 130–400)
POTASSIUM SERPL-SCNC: 3.9 MEQ/L (ref 3.4–4.9)
RBC # BLD: 4.42 M/UL (ref 4.2–5.4)
SARS-COV-2, NAAT: DETECTED
SODIUM BLD-SCNC: 140 MEQ/L (ref 135–144)
TOTAL PROTEIN: 6.9 G/DL (ref 6.3–8)
WBC # BLD: 8.5 K/UL (ref 4.8–10.8)

## 2022-11-23 PROCEDURE — 96375 TX/PRO/DX INJ NEW DRUG ADDON: CPT

## 2022-11-23 PROCEDURE — 85025 COMPLETE CBC W/AUTO DIFF WBC: CPT

## 2022-11-23 PROCEDURE — 6370000000 HC RX 637 (ALT 250 FOR IP): Performed by: EMERGENCY MEDICINE

## 2022-11-23 PROCEDURE — 87502 INFLUENZA DNA AMP PROBE: CPT

## 2022-11-23 PROCEDURE — 80053 COMPREHEN METABOLIC PANEL: CPT

## 2022-11-23 PROCEDURE — 96374 THER/PROPH/DIAG INJ IV PUSH: CPT

## 2022-11-23 PROCEDURE — 87635 SARS-COV-2 COVID-19 AMP PRB: CPT

## 2022-11-23 PROCEDURE — 71045 X-RAY EXAM CHEST 1 VIEW: CPT

## 2022-11-23 PROCEDURE — 6360000002 HC RX W HCPCS: Performed by: EMERGENCY MEDICINE

## 2022-11-23 PROCEDURE — 2580000003 HC RX 258: Performed by: EMERGENCY MEDICINE

## 2022-11-23 PROCEDURE — 99284 EMERGENCY DEPT VISIT MOD MDM: CPT | Performed by: EMERGENCY MEDICINE

## 2022-11-23 RX ORDER — DEXAMETHASONE 6 MG/1
6 TABLET ORAL
Qty: 5 TABLET | Refills: 0 | Status: SHIPPED | OUTPATIENT
Start: 2022-11-23 | End: 2022-11-28

## 2022-11-23 RX ORDER — DEXAMETHASONE SODIUM PHOSPHATE 10 MG/ML
6 INJECTION INTRAMUSCULAR; INTRAVENOUS ONCE
Status: COMPLETED | OUTPATIENT
Start: 2022-11-23 | End: 2022-11-23

## 2022-11-23 RX ORDER — 0.9 % SODIUM CHLORIDE 0.9 %
1000 INTRAVENOUS SOLUTION INTRAVENOUS ONCE
Status: COMPLETED | OUTPATIENT
Start: 2022-11-23 | End: 2022-11-23

## 2022-11-23 RX ORDER — KETOROLAC TROMETHAMINE 15 MG/ML
15 INJECTION, SOLUTION INTRAMUSCULAR; INTRAVENOUS ONCE
Status: COMPLETED | OUTPATIENT
Start: 2022-11-23 | End: 2022-11-23

## 2022-11-23 RX ORDER — ACETAMINOPHEN 500 MG
1000 TABLET ORAL
Status: COMPLETED | OUTPATIENT
Start: 2022-11-23 | End: 2022-11-23

## 2022-11-23 RX ADMIN — ACETAMINOPHEN 1000 MG: 500 TABLET ORAL at 08:53

## 2022-11-23 RX ADMIN — DEXAMETHASONE SODIUM PHOSPHATE 6 MG: 10 INJECTION INTRAMUSCULAR; INTRAVENOUS at 10:23

## 2022-11-23 RX ADMIN — KETOROLAC TROMETHAMINE 15 MG: 15 INJECTION, SOLUTION INTRAMUSCULAR; INTRAVENOUS at 08:53

## 2022-11-23 RX ADMIN — SODIUM CHLORIDE 1000 ML: 9 INJECTION, SOLUTION INTRAVENOUS at 08:51

## 2022-11-23 ASSESSMENT — ENCOUNTER SYMPTOMS
SORE THROAT: 0
DIARRHEA: 0
NAUSEA: 0
BACK PAIN: 0
SHORTNESS OF BREATH: 0
ABDOMINAL PAIN: 0
COUGH: 0
VOMITING: 0

## 2022-11-23 ASSESSMENT — PAIN DESCRIPTION - DESCRIPTORS: DESCRIPTORS: ACHING

## 2022-11-23 ASSESSMENT — PAIN - FUNCTIONAL ASSESSMENT: PAIN_FUNCTIONAL_ASSESSMENT: 0-10

## 2022-11-23 ASSESSMENT — PAIN SCALES - GENERAL: PAINLEVEL_OUTOF10: 10

## 2022-11-23 ASSESSMENT — PAIN DESCRIPTION - PAIN TYPE: TYPE: ACUTE PAIN

## 2022-11-23 ASSESSMENT — PAIN DESCRIPTION - ONSET: ONSET: ON-GOING

## 2022-11-23 ASSESSMENT — PAIN DESCRIPTION - LOCATION: LOCATION: GENERALIZED

## 2022-11-23 ASSESSMENT — PAIN DESCRIPTION - FREQUENCY: FREQUENCY: CONTINUOUS

## 2022-11-23 NOTE — ED TRIAGE NOTES
The patient came to the ED for chills, body aches, nausea, lightheadedness that began at 3am. The patient states she received her flu vaccine on Monday.

## 2022-11-23 NOTE — TELEPHONE ENCOUNTER
----- Message from Nam Palacios sent at 11/23/2022 12:19 PM EST -----  Subject: Message to Provider    QUESTIONS  Information for Provider? Patients  received a message that the   office needed to know where and when her last mammogram was. Patient   states she had it done at the cancer center in [place] last year. \    Patient would also like the provider to know she went to emergency room and she tested positive for covid. Patient would like a call back to let her know   what she should do and how long she should quarantine.        ---------------------------------------------------------------------------  --------------  [id number] [employer]  [phone]; OK to leave message on voicemail  ---------------------------------------------------------------------------  --------------  SCRIPT ANSWERS  Relationship to Patient?  Self

## 2022-11-23 NOTE — ED PROVIDER NOTES
[facility] ED  eMERGENCYdEPARTMENT eNCOUnter      Pt Name: Hudson Henderson  MRN: [id number]  [place]en 1950  Date of evaluation: 11/23/2022  Ana Rosa Cee MD    CHIEF COMPLAINT           HPI  Hudson Henderson is a 74 y.o. female per chart review has a h/o CAD s/p PCI, COPD, DM II, HTN, Hpl, PVD presents to the ED with myalgia, chills, dizziness. Pt had her flu vaccine on Monday. Pt notes gradual onset, moderate, constant, diffuse myalgia since Monday. +Chills. +Dizziness. Pt denies fever, cp, sob, ab pain, dysuria, diarrhea. ROS  Review of Systems   Constitutional:  Positive for chills. Negative for activity change and fever. HENT:  Negative for ear pain and sore throat. Eyes:  Negative for visual disturbance. Respiratory:  Negative for cough and shortness of breath. Cardiovascular:  Negative for chest pain, palpitations and leg swelling. Gastrointestinal:  Negative for abdominal pain, diarrhea, nausea and vomiting. Genitourinary:  Negative for dysuria. Musculoskeletal:  Positive for myalgias. Negative for back pain. Skin:  Negative for rash. Neurological:  Positive for dizziness. Negative for weakness. Except as noted above the remainder of the review of systems was reviewed and negative.        PAST MEDICAL HISTORY     Past Medical History:   Diagnosis Date    Arthritis     CAD (coronary artery disease)     cardiac stents x 3    Cancer ([address]     right breast    COPD (chronic obstructive pulmonary disease) ([address]     smoker since age 10    Diabetes mellitus ([address]     hx > 30 yrs    GERD (gastroesophageal reflux disease)     History of blood transfusion 07/2019    blood transfusions x 3 due to lower GI bleed / [facility].    Hyperlipidemia     meds > 10 yrs    Hypertension     meds > 10 yrs    PVD (peripheral vascular disease) ([address]     both legs         SURGICAL HISTORY       Past Surgical History:   Procedure Laterality Date    BREAST SURGERY Right 1996 mastectomy due to malignancy / chemo to follow    CARDIAC CATHETERIZATION      COLONOSCOPY  08/25/2016    Jacinta Hinds MD    CORONARY ANGIOPLASTY WITH STENT PLACEMENT      cardiac stents x 3    ENDOSCOPY, COLON, DIAGNOSTIC      EYE SURGERY      Phaco with IOL OS    HYSTERECTOMY (CERVIX STATUS UNKNOWN)  1968    MYRINGOTOMY AND TYMPANOSTOMY TUBE PLACEMENT Bilateral 8/8/2019    BILATERAL MYRINGOTOMY WITH VENTILATING  TUBE INSERTION performed by Jem Montes De Oca MD at [place] [address]      as child    TUNNELED VENOUS PORT PLACEMENT  2015    used for Iron infusions         CURRENTMEDICATIONS       Previous Medications    ALBUTEROL (PROVENTIL) (2.5 MG/3ML) 0.083% NEBULIZER SOLUTION    Take 2.5 mg by nebulization every 6 hours as needed for Wheezing    AMLODIPINE (NORVASC) 5 MG TABLET    Take by mouth    ASPIRIN 81 MG TABLET    Take 81 mg by mouth daily Indications: every other day     ATORVASTATIN (LIPITOR) 80 MG TABLET    Take 40 mg by mouth every evening     ESTRADIOL (ESTRACE) 0.1 MG/GM VAGINAL CREAM    Vaginally.   Finger tip dose every other night    FLUTICASONE (FLONASE) 50 MCG/ACT NASAL SPRAY    1 spray    FLUTICASONE-UMECLIDIN-VILANT (TRELEGY ELLIPTA) 100-62.5-25 MCG/INH AEPB    Inhale 1 puff into the lungs daily    IBUPROFEN (ADVIL;MOTRIN) 600 MG TABLET    Take by mouth    LISINOPRIL (PRINIVIL;ZESTRIL) 10 MG TABLET    Take 10 mg by mouth daily    METFORMIN (GLUCOPHAGE) 1000 MG TABLET    Take 1 tablet by mouth 2 times daily (with meals)    MIRABEGRON ER 50 MG TB24    Take by mouth daily    NEBULIZERS (COMPRESSOR/NEBULIZER) MISC    1 inhalation by Does not apply route 4 times daily as needed (copd exac)    NITROGLYCERIN (NITRODUR) 0.4 MG/HR    Place under the tongue    SOTALOL (BETAPACE) 80 MG TABLET    Take 120 mg by mouth 2 times daily    VITAMIN B-12 (CYANOCOBALAMIN) 100 MCG TABLET    Take 1 tablet by mouth daily       ALLERGIES     Diltiazem    FAMILY HISTORY       Family History   Problem Relation Age of Onset    Breast Cancer Mother     Diabetes Mother     Stroke Mother     High Blood Pressure Father     Heart Disease Father     High Cholesterol Father     Diabetes Father     Stroke Father     Cancer Sister         oral cancer    Diabetes Brother     Kidney Disease Brother     Alcohol Abuse Brother         liver problems    Cancer Brother         liver & lung cancer    Other Son         PTSD / blood dyscrasia    Cancer Sister         brain & lung cancer    Other Son         MVA at age 29          SOCIAL HISTORY       Social History     Socioeconomic History    Marital status:      Spouse name: None    Number of children: None    Years of education: None    Highest education level: None   Tobacco Use    Smoking status: Some Days     Packs/day: 0.25     Years: 50.00     Pack years: 12.50     Types: Cigarettes    Smokeless tobacco: Never   Vaping Use    Vaping Use: Never used   Substance and Sexual Activity    Alcohol use: No    Drug use: No     Social Determinants of Health     Financial Resource Strain: Medium Risk    Difficulty of Paying Living Expenses: Somewhat hard   Food Insecurity: Food Insecurity Present    Worried About Running Out of Food in the Last Year: Often true    Ran Out of Food in the Last Year: Often true         PHYSICAL EXAM       ED Triage Vitals [11/23/22 0835]   BP Temp Temp Source Heart Rate Resp SpO2 Height Weight   113/65 97.8 °F (36.6 °C) Oral 82 18 98 % 5' 2\" (1.575 m) 148 lb (67.1 kg)       Physical Exam  Vitals and nursing note reviewed. Constitutional:       Appearance: She is well-developed. HENT:      Head: Normocephalic. Right Ear: External ear normal.      Left Ear: External ear normal.   Eyes:      Conjunctiva/sclera: Conjunctivae normal.      Pupils: Pupils are equal, round, and reactive to light. Cardiovascular:      Rate and Rhythm: Normal rate and regular rhythm. Heart sounds: Normal heart sounds.    Pulmonary:      Effort: Pulmonary effort is normal. Breath sounds: Normal breath sounds. Abdominal:      General: Bowel sounds are normal. There is no distension. Palpations: Abdomen is soft. Tenderness: There is no abdominal tenderness. Musculoskeletal:         General: Normal range of motion. Cervical back: Normal range of motion and neck supple. Skin:     General: Skin is warm and dry. Neurological:      Mental Status: She is alert and oriented to person, place, and time. Psychiatric:         Mood and Affect: Mood normal.         MDM  73 yo female presents to the ED with chills, myalgia, dizziness. Pt is afebrile, hemodynamically stable. Pt given 1 L NS, IV toradol, PO tylenol in the ED. Labs unremarkable. CXR negative. Pt is positive for covid. Pt given IV decadron in the ED. Patient educated about their test results and their positive covid test.  Patient informed that the current [place] [facility] recommendations are that if you symptoms are mild to go home and self quarantine for 5 days. Patient understands this and is in agreement of this plan. Patient given prescription for decadron, given infection warning signs and will go home and self quarantine. Follow up with pcp. FINAL IMPRESSION      1. COVID-19    2. Dizziness    3.  Chills          DISPOSITION/PLAN   DISPOSITION Decision To Discharge 11/23/2022 10:17:13 AM        DISCHARGE MEDICATIONS:  [unfilled]         Jovi Mccullough MD(electronically signed)  Attending Emergency Physician            Jovi Mccullough MD  11/23/22 9129

## 2022-11-25 ENCOUNTER — TELEPHONE (OUTPATIENT)
Dept: FAMILY MEDICINE CLINIC | Age: 72
End: 2022-11-25

## 2022-11-25 ENCOUNTER — HOSPITAL ENCOUNTER (OUTPATIENT)
Dept: WOMENS IMAGING | Age: 72
Discharge: HOME | End: 2022-11-27

## 2022-11-25 DIAGNOSIS — Z12.31 BREAST CANCER SCREENING BY MAMMOGRAM: ICD-10-CM

## 2022-11-25 NOTE — TELEPHONE ENCOUNTER
Flu shot Monday - states that she got chills and body aches. Pt aware that was from the covid infection. Gypsy Ramírez

## 2022-11-25 NOTE — TELEPHONE ENCOUNTER
----- Message from Rl Janett sent at 11/25/2022  9:54 AM EST -----  Subject: Message to Provider    QUESTIONS  Information for Provider? PT has questions about the FLU shot she recently   had, PT has a reaction to it and went to the ER but she still has some   questions and would like someone from the practice to reach out to her to   discuss. ---------------------------------------------------------------------------  --------------  [employer] INFO  [phone]; OK to leave message on voicemail  ---------------------------------------------------------------------------  --------------  SCRIPT ANSWERS  Relationship to Patient?  Self

## 2022-12-16 ENCOUNTER — HOSPITAL ENCOUNTER (OUTPATIENT)
Dept: WOMENS IMAGING | Age: 72
Discharge: HOME | End: 2022-12-16
Payer: MEDICARE

## 2022-12-16 DIAGNOSIS — Z12.31 BREAST CANCER SCREENING BY MAMMOGRAM: ICD-10-CM

## 2022-12-16 PROCEDURE — 77063 BREAST TOMOSYNTHESIS BI: CPT

## 2023-01-09 ENCOUNTER — OFFICE VISIT (OUTPATIENT)
Dept: FAMILY MEDICINE CLINIC | Age: 73
End: 2023-01-09
Payer: MEDICARE

## 2023-01-09 VITALS — WEIGHT: 148 LBS | HEIGHT: 62 IN | BODY MASS INDEX: 27.23 KG/M2 | TEMPERATURE: 97.8 F

## 2023-01-09 DIAGNOSIS — J43.9 PULMONARY EMPHYSEMA, UNSPECIFIED EMPHYSEMA TYPE (HCC): ICD-10-CM

## 2023-01-09 DIAGNOSIS — E61.1 IRON DEFICIENCY: Primary | ICD-10-CM

## 2023-01-09 DIAGNOSIS — I73.9 PERIPHERAL VASCULAR DISEASE (HCC): ICD-10-CM

## 2023-01-09 DIAGNOSIS — E11.59 TYPE 2 DIABETES MELLITUS WITH OTHER CIRCULATORY COMPLICATION, WITHOUT LONG-TERM CURRENT USE OF INSULIN (HCC): ICD-10-CM

## 2023-01-09 DIAGNOSIS — Z13.29 SCREENING FOR THYROID DISORDER: ICD-10-CM

## 2023-01-09 DIAGNOSIS — E61.1 IRON DEFICIENCY: ICD-10-CM

## 2023-01-09 DIAGNOSIS — D49.2 ABNORMAL SKIN GROWTH: ICD-10-CM

## 2023-01-09 DIAGNOSIS — T14.8XXA NON-HEALING NON-SURGICAL WOUND: ICD-10-CM

## 2023-01-09 DIAGNOSIS — I48.11 LONGSTANDING PERSISTENT ATRIAL FIBRILLATION (HCC): ICD-10-CM

## 2023-01-09 LAB
CREATININE URINE: 56.1 MG/DL
MICROALBUMIN UR-MCNC: <1.2 MG/DL
MICROALBUMIN/CREAT UR-RTO: NORMAL MG/G (ref 0–30)
TSH REFLEX: 0.79 UIU/ML (ref 0.44–3.86)

## 2023-01-09 PROCEDURE — G8400 PT W/DXA NO RESULTS DOC: HCPCS | Performed by: FAMILY MEDICINE

## 2023-01-09 PROCEDURE — 3046F HEMOGLOBIN A1C LEVEL >9.0%: CPT | Performed by: FAMILY MEDICINE

## 2023-01-09 PROCEDURE — G8484 FLU IMMUNIZE NO ADMIN: HCPCS | Performed by: FAMILY MEDICINE

## 2023-01-09 PROCEDURE — 3023F SPIROM DOC REV: CPT | Performed by: FAMILY MEDICINE

## 2023-01-09 PROCEDURE — G8427 DOCREV CUR MEDS BY ELIG CLIN: HCPCS | Performed by: FAMILY MEDICINE

## 2023-01-09 PROCEDURE — 3017F COLORECTAL CA SCREEN DOC REV: CPT | Performed by: FAMILY MEDICINE

## 2023-01-09 PROCEDURE — 1090F PRES/ABSN URINE INCON ASSESS: CPT | Performed by: FAMILY MEDICINE

## 2023-01-09 PROCEDURE — 17003 DESTRUCT PREMALG LES 2-14: CPT | Performed by: FAMILY MEDICINE

## 2023-01-09 PROCEDURE — 1123F ACP DISCUSS/DSCN MKR DOCD: CPT | Performed by: FAMILY MEDICINE

## 2023-01-09 PROCEDURE — 4004F PT TOBACCO SCREEN RCVD TLK: CPT | Performed by: FAMILY MEDICINE

## 2023-01-09 PROCEDURE — G8417 CALC BMI ABV UP PARAM F/U: HCPCS | Performed by: FAMILY MEDICINE

## 2023-01-09 PROCEDURE — 17000 DESTRUCT PREMALG LESION: CPT | Performed by: FAMILY MEDICINE

## 2023-01-09 PROCEDURE — 99214 OFFICE O/P EST MOD 30 MIN: CPT | Performed by: FAMILY MEDICINE

## 2023-01-09 PROCEDURE — 2022F DILAT RTA XM EVC RTNOPTHY: CPT | Performed by: FAMILY MEDICINE

## 2023-01-09 SDOH — ECONOMIC STABILITY: FOOD INSECURITY: WITHIN THE PAST 12 MONTHS, THE FOOD YOU BOUGHT JUST DIDN'T LAST AND YOU DIDN'T HAVE MONEY TO GET MORE.: NEVER TRUE

## 2023-01-09 SDOH — ECONOMIC STABILITY: FOOD INSECURITY: WITHIN THE PAST 12 MONTHS, YOU WORRIED THAT YOUR FOOD WOULD RUN OUT BEFORE YOU GOT MONEY TO BUY MORE.: NEVER TRUE

## 2023-01-09 ASSESSMENT — PATIENT HEALTH QUESTIONNAIRE - PHQ9
SUM OF ALL RESPONSES TO PHQ QUESTIONS 1-9: 0
SUM OF ALL RESPONSES TO PHQ QUESTIONS 1-9: 0
SUM OF ALL RESPONSES TO PHQ9 QUESTIONS 1 & 2: 0
SUM OF ALL RESPONSES TO PHQ QUESTIONS 1-9: 0
SUM OF ALL RESPONSES TO PHQ QUESTIONS 1-9: 0
2. FEELING DOWN, DEPRESSED OR HOPELESS: 0
1. LITTLE INTEREST OR PLEASURE IN DOING THINGS: 0

## 2023-01-09 ASSESSMENT — SOCIAL DETERMINANTS OF HEALTH (SDOH): HOW HARD IS IT FOR YOU TO PAY FOR THE VERY BASICS LIKE FOOD, HOUSING, MEDICAL CARE, AND HEATING?: NOT HARD AT ALL

## 2023-01-09 NOTE — PROGRESS NOTES
Diagnosis Orders   1. Iron deficiency  Iron and TIBC      2. Abnormal skin growth  NE DESTRUCTION PREMALIGNANT LESION 1ST    NE DESTRUCTION PREMALIGNANT LESION 2-14 EA      3. Peripheral vascular disease ([address]        4. Pulmonary emphysema, unspecified emphysema type ([address]        5. Longstanding persistent atrial fibrillation ([facility] [address]        6. Type 2 diabetes mellitus with other circulatory complication, without long-term current use of insulin (HCC)  Microalbumin / Creatinine Urine Ratio      7. Non-healing non-surgical wound  NE DESTRUCTION PREMALIGNANT LESION 1ST    NE DESTRUCTION PREMALIGNANT LESION 2-14 EA      8. Screening for thyroid disorder  TSH with Reflex          Return for MAW exam due after 1/11/23. Orders Placed This Encounter   Procedures    Microalbumin / Creatinine Urine Ratio    Iron and TIBC     Standing Status:   Future     Number of Occurrences:   1     Standing Expiration Date:   1/9/2024    TSH with Reflex     Standing Status:   Future     Number of Occurrences:   1     Standing Expiration Date:   1/9/2024    NE DESTRUCTION PREMALIGNANT LESION 1ST    NE DESTRUCTION PREMALIGNANT LESION 2-14 EA       Mer was seen today for skin exam.    Diagnoses and all orders for this visit:    Iron deficiency  -     Iron and TIBC; Future    Abnormal skin growth  -     NE DESTRUCTION PREMALIGNANT LESION 1ST  -     NE DESTRUCTION PREMALIGNANT LESION 2-14 EA    Peripheral vascular disease (HCC)    Pulmonary emphysema, unspecified emphysema type (HCC)    Longstanding persistent atrial fibrillation (HCC)    Type 2 diabetes mellitus with other circulatory complication, without long-term current use of insulin (HCC)  -     Microalbumin / Creatinine Urine Ratio    Non-healing non-surgical wound  -     NE DESTRUCTION PREMALIGNANT LESION 1ST  -     NE DESTRUCTION PREMALIGNANT LESION 2-14 EA    Screening for thyroid disorder  -     TSH with Reflex; Future      Return for MAW exam due after 1/11/23.     Patient Instructions   Cryotherapy instructions    Post op instructions given. A printed copy provided. It is best to leave blisters alone if they form for the first 1-3 days to allow the desired damaged tissue (precancer lesion, wart, or whatever lesion is being removed) to separate from healthy tissue. The area should be covered with a bandage to prevent blister breakage and dirt exposure. The wounds should remain dry while there is a blister, therefore if this is a sweaty location, like the foot ,you may need to change clothing, such as socks, multiple times per day. Remember that the reason for cryosurgery to be done is to damage skin that is not normal so that it will be removed. Therefore the area could be red or pink, can sting or burn for the first day or 2, will appear raw when the skin sloughs off. When the blister(s) pop or patient removes the top as instructed between day 3-5, apply antibiotic (NOT triple antibiotic, one brand is Neosporin) ointment, usually Bacitracin, and a bandage to affected area(s). The ointment should be applied to the open area as long as it is not covered with skin. Exposed tissue is meant to be moist.      Once a scab is formed the patient may stop applying ointment. The scab may appear yellow while moist, don't confuse this with infection. If the wound is infection pus will drain from the site. If this treatment was for a large wart you may note that a plug of skin may fall out of the area that was treated. That is the center of the wart and it is appropriate for it to come out. If exposed skin remains, treat that area as you would a ruptured blister as mentioned above. Bacitracin sample supplied                   Patient here because she has a lesion on her back that has been growing and she keeps catching it now it is bleeding and will not heal.  She also would like the rest of her back examined since she cannot see it.     Chart review was done for deficits in maintenance discussed with patient and she is interested in pursuing completion    No concerns with her extremities today. Her breathing is comfortable. She has no concerns with irregular heartbeat. And her diabetic checks are up-to-date with the exception of urine which we discussed      Current Outpatient Medications on File Prior to Visit   Medication Sig Dispense Refill    metFORMIN (GLUCOPHAGE) 1000 MG tablet Take 1 tablet by mouth 2 times daily (with meals) 180 tablet 0    Nebulizers (COMPRESSOR/NEBULIZER) MISC 1 inhalation by Does not apply route 4 times daily as needed (copd exac) 1 each 3    vitamin B-12 (CYANOCOBALAMIN) 100 MCG tablet Take 1 tablet by mouth daily 30 tablet 11    amLODIPine (NORVASC) 5 MG tablet Take by mouth      ibuprofen (ADVIL;MOTRIN) 600 MG tablet Take by mouth      estradiol (ESTRACE) 0.1 MG/GM vaginal cream Vaginally. Finger tip dose every other night      lisinopril (PRINIVIL;ZESTRIL) 10 MG tablet Take 10 mg by mouth daily      fluticasone-umeclidin-vilant (TRELEGY ELLIPTA) 100-62.5-25 MCG/INH AEPB Inhale 1 puff into the lungs daily 1 each 0    aspirin 81 MG tablet Take 81 mg by mouth daily Indications: every other day       sotalol (BETAPACE) 80 MG tablet Take 120 mg by mouth 2 times daily      albuterol (PROVENTIL) (2.5 MG/3ML) 0.083% nebulizer solution Take 2.5 mg by nebulization every 6 hours as needed for Wheezing      Mirabegron ER 50 MG TB24 Take by mouth daily      atorvastatin (LIPITOR) 80 MG tablet Take 40 mg by mouth every evening       fluticasone (FLONASE) 50 MCG/ACT nasal spray 1 spray      nitroGLYCERIN (NITRODUR) 0.4 MG/HR Place under the tongue       No current facility-administered medications on file prior to visit. Diltiazem    Review of Systems   Constitutional:  Negative for chills and fever. Skin:  Positive for wound. Allergic/Immunologic: Negative for environmental allergies, food allergies and immunocompromised state.    Hematological: Negative for adenopathy. Does not bruise/bleed easily. Psychiatric/Behavioral:  Negative for behavioral problems and sleep disturbance. Temp 97.8 °F (36.6 °C)   Ht 5' 2\" (1.575 m)   Wt 148 lb (67.1 kg)   BMI 27.07 kg/m²   Physical Exam  Constitutional:       General: She is not in acute distress. Appearance: Normal appearance. She is well-developed. She is not toxic-appearing. HENT:      Head: Normocephalic and atraumatic. Right Ear: Hearing and tympanic membrane normal.      Left Ear: Hearing and tympanic membrane normal.      Nose: Nose normal. No nasal deformity. Eyes:      General: Lids are normal.         Right eye: No discharge. Left eye: No discharge. Conjunctiva/sclera: Conjunctivae normal.      Pupils: Pupils are equal, round, and reactive to light. Neck:      Thyroid: No thyroid mass or thyromegaly. Vascular: No JVD. Trachea: Trachea and phonation normal.   Cardiovascular:      Rate and Rhythm: Normal rate and regular rhythm. Pulmonary:      Effort: No accessory muscle usage or respiratory distress. Musculoskeletal:      Cervical back: Full passive range of motion without pain. Comments: FROM all large muscle groups and joints as witnessed when walking to exam table, getting on, and getting off the exam table. Skin:     General: Skin is warm and dry. Findings: No rash. Comments: Patient has 2 lesions in the center of the back one is 2 mm another is 6 mm with evidence of bleeding and irregularity. Neurological:      Mental Status: She is alert. Motor: No tremor or atrophy. Gait: Gait normal.   Psychiatric:         Speech: Speech normal.         Behavior: Behavior normal.         Thought Content: Thought content normal.         Procedure consent  The procedure was discussed with the patient. All questions were answered and alternative options discussed.   The patient is aware of the risks of bleeding, infection, unsatisfactory scar result. Informed consent paperwork was signed by the patient. Liquid nitrogen was applied for 2-6 seconds to affected areas with thaw allowed between dosing for a total of 2 applications. Applied to 2 lesion(s). The patient tolerated the procedure well. Diagnosis Orders   1. Iron deficiency  Iron and TIBC      2. Abnormal skin growth  TN DESTRUCTION PREMALIGNANT LESION 1ST    TN DESTRUCTION PREMALIGNANT LESION 2-14 EA      3. Peripheral vascular disease ([facility] [address]        4. Pulmonary emphysema, unspecified emphysema type ([facility] [address]        5. Longstanding persistent atrial fibrillation ([facility] [address]        6. Type 2 diabetes mellitus with other circulatory complication, without long-term current use of insulin (HCC)  Microalbumin / Creatinine Urine Ratio      7. Non-healing non-surgical wound  TN DESTRUCTION PREMALIGNANT LESION 1ST    TN DESTRUCTION PREMALIGNANT LESION 2-14 EA      8. Screening for thyroid disorder  TSH with Reflex          Return for MAW exam due after 1/11/23. Orders Placed This Encounter   Procedures    Microalbumin / Creatinine Urine Ratio    Iron and TIBC     Standing Status:   Future     Number of Occurrences:   1     Standing Expiration Date:   1/9/2024    TSH with Reflex     Standing Status:   Future     Number of Occurrences:   1     Standing Expiration Date:   1/9/2024    TN DESTRUCTION PREMALIGNANT LESION 1ST    TN DESTRUCTION PREMALIGNANT LESION 2-14 EA               Patient Instructions   Cryotherapy instructions    Post op instructions given. A printed copy provided. It is best to leave blisters alone if they form for the first 1-3 days to allow the desired damaged tissue (precancer lesion, wart, or whatever lesion is being removed) to separate from healthy tissue. The area should be covered with a bandage to prevent blister breakage and dirt exposure.       The wounds should remain dry while there is a blister, therefore if this is a sweaty location, like the foot ,you may need to change clothing, such as socks, multiple times per day. Remember that the reason for cryosurgery to be done is to damage skin that is not normal so that it will be removed. Therefore the area could be red or pink, can sting or burn for the first day or 2, will appear raw when the skin sloughs off. When the blister(s) pop or patient removes the top as instructed between day 3-5, apply antibiotic (NOT triple antibiotic, one brand is Neosporin) ointment, usually Bacitracin, and a bandage to affected area(s). The ointment should be applied to the open area as long as it is not covered with skin. Exposed tissue is meant to be moist.      Once a scab is formed the patient may stop applying ointment. The scab may appear yellow while moist, don't confuse this with infection. If the wound is infection pus will drain from the site. If this treatment was for a large wart you may note that a plug of skin may fall out of the area that was treated. That is the center of the wart and it is appropriate for it to come out. If exposed skin remains, treat that area as you would a ruptured blister as mentioned above. Bacitracin sample supplied               DO NOT USE TRIPLE ANTIBIOTIC ON THE WOUND    It is best to leave blisters alone if they form. They should be covered with a bandage to prevent blister breakage and dirt exposure. The wounds should remain dry while there is a blister, therefore if this is a sweaty location like the foot you may need to change socks multiple times per day. If blister(s) pop or patient pops with a sterilized needle, apply antibiotic (NOT triple antibiotic, one brand is Neosporin) ointment and a bandage to affected area(s). The ointment should be applied to the open area as long as it is not covered with skin. Exposed tissue is meant to be moist.  Once a scab formed she may stop applying ointment.   If this treatment was for a large wart you may note that a plug of skin may fall out of the area that was treated. That is the center of the wart and it is appropriate for it to come out. If exposed skin remains, treat that area as you would a ruptured blister as mentioned above.

## 2023-01-09 NOTE — PATIENT INSTRUCTIONS
Cryotherapy instructions    Post op instructions given. A printed copy provided. It is best to leave blisters alone if they form for the first 1-3 days to allow the desired damaged tissue (precancer lesion, wart, or whatever lesion is being removed) to separate from healthy tissue. The area should be covered with a bandage to prevent blister breakage and dirt exposure. The wounds should remain dry while there is a blister, therefore if this is a sweaty location, like the foot ,you may need to change clothing, such as socks, multiple times per day. Remember that the reason for cryosurgery to be done is to damage skin that is not normal so that it will be removed. Therefore the area could be red or pink, can sting or burn for the first day or 2, will appear raw when the skin sloughs off. When the blister(s) pop or patient removes the top as instructed between day 3-5, apply antibiotic (NOT triple antibiotic, one brand is Neosporin) ointment, usually Bacitracin, and a bandage to affected area(s). The ointment should be applied to the open area as long as it is not covered with skin. Exposed tissue is meant to be moist.      Once a scab is formed the patient may stop applying ointment. The scab may appear yellow while moist, don't confuse this with infection. If the wound is infection pus will drain from the site. If this treatment was for a large wart you may note that a plug of skin may fall out of the area that was treated. That is the center of the wart and it is appropriate for it to come out. If exposed skin remains, treat that area as you would a ruptured blister as mentioned above.     Bacitracin sample supplied

## 2023-01-10 DIAGNOSIS — E61.1 IRON DEFICIENCY: Primary | ICD-10-CM

## 2023-01-10 LAB
IRON SATURATION: 16 % (ref 20–55)
IRON: 53 UG/DL (ref 37–145)
TOTAL IRON BINDING CAPACITY: 333 UG/DL (ref 250–450)
UNSATURATED IRON BINDING CAPACITY: 280 UG/DL (ref 112–347)

## 2023-01-10 NOTE — RESULT ENCOUNTER NOTE
Notify pt her iron level remains low. I would like her to start taking iron 325 mg at least 3 days a week. Can obtain this over-the-counter. diagnosis iron deficiency.   Order CBC with auto differential and iron/TIBC to be repeated in 2 months

## 2023-01-12 ENCOUNTER — OFFICE VISIT (OUTPATIENT)
Dept: FAMILY MEDICINE CLINIC | Age: 73
End: 2023-01-12
Payer: MEDICARE

## 2023-01-12 VITALS
DIASTOLIC BLOOD PRESSURE: 62 MMHG | WEIGHT: 141.6 LBS | BODY MASS INDEX: 26.06 KG/M2 | HEIGHT: 62 IN | HEART RATE: 70 BPM | SYSTOLIC BLOOD PRESSURE: 114 MMHG | OXYGEN SATURATION: 96 % | TEMPERATURE: 97.8 F

## 2023-01-12 DIAGNOSIS — E11.59 TYPE 2 DIABETES MELLITUS WITH OTHER CIRCULATORY COMPLICATION, WITHOUT LONG-TERM CURRENT USE OF INSULIN (HCC): ICD-10-CM

## 2023-01-12 DIAGNOSIS — Z00.00 MEDICARE ANNUAL WELLNESS VISIT, SUBSEQUENT: Primary | ICD-10-CM

## 2023-01-12 PROBLEM — F17.200 TOBACCO DEPENDENCE SYNDROME: Status: ACTIVE | Noted: 2023-01-12

## 2023-01-12 PROBLEM — K44.9 HIATAL HERNIA: Status: ACTIVE | Noted: 2023-01-12

## 2023-01-12 PROBLEM — N39.3 STRESS INCONTINENCE OF URINE: Status: ACTIVE | Noted: 2021-12-09

## 2023-01-12 PROBLEM — K31.819 ANGIODYSPLASIA OF DUODENUM: Status: ACTIVE | Noted: 2023-01-12

## 2023-01-12 PROBLEM — K57.10 DIVERTICULOSIS OF SMALL INTESTINE: Status: ACTIVE | Noted: 2023-01-12

## 2023-01-12 PROBLEM — M19.021 OSTEOARTHRITIS OF RIGHT ELBOW: Status: ACTIVE | Noted: 2023-01-12

## 2023-01-12 PROBLEM — E55.9 VITAMIN D DEFICIENCY: Status: ACTIVE | Noted: 2023-01-12

## 2023-01-12 PROBLEM — N95.9 MENOPAUSAL AND POSTMENOPAUSAL DISORDER: Status: ACTIVE | Noted: 2023-01-12

## 2023-01-12 PROBLEM — E87.6 HYPOKALEMIA: Status: ACTIVE | Noted: 2023-01-12

## 2023-01-12 PROBLEM — R31.21 ASYMPTOMATIC MICROSCOPIC HEMATURIA: Status: ACTIVE | Noted: 2021-12-09

## 2023-01-12 PROBLEM — D12.6 TUBULAR ADENOMA OF COLON: Status: ACTIVE | Noted: 2023-01-12

## 2023-01-12 PROCEDURE — 3046F HEMOGLOBIN A1C LEVEL >9.0%: CPT | Performed by: FAMILY MEDICINE

## 2023-01-12 PROCEDURE — G0439 PPPS, SUBSEQ VISIT: HCPCS | Performed by: FAMILY MEDICINE

## 2023-01-12 PROCEDURE — 3074F SYST BP LT 130 MM HG: CPT | Performed by: FAMILY MEDICINE

## 2023-01-12 PROCEDURE — 1123F ACP DISCUSS/DSCN MKR DOCD: CPT | Performed by: FAMILY MEDICINE

## 2023-01-12 PROCEDURE — G8484 FLU IMMUNIZE NO ADMIN: HCPCS | Performed by: FAMILY MEDICINE

## 2023-01-12 PROCEDURE — 3017F COLORECTAL CA SCREEN DOC REV: CPT | Performed by: FAMILY MEDICINE

## 2023-01-12 PROCEDURE — 3078F DIAST BP <80 MM HG: CPT | Performed by: FAMILY MEDICINE

## 2023-01-12 ASSESSMENT — PATIENT HEALTH QUESTIONNAIRE - PHQ9
SUM OF ALL RESPONSES TO PHQ QUESTIONS 1-9: 0
2. FEELING DOWN, DEPRESSED OR HOPELESS: 0
SUM OF ALL RESPONSES TO PHQ QUESTIONS 1-9: 0
SUM OF ALL RESPONSES TO PHQ9 QUESTIONS 1 & 2: 0
1. LITTLE INTEREST OR PLEASURE IN DOING THINGS: 0
SUM OF ALL RESPONSES TO PHQ QUESTIONS 1-9: 0
SUM OF ALL RESPONSES TO PHQ QUESTIONS 1-9: 0

## 2023-01-12 ASSESSMENT — LIFESTYLE VARIABLES
HOW OFTEN DO YOU HAVE A DRINK CONTAINING ALCOHOL: NEVER
HOW MANY STANDARD DRINKS CONTAINING ALCOHOL DO YOU HAVE ON A TYPICAL DAY: PATIENT DOES NOT DRINK

## 2023-01-12 NOTE — PROGRESS NOTES
Medicare Annual Wellness Visit    Nahid Crockett is here for Medicare AWV    Assessment & Plan   Medicare annual wellness visit, subsequent  Type 2 diabetes mellitus with other circulatory complication, without long-term current use of insulin ([address]  -     Hemoglobin A1C; Future  -     Basic Metabolic Panel; Future      Recommendations for Preventive Services Due: see orders and patient instructions/AVS.  Recommended screening schedule for the next 5-10 years is provided to the patient in written form: see Patient Instructions/AVS.     Return for Medicare Annual Wellness Visit in 1 year. Subjective       Patient's complete Health Risk Assessment and screening values have been reviewed and are found in Flowsheets. The following problems were reviewed today and where indicated follow up appointments were made and/or referrals ordered. Positive Risk Factor Screenings with Interventions:               General HRA Questions:  Select all that apply: (!) New or Increased Fatigue    Fatigue Interventions:  Pt is looking to quit smoking which she has done before on her own and thinks that will help  Weight loss noted, pt states snacking less and is not surprised by the weight loss. Still a healthy weight            Tobacco Use:  Tobacco Use: High Risk    Smoking Tobacco Use: Some Days    Smokeless Tobacco Use: Never    Passive Exposure: Not on file     E-cigarette/Vaping       Questions Responses    E-cigarette/Vaping Use Never User    Start Date     Passive Exposure     Quit Date     Counseling Given     Comments           Interventions:  Patient advised to follow up in the office for further evalution and treatment                        Objective   Vitals:    01/12/23 0920   BP: 114/62   Pulse: 70   Temp: 97.8 °F (36.6 °C)   SpO2: 96%   Weight: 141 lb 9.6 oz (64.2 kg)   Height: 5' 1.5\" (1.562 m)      Body mass index is 26.32 kg/m².                Allergies   Allergen Reactions    Diltiazem Hives     Prior to Visit Medications    Medication Sig Taking? Authorizing Provider   metFORMIN (GLUCOPHAGE) 1000 MG tablet Take 1 tablet by mouth 2 times daily (with meals) Yes Nydia Landis MD   Nebulizers (COMPRESSOR/NEBULIZER) MISC 1 inhalation by Does not apply route 4 times daily as needed (copd exac) Yes Nydia Landis MD   vitamin B-12 (CYANOCOBALAMIN) 100 MCG tablet Take 1 tablet by mouth daily Yes Nydia Landis MD   amLODIPine (NORVASC) 5 MG tablet Take by mouth Yes Historical Provider, MD   ibuprofen (ADVIL;MOTRIN) 600 MG tablet Take by mouth Yes Historical Provider, MD   estradiol (ESTRACE) 0.1 MG/GM vaginal cream Vaginally.   Finger tip dose every other night Yes Historical Provider, MD   lisinopril (PRINIVIL;ZESTRIL) 10 MG tablet Take 10 mg by mouth daily Yes Historical Provider, MD   fluticasone-umeclidin-vilant (TRELEGY ELLIPTA) 100-62.5-25 MCG/INH AEPB Inhale 1 puff into the lungs daily Yes Nydia Landis MD   aspirin 81 MG tablet Take 81 mg by mouth daily Indications: every other day  Yes Historical Provider, MD   sotalol (BETAPACE) 80 MG tablet Take 120 mg by mouth 2 times daily Yes Historical Provider, MD   albuterol (PROVENTIL) (2.5 MG/3ML) 0.083% nebulizer solution Take 2.5 mg by nebulization every 6 hours as needed for Wheezing Yes Historical Provider, MD   atorvastatin (LIPITOR) 80 MG tablet Take 40 mg by mouth every evening  Yes Historical Provider, MD   fluticasone (FLONASE) 50 MCG/ACT nasal spray 1 spray Yes Historical Provider, MD   nitroGLYCERIN (NITRODUR) 0.4 MG/HR Place under the tongue Yes Historical Provider, MD [place] (Including outside providers/suppliers regularly involved in providing care):   Patient Care Team:  Nydia Landis MD as PCP - General (Family Medicine)  Nydia Landis MD as PCP - [facility] Empaneled Provider  Sonu Alves MD as Consulting Physician (Pulmonology)  Brown Zuniga MD as Consulting Physician (Urology)  Veronique Montes De Oca MD as Consulting Physician (Internal Medicine)     Reviewed and updated this visit:  Tobacco  Allergies  Meds  Med Hx  Surg Hx  Soc Hx  Fam Hx

## 2023-01-12 NOTE — PATIENT INSTRUCTIONS
Fatigue: Care Instructions  Your Care Instructions     Fatigue is a feeling of tiredness, exhaustion, or lack of energy. You may feel fatigue because of too much or not enough activity. It can also come from stress, lack of sleep, boredom, and poor diet. Many medical problems, such as viral infections, can cause fatigue. Emotional problems, especially depression, are often the cause of fatigue. Fatigue is most often a symptom of another problem. Treatment for fatigue depends on the cause. For example, if you have fatigue because you have a certain health problem, treating this problem also treats your fatigue. If depression or anxiety is the cause, treatment may help. Follow-up care is a key part of your treatment and safety. Be sure to make and go to all appointments, and call your doctor if you are having problems. It's also a good idea to know your test results and keep a list of the medicines you take. How can you care for yourself at home? Get regular exercise. But don't overdo it. Go back and forth between rest and exercise. Get plenty of rest.  Eat a healthy diet. Do not skip meals, especially breakfast.  Reduce your use of caffeine, tobacco, and alcohol. Caffeine is most often found in coffee, tea, cola drinks, and chocolate. Limit medicines that can cause fatigue. This includes tranquilizers and cold and allergy medicines. When should you call for help? Watch closely for changes in your health, and be sure to contact your doctor if:    You have new symptoms such as fever or a rash.     Your fatigue gets worse.     You have been feeling down, depressed, or hopeless. Or you may have lost interest in things that you usually enjoy.     You are not getting better as expected. Where can you learn more? Go to http://www.duncan.com/ and enter D922 to learn more about \"Fatigue: Care Instructions. \"  Current as of: February 9, 2022               Content Version: 13.5  © 0023-2989 Healthwise, Incorporated. Care instructions adapted under license by [facility] ([facility]). If you have questions about a medical condition or this instruction, always ask your healthcare professional. [address] any warranty or liability for your use of this information. Advance Directives: Care Instructions  Overview  An advance directive is a legal way to state your wishes at the end of your life. It tells your family and your doctor what to do if you can't say what you want. There are two main types of advance directives. You can change them any time your wishes change. Living will. This form tells your family and your doctor your wishes about life support and other treatment. The form is also called a declaration. Medical power of . This form lets you name a person to make treatment decisions for you when you can't speak for yourself. This person is called a health care agent (health care proxy, health care surrogate). The form is also called a durable power of  for health care. If you do not have an advance directive, decisions about your medical care may be made by a family member, or by a doctor or a  who doesn't know you. It may help to think of an advance directive as a gift to the people who care for you. If you have one, they won't have to make tough decisions by themselves. For more information, including forms for your state, see the [address] website (www.caringinfo.org/planning/advance-directives/). Follow-up care is a key part of your treatment and safety. Be sure to make and go to all appointments, and call your doctor if you are having problems. It's also a good idea to know your test results and keep a list of the medicines you take. What should you include in an advance directive? Many states have a unique advance directive form.  (It may ask you to address specific issues.) Or you might use a universal form that's approved by many states. If your form doesn't tell you what to address, it may be hard to know what to include in your advance directive. Use the questions below to help you get started. Who do you want to make decisions about your medical care if you are not able to? What life-support measures do you want if you have a serious illness that gets worse over time or can't be cured? What are you most afraid of that might happen? (Maybe you're afraid of having pain, losing your independence, or being kept alive by machines.)  Where would you prefer to die? (Your home? A hospital? A nursing home?)  Do you want to donate your organs when you die? Do you want certain Islam practices performed before you die? When should you call for help? Be sure to contact your doctor if you have any questions. Where can you learn more? Go to http://www.duncan.com/ and enter R264 to learn more about \"Advance Directives: Care Instructions. \"  Current as of: June 16, 2022               Content Version: 13.5  © 0551-6566 Healthwise, Incorporated. Care instructions adapted under license by [facility] ([facility]). If you have questions about a medical condition or this instruction, always ask your healthcare professional. [place] [address] any warranty or liability for your use of this information. Personalized Preventive Plan for Ivory Linton - 1/12/2023  Medicare offers a range of preventive health benefits. Some of the tests and screenings are paid in full while other may be subject to a deductible, co-insurance, and/or copay. Some of these benefits include a comprehensive review of your medical history including lifestyle, illnesses that may run in your family, and various assessments and screenings as appropriate. After reviewing your medical record and screening and assessments performed today your provider may have ordered immunizations, labs, imaging, and/or referrals for you.   A list of these orders (if applicable) as well as your Preventive Care list are included within your After Visit Summary for your review. Other Preventive Recommendations:    A preventive eye exam performed by an eye specialist is recommended every 1-2 years to screen for glaucoma; cataracts, macular degeneration, and other eye disorders. A preventive dental visit is recommended every 6 months. Try to get at least 150 minutes of exercise per week or 10,000 steps per day on a pedometer . Order or download the FREE \"Exercise & Physical Activity: Your Everyday Guide\" from [employer]. Call 0-319.752.3537 or search [employer] online. You need 1566-3738 mg of calcium and 5580-7020 IU of vitamin D per day. It is possible to meet your calcium requirement with diet alone, but a vitamin D supplement is usually necessary to meet this goal.  When exposed to the sun, use a sunscreen that protects against both UVA and UVB radiation with an SPF of 30 or greater. Reapply every 2 to 3 hours or after sweating, drying off with a towel, or swimming. Always wear a seat belt when traveling in a car. Always wear a helmet when riding a bicycle or motorcycle.

## 2023-01-26 ENCOUNTER — OFFICE VISIT (OUTPATIENT)
Dept: FAMILY MEDICINE CLINIC | Age: 73
End: 2023-01-26
Payer: MEDICARE

## 2023-01-26 VITALS
OXYGEN SATURATION: 95 % | WEIGHT: 141 LBS | HEART RATE: 71 BPM | DIASTOLIC BLOOD PRESSURE: 64 MMHG | SYSTOLIC BLOOD PRESSURE: 116 MMHG | HEIGHT: 62 IN | BODY MASS INDEX: 25.95 KG/M2

## 2023-01-26 DIAGNOSIS — S20.212A CONTUSION OF RIB ON LEFT SIDE, INITIAL ENCOUNTER: Primary | ICD-10-CM

## 2023-01-26 DIAGNOSIS — I20.9 ANGINA PECTORIS (HCC): ICD-10-CM

## 2023-01-26 PROCEDURE — G8400 PT W/DXA NO RESULTS DOC: HCPCS | Performed by: FAMILY MEDICINE

## 2023-01-26 PROCEDURE — 3074F SYST BP LT 130 MM HG: CPT | Performed by: FAMILY MEDICINE

## 2023-01-26 PROCEDURE — G8427 DOCREV CUR MEDS BY ELIG CLIN: HCPCS | Performed by: FAMILY MEDICINE

## 2023-01-26 PROCEDURE — 3078F DIAST BP <80 MM HG: CPT | Performed by: FAMILY MEDICINE

## 2023-01-26 PROCEDURE — G8417 CALC BMI ABV UP PARAM F/U: HCPCS | Performed by: FAMILY MEDICINE

## 2023-01-26 PROCEDURE — 1123F ACP DISCUSS/DSCN MKR DOCD: CPT | Performed by: FAMILY MEDICINE

## 2023-01-26 PROCEDURE — 3017F COLORECTAL CA SCREEN DOC REV: CPT | Performed by: FAMILY MEDICINE

## 2023-01-26 PROCEDURE — 99214 OFFICE O/P EST MOD 30 MIN: CPT | Performed by: FAMILY MEDICINE

## 2023-01-26 PROCEDURE — G8484 FLU IMMUNIZE NO ADMIN: HCPCS | Performed by: FAMILY MEDICINE

## 2023-01-26 PROCEDURE — 1090F PRES/ABSN URINE INCON ASSESS: CPT | Performed by: FAMILY MEDICINE

## 2023-01-26 PROCEDURE — 4004F PT TOBACCO SCREEN RCVD TLK: CPT | Performed by: FAMILY MEDICINE

## 2023-01-26 RX ORDER — HYDROCHLOROTHIAZIDE 12.5 MG/1
TABLET ORAL
COMMUNITY
Start: 2023-01-26

## 2023-01-26 RX ORDER — MELOXICAM 7.5 MG/1
TABLET ORAL
COMMUNITY
Start: 2022-08-10 | End: 2023-01-26 | Stop reason: ALTCHOICE

## 2023-01-26 RX ORDER — TRAMADOL HYDROCHLORIDE 50 MG/1
50 TABLET ORAL EVERY 6 HOURS PRN
Qty: 42 TABLET | Refills: 0 | Status: SHIPPED | OUTPATIENT
Start: 2023-01-26 | End: 2023-02-06

## 2023-01-26 RX ORDER — DEXTROMETHORPHAN HYDROBROMIDE AND PROMETHAZINE HYDROCHLORIDE 15; 6.25 MG/5ML; MG/5ML
SYRUP ORAL
COMMUNITY
Start: 2020-04-28

## 2023-01-26 ASSESSMENT — ENCOUNTER SYMPTOMS
ABDOMINAL DISTENTION: 0
ABDOMINAL PAIN: 0
SHORTNESS OF BREATH: 0
CHEST TIGHTNESS: 0
PHOTOPHOBIA: 0

## 2023-01-26 NOTE — PROGRESS NOTES
Diagnosis Orders   1. Contusion of rib on left side, initial encounter  traMADol (ULTRAM) 50 MG tablet      2. Angina pectoris ([address]          Return if symptoms worsen or fail to improve. Patient Instructions   Educated patient on deep breathing exercises at least once an hour to help aerate the lungs and prevent pneumonia. Declines interest in x-ray at this time. No current angina symptoms. Subjective:      Patient ID: Salina Hdz is a 74 y.o. female who presents for:  Chief Complaint   Patient presents with    Fall     Pt fell on Tuesday, hurt her left ribs. Pt did not hit her head or anything else. Left arm broke most of the fall. Pt was coming in through the front door & dog tripped her. Patient states dog tripped her at home. She fell landing mostly on her left arm pushing into her left rib. Immediate onset of rib pain. No bruising. No shortness of breath but she is taking shallow breaths due to pain. She is using her breathing treatments at home. Denies any loss of consciousness or other pain. Current Outpatient Medications on File Prior to Visit   Medication Sig Dispense Refill    fluticasone-umeclidin-vilant (TRELEGY ELLIPTA) 100-62.5-25 MCG/ACT AEPB inhaler Inhale 1 puff into the lungs      promethazine-dextromethorphan (PROMETHAZINE-DM) 6.25-15 MG/5ML syrup Take by mouth      metFORMIN (GLUCOPHAGE) 1000 MG tablet Take 1 tablet by mouth 2 times daily (with meals) 180 tablet 0    Nebulizers (COMPRESSOR/NEBULIZER) MISC 1 inhalation by Does not apply route 4 times daily as needed (copd exac) 1 each 3    vitamin B-12 (CYANOCOBALAMIN) 100 MCG tablet Take 1 tablet by mouth daily 30 tablet 11    amLODIPine (NORVASC) 5 MG tablet Take by mouth      ibuprofen (ADVIL;MOTRIN) 600 MG tablet Take by mouth      estradiol (ESTRACE) 0.1 MG/GM vaginal cream Vaginally.   Finger tip dose every other night      lisinopril (PRINIVIL;ZESTRIL) 10 MG tablet Take 10 mg by mouth daily      aspirin 81 MG tablet Take 81 mg by mouth daily Indications: every other day       sotalol (BETAPACE) 80 MG tablet Take 120 mg by mouth 2 times daily      albuterol (PROVENTIL) (2.5 MG/3ML) 0.083% nebulizer solution Take 2.5 mg by nebulization every 6 hours as needed for Wheezing      atorvastatin (LIPITOR) 80 MG tablet Take 40 mg by mouth every evening       fluticasone (FLONASE) 50 MCG/ACT nasal spray 1 spray      nitroGLYCERIN (NITRODUR) 0.4 MG/HR Place under the tongue      hydroCHLOROthiazide (HYDRODIURIL) 12.5 MG tablet        No current facility-administered medications on file prior to visit.      Past Medical History:   Diagnosis Date    Arthritis     Breast cancer ([facility] [address] 1989    right breast    CAD (coronary artery disease)     cardiac stents x 3    Cancer ([facility] [address]     right breast    COPD (chronic obstructive pulmonary disease) ([facility] [address]     smoker since age 10    Diabetes mellitus ([facility] [address]     hx > 30 yrs    GERD (gastroesophageal reflux disease)     History of blood transfusion 07/2019    blood transfusions x 3 due to lower GI bleed / [facility].    Hx antineoplastic chemo 1989    right breast cancer    Hyperlipidemia     meds > 10 yrs    Hypertension     meds > 10 yrs    PVD (peripheral vascular disease) ([facility] [address]     both legs     Past Surgical History:   Procedure Laterality Date    BREAST SURGERY Right 1996    mastectomy due to malignancy / chemo to follow    CARDIAC CATHETERIZATION      COLONOSCOPY  08/25/2016    Denis Pickett MD    CORONARY ANGIOPLASTY WITH STENT PLACEMENT      cardiac stents x 3    ENDOSCOPY, COLON, DIAGNOSTIC      EYE SURGERY      [place] with IOL OS    HYSTERECTOMY (CERVIX STATUS UNKNOWN)  1968    MASTECTOMY Right 1989    MYRINGOTOMY AND TYMPANOSTOMY TUBE PLACEMENT Bilateral 08/08/2019    BILATERAL MYRINGOTOMY WITH VENTILATING  TUBE INSERTION performed by Oswald Crews MD at [address]      as child    TUNNELED VENOUS PORT PLACEMENT  2015    used for Iron infusions Social History     Socioeconomic History    Marital status:      Spouse name: Not on file    Number of children: Not on file    Years of education: Not on file    Highest education level: Not on file   Occupational History    Not on file   Tobacco Use    Smoking status: Some Days     Packs/day: 0.25     Years: 50.00     Pack years: 12.50     Types: Cigarettes    Smokeless tobacco: Never   Vaping Use    Vaping Use: Never used   Substance and Sexual Activity    Alcohol use: No    Drug use: No    Sexual activity: Not on file   Other Topics Concern    Not on file   Social History Narrative    Not on file     Social Determinants of Health     Financial Resource Strain: Low Risk     Difficulty of Paying Living Expenses: Not hard at all   Food Insecurity: No Food Insecurity    Worried About Running Out of Food in the Last Year: Never true    [address] [place] [address] Place of Food in the Last Year: Never true   Transportation Needs: Not on file   Physical Activity: Sufficiently Active    Days of Exercise per Week: 7 days    Minutes of Exercise per Session: 30 min   Stress: Not on file   Social Connections: Not on file   Intimate Partner Violence: Not on file   Housing Stability: Not on file     Family History   Problem Relation Age of Onset    Breast Cancer Mother     Diabetes Mother     Stroke Mother     High Blood Pressure Father     Heart Disease Father     High Cholesterol Father     Diabetes Father     Stroke Father     Breast Cancer Sister     Cancer Sister         oral cancer    Breast Cancer Sister     Cancer Sister         brain & lung cancer    Diabetes Brother     Kidney Disease Brother     Alcohol Abuse Brother         liver problems    Cancer Brother         liver & lung cancer    Other Son         PTSD / blood dyscrasia    Other Son         MVA at age 29     Allergies:  Diltiazem and Rivaroxaban    Review of Systems   Constitutional:  Negative for activity change, appetite change, diaphoresis and unexpected weight change. Eyes:  Negative for photophobia and visual disturbance. Respiratory:  Negative for chest tightness and shortness of breath. No orthopnea   Cardiovascular:  Negative for chest pain, palpitations and leg swelling. Gastrointestinal:  Negative for abdominal distention and abdominal pain. Genitourinary:  Negative for flank pain and frequency. Musculoskeletal:  Negative for gait problem and joint swelling. Neurological:  Negative for dizziness, weakness, light-headedness and headaches. Psychiatric/Behavioral:  Negative for confusion. Objective:   /64 (Site: Left Upper Arm, Position: Sitting, Cuff Size: Medium Adult)   Pulse 71   Ht 5' 1.5\" (1.562 m)   Wt 141 lb (64 kg)   SpO2 95%   BMI 26.21 kg/m²     Physical Exam  Constitutional:       General: She is not in acute distress. Appearance: She is well-developed. She is not diaphoretic. HENT:      Head: Normocephalic and atraumatic. Right Ear: External ear normal.      Left Ear: External ear normal.      Nose: Nose normal.   Eyes:      General:         Right eye: No discharge. Left eye: No discharge. Conjunctiva/sclera: Conjunctivae normal.      Pupils: Pupils are equal, round, and reactive to light. Neck:      Thyroid: No thyromegaly. Trachea: No tracheal deviation. Cardiovascular:      Rate and Rhythm: Normal rate and regular rhythm. Pulmonary:      Effort: Pulmonary effort is normal. No respiratory distress. Breath sounds: Wheezing present. Chest:      Comments: Pain on palpation of left ribs 8 through 10 with no deformity palpable and no ecchymosis  Abdominal:      General: There is no distension. Musculoskeletal:      Cervical back: Neck supple. Skin:     General: Skin is warm and dry. Findings: No bruising or rash. Neurological:      Mental Status: She is alert. Coordination: Coordination normal.   Psychiatric:         Thought Content:  Thought content normal. Judgment: Judgment normal.       No results found for this visit on 01/26/23.     Recent Results (from the past 2016 hour(s))   POCT glycosylated hemoglobin (Hb A1C)    Collection Time: 11/21/22  9:51 AM   Result Value Ref Range    Hemoglobin A1C 6.0 %   Rapid Influenza A/B Antigens    Collection Time: 11/23/22  8:48 AM    Specimen: Nasopharyngeal   Result Value Ref Range    Influenza A by PCR Negative     Influenza B by PCR Negative    COVID-19, Rapid    Collection Time: 11/23/22  8:48 AM    Specimen: Nasopharyngeal Swab   Result Value Ref Range    SARS-CoV-2, NAAT DETECTED (A) Not Detected   CBC with Auto Differential    Collection Time: 11/23/22  9:08 AM   Result Value Ref Range    WBC 8.5 4.8 - 10.8 K/uL    RBC 4.42 4.20 - 5.40 M/uL    Hemoglobin 12.7 12.0 - 16.0 g/dL    Hematocrit 37.9 37.0 - 47.0 %    MCV 85.8 79.4 - 94.8 fL    MCH 28.7 27.0 - 31.3 pg    MCHC 33.4 33.0 - 37.0 %    RDW 18.6 (H) 11.5 - 14.5 %    Platelets 921 241 - 717 K/uL    Neutrophils % 84.5 %    Lymphocytes % 5.4 %    Monocytes % 8.4 %    Eosinophils % 1.3 %    Basophils % 0.4 %    Neutrophils Absolute 7.2 (H) 1.4 - 6.5 K/uL    Lymphocytes Absolute 0.5 (L) 1.0 - 4.8 K/uL    Monocytes Absolute 0.7 0.2 - 0.8 K/uL    Eosinophils Absolute 0.1 0.0 - 0.7 K/uL    Basophils Absolute 0.0 0.0 - 0.2 K/uL   CMP    Collection Time: 11/23/22  9:08 AM   Result Value Ref Range    Sodium 140 135 - 144 mEq/L    Potassium 3.9 3.4 - 4.9 mEq/L    Chloride 102 95 - 107 mEq/L    CO2 26 20 - 31 mEq/L    Anion Gap 12 9 - 15 mEq/L    Glucose 168 (H) 70 - 99 mg/dL    BUN 16 8 - 23 mg/dL    Creatinine 1.06 (H) 0.50 - 0.90 mg/dL    Est, Glom Filt Rate 54.9 (L) >60    Calcium 9.6 8.5 - 9.9 mg/dL    Total Protein 6.9 6.3 - 8.0 g/dL    Albumin 4.2 3.5 - 4.6 g/dL    Total Bilirubin <0.2 0.2 - 0.7 mg/dL    Alkaline Phosphatase 93 40 - 130 U/L    ALT 12 0 - 33 U/L    AST 22 0 - 35 U/L    Globulin 2.7 2.3 - 3.5 g/dL   TSH with Reflex    Collection Time: 01/09/23 11:52 AM Result Value Ref Range    TSH 0.785 0.440 - 3.860 uIU/mL   Iron and TIBC    Collection Time: 01/09/23 11:52 AM   Result Value Ref Range    Iron 53 37 - 145 ug/dL    TIBC 333 250 - 450 ug/dL    Iron Saturation 16 (L) 20 - 55 %    UIBC 280 112 - 347 ug/dL   Microalbumin / Creatinine Urine Ratio    Collection Time: 01/09/23  6:57 PM   Result Value Ref Range    Microalbumin, Random Urine <1.20 Not Established mg/dL    Creatinine, Ur 56.1 Not Established mg/dL    Microalbumin Creatinine Ratio see below 0.0 - 30.0 mg/G       [] Pt was seen by provider for      Minutes  Counseling and coordination of care was done for all assessment diagnosis listed for today with patient and any family/friend present. More than 50% of this visit was spent coordinating current care, obtaining information for prior records, and counseling for current plan of action. Assessment:       Diagnosis Orders   1. Contusion of rib on left side, initial encounter  traMADol (ULTRAM) 50 MG tablet      2. Angina pectoris ([facility] [address]              No orders of the defined types were placed in this encounter. Orders Placed This Encounter   Medications    traMADol (ULTRAM) 50 MG tablet     Sig: Take 1 tablet by mouth every 6 hours as needed for Pain for up to 11 days. Intended supply: 7 days. Take lowest dose possible to manage pain Max Daily Amount: 200 mg     Dispense:  42 tablet     Refill:  0     Reduce doses taken as pain becomes manageable          Medication List            Accurate as of January 26, 2023  3:51 PM. If you have any questions, ask your nurse or doctor. START taking these medications      traMADol 50 MG tablet  Commonly known as: Ultram  Take 1 tablet by mouth every 6 hours as needed for Pain for up to 11 days. Intended supply: 7 days.  Take lowest dose possible to manage pain Max Daily Amount: 200 mg  Started by: Sunshine Navarrete MD            CONTINUE taking these medications      albuterol (2.5 MG/3ML) 0.083% nebulizer solution  Commonly known as: PROVENTIL     amLODIPine 5 MG tablet  Commonly known as: NORVASC     aspirin 81 MG tablet     atorvastatin 80 MG tablet  Commonly known as: LIPITOR     Compressor/Nebulizer Misc  1 inhalation by Does not apply route 4 times daily as needed (copd exac)     estradiol 0.1 MG/GM vaginal cream  Commonly known as: ESTRACE     fluticasone 50 MCG/ACT nasal spray  Commonly known as: FLONASE     fluticasone-umeclidin-vilant 100-62.5-25 MCG/ACT Aepb inhaler  Commonly known as: TRELEGY ELLIPTA     hydroCHLOROthiazide 12.5 MG tablet  Commonly known as: HYDRODIURIL     ibuprofen 600 MG tablet  Commonly known as: ADVIL;MOTRIN     lisinopril 10 MG tablet  Commonly known as: PRINIVIL;ZESTRIL     metFORMIN 1000 MG tablet  Commonly known as: GLUCOPHAGE  Take 1 tablet by mouth 2 times daily (with meals)     nitroGLYCERIN 0.4 MG/HR  Commonly known as: NITRODUR     promethazine-dextromethorphan 6.25-15 MG/5ML syrup  Commonly known as: PROMETHAZINE-DM     sotalol 80 MG tablet  Commonly known as: BETAPACE     vitamin B-12 100 MCG tablet  Commonly known as: CYANOCOBALAMIN  Take 1 tablet by mouth daily            STOP taking these medications      meloxicam 7.5 MG tablet  Commonly known as: MOBIC  Stopped by: Tripp Bhagat MD               Where to Get Your Medications        These medications were sent to [address] [place] [address]      Phone: [phone]   traMADol 50 MG tablet           Plan:   Return if symptoms worsen or fail to improve. Patient Instructions   Educated patient on deep breathing exercises at least once an hour to help aerate the lungs and prevent pneumonia. Declines interest in x-ray at this time. No current angina symptoms. This note was partially created with the assistance of dictation. This may lead to grammatical or spelling errors.       Noé MORIN Benjamin Correa M.D.

## 2023-01-30 ENCOUNTER — TELEPHONE (OUTPATIENT)
Dept: FAMILY MEDICINE CLINIC | Age: 73
End: 2023-01-30

## 2023-01-30 DIAGNOSIS — S20.212A CONTUSION OF RIB ON LEFT SIDE, INITIAL ENCOUNTER: ICD-10-CM

## 2023-01-30 RX ORDER — TRAMADOL HYDROCHLORIDE 50 MG/1
50 TABLET ORAL EVERY 6 HOURS PRN
Qty: 42 TABLET | Refills: 0 | Status: SHIPPED | OUTPATIENT
Start: 2023-01-30 | End: 2023-02-10

## 2023-01-30 NOTE — TELEPHONE ENCOUNTER
2 sigs on medication pharmacy asking for clarification please adjust and resend. Need to know 11day supply or 7 day supply?

## 2023-02-17 NOTE — TELEPHONE ENCOUNTER
Requesting medication refill.  Please approve or deny this request.    Rx requested:  Requested Prescriptions     Pending Prescriptions Disp Refills    metFORMIN (GLUCOPHAGE) 1000 MG tablet [Pharmacy Med Name: metFORMIN HCl 1000 MG Oral Tablet] 60 tablet 11     Sig: TAKE 1 TABLET TWICE A DAY WITH  MEALS       Last Office Visit:   1/26/2023    Next Visit Date:  Future Appointments   Date Time Provider Frederick Mackey   5/22/2023 11:00 AM Jennifer Morton MD [facility]

## 2023-06-01 ENCOUNTER — OFFICE VISIT (OUTPATIENT)
Dept: FAMILY MEDICINE CLINIC | Age: 73
End: 2023-06-01
Payer: MEDICARE

## 2023-06-01 VITALS
OXYGEN SATURATION: 97 % | WEIGHT: 141 LBS | BODY MASS INDEX: 25.95 KG/M2 | HEIGHT: 62 IN | DIASTOLIC BLOOD PRESSURE: 64 MMHG | SYSTOLIC BLOOD PRESSURE: 102 MMHG | TEMPERATURE: 97.9 F | HEART RATE: 75 BPM

## 2023-06-01 DIAGNOSIS — D50.8 OTHER IRON DEFICIENCY ANEMIA: ICD-10-CM

## 2023-06-01 DIAGNOSIS — I48.91 ATRIAL FIBRILLATION, UNSPECIFIED TYPE (HCC): ICD-10-CM

## 2023-06-01 DIAGNOSIS — E11.59 TYPE 2 DIABETES MELLITUS WITH OTHER CIRCULATORY COMPLICATION, WITHOUT LONG-TERM CURRENT USE OF INSULIN (HCC): Primary | ICD-10-CM

## 2023-06-01 DIAGNOSIS — J43.9 PULMONARY EMPHYSEMA, UNSPECIFIED EMPHYSEMA TYPE (HCC): ICD-10-CM

## 2023-06-01 DIAGNOSIS — I73.9 PERIPHERAL VASCULAR DISEASE (HCC): ICD-10-CM

## 2023-06-01 DIAGNOSIS — I42.9 CARDIOMYOPATHY, UNSPECIFIED TYPE (HCC): ICD-10-CM

## 2023-06-01 PROBLEM — I50.20 HEART FAILURE WITH REDUCED EJECTION FRACTION (HCC): Status: ACTIVE | Noted: 2023-06-01

## 2023-06-01 LAB — HBA1C MFR BLD: 5.6 %

## 2023-06-01 PROCEDURE — 1123F ACP DISCUSS/DSCN MKR DOCD: CPT | Performed by: FAMILY MEDICINE

## 2023-06-01 PROCEDURE — 3074F SYST BP LT 130 MM HG: CPT | Performed by: FAMILY MEDICINE

## 2023-06-01 PROCEDURE — 99214 OFFICE O/P EST MOD 30 MIN: CPT | Performed by: FAMILY MEDICINE

## 2023-06-01 PROCEDURE — 3017F COLORECTAL CA SCREEN DOC REV: CPT | Performed by: FAMILY MEDICINE

## 2023-06-01 PROCEDURE — 2022F DILAT RTA XM EVC RTNOPTHY: CPT | Performed by: FAMILY MEDICINE

## 2023-06-01 PROCEDURE — G8427 DOCREV CUR MEDS BY ELIG CLIN: HCPCS | Performed by: FAMILY MEDICINE

## 2023-06-01 PROCEDURE — G8400 PT W/DXA NO RESULTS DOC: HCPCS | Performed by: FAMILY MEDICINE

## 2023-06-01 PROCEDURE — G8417 CALC BMI ABV UP PARAM F/U: HCPCS | Performed by: FAMILY MEDICINE

## 2023-06-01 PROCEDURE — 1090F PRES/ABSN URINE INCON ASSESS: CPT | Performed by: FAMILY MEDICINE

## 2023-06-01 PROCEDURE — 4004F PT TOBACCO SCREEN RCVD TLK: CPT | Performed by: FAMILY MEDICINE

## 2023-06-01 PROCEDURE — 3078F DIAST BP <80 MM HG: CPT | Performed by: FAMILY MEDICINE

## 2023-06-01 PROCEDURE — 3044F HG A1C LEVEL LT 7.0%: CPT | Performed by: FAMILY MEDICINE

## 2023-06-01 PROCEDURE — 3023F SPIROM DOC REV: CPT | Performed by: FAMILY MEDICINE

## 2023-06-01 PROCEDURE — 83036 HEMOGLOBIN GLYCOSYLATED A1C: CPT | Performed by: FAMILY MEDICINE

## 2023-06-01 RX ORDER — ZINC GLUCONATE 50 MG
50 TABLET ORAL DAILY
COMMUNITY

## 2023-06-01 SDOH — ECONOMIC STABILITY: HOUSING INSECURITY
IN THE LAST 12 MONTHS, WAS THERE A TIME WHEN YOU DID NOT HAVE A STEADY PLACE TO SLEEP OR SLEPT IN A SHELTER (INCLUDING NOW)?: NO

## 2023-06-01 SDOH — ECONOMIC STABILITY: FOOD INSECURITY: WITHIN THE PAST 12 MONTHS, THE FOOD YOU BOUGHT JUST DIDN'T LAST AND YOU DIDN'T HAVE MONEY TO GET MORE.: NEVER TRUE

## 2023-06-01 SDOH — ECONOMIC STABILITY: INCOME INSECURITY: HOW HARD IS IT FOR YOU TO PAY FOR THE VERY BASICS LIKE FOOD, HOUSING, MEDICAL CARE, AND HEATING?: NOT HARD AT ALL

## 2023-06-01 SDOH — ECONOMIC STABILITY: FOOD INSECURITY: WITHIN THE PAST 12 MONTHS, YOU WORRIED THAT YOUR FOOD WOULD RUN OUT BEFORE YOU GOT MONEY TO BUY MORE.: NEVER TRUE

## 2023-06-01 ASSESSMENT — ENCOUNTER SYMPTOMS
ABDOMINAL PAIN: 0
SHORTNESS OF BREATH: 0
PHOTOPHOBIA: 0
CHEST TIGHTNESS: 0
ABDOMINAL DISTENTION: 0

## 2023-06-01 NOTE — PATIENT INSTRUCTIONS
Continue no tobacco intake, congratulations on over 1 year no smoking    No change in diabetic meds. Well controlled    History of cardiomyopathy consistent with murmur and pt sees cardiology    Continue with iron infusions for iron deficiency and intolerance or oral iron    No change in respiratory meds. Pt see pulmonary.

## 2023-06-01 NOTE — PROGRESS NOTES
Diagnosis Orders   1. Type 2 diabetes mellitus with other circulatory complication, without long-term current use of insulin (HCC)  POCT glycosylated hemoglobin (Hb A1C)     DIABETES FOOT EXAM      2. Peripheral vascular disease ([facility] [address]        3. Atrial fibrillation, unspecified type ([facility] [address]        4. Pulmonary emphysema, unspecified emphysema type (HCC)        5. Other iron deficiency anemia        6. Cardiomyopathy, unspecified type ([facility] [address]          Return for 1/14/2024 MAW and lab review. Patient Instructions   Continue no tobacco intake, congratulations on over 1 year no smoking    No change in diabetic meds. Well controlled    History of cardiomyopathy consistent with murmur and pt sees cardiology    Continue with iron infusions for iron deficiency and intolerance or oral iron    No change in respiratory meds. Pt see pulmonary. Subjective:      Patient ID: Mary Sethi is a 74 y.o. female who presents for:  Chief Complaint   Patient presents with    Diabetes       Last 2 visits pulled up in side bar        Patient is here for follow-up of type 2 diabetes. Feels like things are going pretty well. She does have a cardiologist and pulmonologist that she sees routinely and she is due for follow-ups with both of them coming up here soon. Feels no need for medication changes.   Season has been going well as far as her breathing      Current Outpatient Medications on File Prior to Visit   Medication Sig Dispense Refill    zinc 50 MG TABS tablet Take 1 tablet by mouth daily      metFORMIN (GLUCOPHAGE) 1000 MG tablet TAKE 1 TABLET TWICE A DAY WITH  MEALS 60 tablet 11    fluticasone-umeclidin-vilant (TRELEGY ELLIPTA) 100-62.5-25 MCG/ACT AEPB inhaler Inhale 1 puff into the lungs      hydroCHLOROthiazide (HYDRODIURIL) 12.5 MG tablet       Nebulizers (COMPRESSOR/NEBULIZER) MISC 1 inhalation by Does not apply route 4 times daily as needed (copd exac) 1 each 3    vitamin B-12 (CYANOCOBALAMIN) 100 MCG tablet Take 1

## 2023-07-27 ENCOUNTER — TELEPHONE (OUTPATIENT)
Dept: FAMILY MEDICINE CLINIC | Age: 73
End: 2023-07-27

## 2023-07-27 NOTE — TELEPHONE ENCOUNTER
FYI-  Pt calling to state that her heart rate is 124-150 and her BP before phone call was 95/46, then it was 81/46 while on the phone pt was advised to go to the ER.

## 2023-08-15 ENCOUNTER — OFFICE VISIT (OUTPATIENT)
Dept: FAMILY MEDICINE CLINIC | Age: 73
End: 2023-08-15
Payer: MEDICARE

## 2023-08-15 VITALS
TEMPERATURE: 98.7 F | BODY MASS INDEX: 24.25 KG/M2 | DIASTOLIC BLOOD PRESSURE: 62 MMHG | WEIGHT: 131.8 LBS | OXYGEN SATURATION: 95 % | HEART RATE: 80 BPM | SYSTOLIC BLOOD PRESSURE: 112 MMHG | HEIGHT: 62 IN

## 2023-08-15 DIAGNOSIS — S69.92XD INJURY OF LEFT HAND, SUBSEQUENT ENCOUNTER: Primary | ICD-10-CM

## 2023-08-15 DIAGNOSIS — T14.8XXA ABRASION: ICD-10-CM

## 2023-08-15 PROCEDURE — 99214 OFFICE O/P EST MOD 30 MIN: CPT | Performed by: FAMILY MEDICINE

## 2023-08-15 PROCEDURE — G8427 DOCREV CUR MEDS BY ELIG CLIN: HCPCS | Performed by: FAMILY MEDICINE

## 2023-08-15 PROCEDURE — 1123F ACP DISCUSS/DSCN MKR DOCD: CPT | Performed by: FAMILY MEDICINE

## 2023-08-15 PROCEDURE — 1090F PRES/ABSN URINE INCON ASSESS: CPT | Performed by: FAMILY MEDICINE

## 2023-08-15 PROCEDURE — 3078F DIAST BP <80 MM HG: CPT | Performed by: FAMILY MEDICINE

## 2023-08-15 PROCEDURE — G8420 CALC BMI NORM PARAMETERS: HCPCS | Performed by: FAMILY MEDICINE

## 2023-08-15 PROCEDURE — 3074F SYST BP LT 130 MM HG: CPT | Performed by: FAMILY MEDICINE

## 2023-08-15 PROCEDURE — 3017F COLORECTAL CA SCREEN DOC REV: CPT | Performed by: FAMILY MEDICINE

## 2023-08-15 PROCEDURE — G8400 PT W/DXA NO RESULTS DOC: HCPCS | Performed by: FAMILY MEDICINE

## 2023-08-15 PROCEDURE — 4004F PT TOBACCO SCREEN RCVD TLK: CPT | Performed by: FAMILY MEDICINE

## 2023-08-15 RX ORDER — HYDROCODONE BITARTRATE AND ACETAMINOPHEN 5; 325 MG/1; MG/1
TABLET ORAL
COMMUNITY
Start: 2023-08-10

## 2023-08-15 ASSESSMENT — ENCOUNTER SYMPTOMS
CHEST TIGHTNESS: 0
ABDOMINAL DISTENTION: 0
ABDOMINAL PAIN: 0
PHOTOPHOBIA: 0
SHORTNESS OF BREATH: 0

## 2023-08-15 NOTE — PATIENT INSTRUCTIONS
Cued patient in keeping hand elevated and using range of motion of fingers to help get rid of swelling.

## 2023-08-15 NOTE — PROGRESS NOTES
Thought Content: Thought content normal.         Judgment: Judgment normal.       No results found for this visit on 08/15/23. Recent Results (from the past 2016 hour(s))   POCT glycosylated hemoglobin (Hb A1C)    Collection Time: 06/01/23  2:52 PM   Result Value Ref Range    Hemoglobin A1C 5.6 %       [] Pt was seen by provider for      Minutes  Counseling and coordination of care was done for all assessment diagnosis listed for today with patient and any family/friend present. More than 50% of this visit was spent coordinating current care, obtaining information for prior records, and counseling for current plan of action. Assessment:       Diagnosis Orders   1. Injury of left hand, subsequent encounter        2. Abrasion              No orders of the defined types were placed in this encounter. No orders of the defined types were placed in this encounter. Medication List            Accurate as of August 15, 2023  3:38 PM. If you have any questions, ask your nurse or doctor.                 CONTINUE taking these medications      albuterol (2.5 MG/3ML) 0.083% nebulizer solution  Commonly known as: PROVENTIL     amLODIPine 5 MG tablet  Commonly known as: NORVASC     aspirin 81 MG tablet     atorvastatin 80 MG tablet  Commonly known as: LIPITOR     Compressor/Nebulizer Misc  1 inhalation by Does not apply route 4 times daily as needed (copd exac)     estradiol 0.1 MG/GM vaginal cream  Commonly known as: ESTRACE     fluticasone 50 MCG/ACT nasal spray  Commonly known as: FLONASE     fluticasone-umeclidin-vilant 100-62.5-25 MCG/ACT Aepb inhaler  Commonly known as: TRELEGY ELLIPTA     hydroCHLOROthiazide 12.5 MG tablet  Commonly known as: HYDRODIURIL     HYDROcodone-acetaminophen 5-325 MG per tablet  Commonly known as: NORCO     ibuprofen 600 MG tablet  Commonly known as: ADVIL;MOTRIN     lisinopril 10 MG tablet  Commonly known as: PRINIVIL;ZESTRIL     metFORMIN 1000 MG tablet  Commonly known

## 2023-09-05 ENCOUNTER — OFFICE VISIT (OUTPATIENT)
Dept: FAMILY MEDICINE CLINIC | Age: 73
End: 2023-09-05
Payer: MEDICARE

## 2023-09-05 VITALS
TEMPERATURE: 97.7 F | OXYGEN SATURATION: 96 % | HEART RATE: 73 BPM | WEIGHT: 132.6 LBS | HEIGHT: 62 IN | SYSTOLIC BLOOD PRESSURE: 136 MMHG | BODY MASS INDEX: 24.4 KG/M2 | DIASTOLIC BLOOD PRESSURE: 60 MMHG

## 2023-09-05 DIAGNOSIS — S69.92XS INJURY OF LEFT WRIST, SEQUELA: Primary | ICD-10-CM

## 2023-09-05 PROBLEM — S69.92XA INJURY OF LEFT WRIST: Status: ACTIVE | Noted: 2023-09-05

## 2023-09-05 PROCEDURE — 1123F ACP DISCUSS/DSCN MKR DOCD: CPT

## 2023-09-05 PROCEDURE — 3017F COLORECTAL CA SCREEN DOC REV: CPT

## 2023-09-05 PROCEDURE — 3075F SYST BP GE 130 - 139MM HG: CPT

## 2023-09-05 PROCEDURE — G8420 CALC BMI NORM PARAMETERS: HCPCS

## 2023-09-05 PROCEDURE — 99213 OFFICE O/P EST LOW 20 MIN: CPT

## 2023-09-05 PROCEDURE — 3078F DIAST BP <80 MM HG: CPT

## 2023-09-05 PROCEDURE — 4004F PT TOBACCO SCREEN RCVD TLK: CPT

## 2023-09-05 PROCEDURE — G8400 PT W/DXA NO RESULTS DOC: HCPCS

## 2023-09-05 PROCEDURE — G8427 DOCREV CUR MEDS BY ELIG CLIN: HCPCS

## 2023-09-05 PROCEDURE — 1090F PRES/ABSN URINE INCON ASSESS: CPT

## 2023-09-05 RX ORDER — IBUPROFEN 600 MG/1
600 TABLET ORAL 3 TIMES DAILY PRN
Qty: 30 TABLET | Refills: 0 | Status: SHIPPED | OUTPATIENT
Start: 2023-09-05

## 2023-09-05 ASSESSMENT — ENCOUNTER SYMPTOMS: COLOR CHANGE: 0

## 2023-09-05 NOTE — PROGRESS NOTES
[facility] Encounter    SUBJECTIVE    CHIEF COMPLAINT:   Chief Complaint   Patient presents with    Hand Pain     Has continued to have left hand pain since injuring 8/3/23. States she did have xrays done at [facility] on 8/3/23 & f/u w/PCP Dr. Amadeo Grissom 8/15/23. Has continued to wear brace, use ice, exercises as directed & elevate with no relief. Continues to have burning pain, swelling & not able to move fingers. Has not been taking any pain relievers other than her daily once baby ASA. HPI:  Luke Delgado is a 74 y.o. female who presents as an established patient to the walk-in clinic today for:     Left hand pain ongoing since initial injury 8/3/23 where she was seen in ED with negative xrays. Has been wearing a brace daily and had subsequent appt with PCP. States swelling and pain has not subsided. Is not taking anything for pain.     Past Medical History:   Diagnosis Date    Arthritis     Breast cancer ([address]) 1989    right breast    CAD (coronary artery disease)     cardiac stents x 3    Cancer ([address])     right breast    COPD (chronic obstructive pulmonary disease) ([address])     smoker since age 10    Diabetes mellitus ([address])     hx > 30 yrs    GERD (gastroesophageal reflux disease)     History of blood transfusion 07/2019    blood transfusions x 3 due to lower GI bleed / [facility].    Hx antineoplastic chemo 1989    right breast cancer    Hyperlipidemia     meds > 10 yrs    Hypertension     meds > 10 yrs    PVD (peripheral vascular disease) ([address])     both legs       Current Outpatient Medications on File Prior to Visit   Medication Sig Dispense Refill    zinc 50 MG TABS tablet Take 1 tablet by mouth daily      metFORMIN (GLUCOPHAGE) 1000 MG tablet TAKE 1 TABLET TWICE A DAY WITH  MEALS 60 tablet 11    fluticasone-umeclidin-vilant (TRELEGY ELLIPTA) 100-62.5-25 MCG/ACT AEPB inhaler Inhale 1 puff into the lungs      Nebulizers (COMPRESSOR/NEBULIZER) MISC 1 inhalation by

## 2023-09-12 ENCOUNTER — OFFICE VISIT (OUTPATIENT)
Dept: ORTHOPEDIC SURGERY | Age: 73
End: 2023-09-12

## 2023-09-12 VITALS
HEIGHT: 62 IN | BODY MASS INDEX: 25.4 KG/M2 | TEMPERATURE: 97.7 F | HEART RATE: 65 BPM | WEIGHT: 138 LBS | OXYGEN SATURATION: 98 %

## 2023-09-12 DIAGNOSIS — S63.502D SPRAIN OF LEFT WRIST, SUBSEQUENT ENCOUNTER: Primary | ICD-10-CM

## 2023-09-12 RX ORDER — TRIAMCINOLONE ACETONIDE 40 MG/ML
40 INJECTION, SUSPENSION INTRA-ARTICULAR; INTRAMUSCULAR ONCE
Status: COMPLETED | OUTPATIENT
Start: 2023-09-12 | End: 2023-09-12

## 2023-09-12 RX ORDER — LIDOCAINE HYDROCHLORIDE 10 MG/ML
1 INJECTION, SOLUTION INFILTRATION; PERINEURAL ONCE
Status: COMPLETED | OUTPATIENT
Start: 2023-09-12 | End: 2023-09-12

## 2023-09-12 RX ADMIN — TRIAMCINOLONE ACETONIDE 40 MG: 40 INJECTION, SUSPENSION INTRA-ARTICULAR; INTRAMUSCULAR at 11:16

## 2023-09-12 RX ADMIN — LIDOCAINE HYDROCHLORIDE 1 ML: 10 INJECTION, SOLUTION INFILTRATION; PERINEURAL at 11:16

## 2023-09-12 ASSESSMENT — ENCOUNTER SYMPTOMS
GASTROINTESTINAL NEGATIVE: 1
RESPIRATORY NEGATIVE: 1
EYES NEGATIVE: 1
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
## 2023-09-13 ENCOUNTER — TELEPHONE (OUTPATIENT)
Dept: ORTHOPEDIC SURGERY | Age: 73
End: 2023-09-13

## 2023-09-13 ENCOUNTER — TELEPHONE (OUTPATIENT)
Dept: FAMILY MEDICINE CLINIC | Age: 73
End: 2023-09-13

## 2023-09-13 DIAGNOSIS — S63.502D SPRAIN OF LEFT WRIST, SUBSEQUENT ENCOUNTER: Primary | ICD-10-CM

## 2023-09-13 NOTE — TELEPHONE ENCOUNTER
[address] calling stating they are sending a form that needs to be filled out by the provider and faxed back A compliance form

## 2023-09-25 ENCOUNTER — HOSPITAL ENCOUNTER (OUTPATIENT)
Dept: PHYSICAL THERAPY | Age: 73
Setting detail: THERAPIES SERIES
Discharge: HOME | End: 2023-09-25
Payer: MEDICARE

## 2023-09-25 PROCEDURE — 97110 THERAPEUTIC EXERCISES: CPT

## 2023-09-25 PROCEDURE — 97161 PT EVAL LOW COMPLEX 20 MIN: CPT

## 2023-09-25 ASSESSMENT — PAIN SCALES - GENERAL: PAINLEVEL_OUTOF10: 0

## 2023-09-25 NOTE — PLAN OF CARE
PHYSICAL THERAPY PLAN OF CARE   [facility]      [address] [facility], [address] [place] [address]     Ph: [phone] Fax: [phone]      [x] Certification  [] Recertification [x]  Plan of Care  [] Progress Note [] Discharge      Referring Provider: KELLIE Pederson      From:  Michel Knox, PT   Patient: Sae Fonseca (88 y.o. female) : 1950 Date: 2023   Medical Diagnosis: Unspecified sprain of left wrist, subsequent encounter [S63.502D] Sprain of left wrist  Treatment Diagnosis: wrist pain, decreased Lt UE strength    Plan of Care/Certification Expiration Date: : 10/25/23   Progress Report Period from:  2023  to 2023    Visits to Date: 1 No Show: 0 Cancelled Appts: 0    OBJECTIVE:   Short Term Goals - Time Frame for Short Term Goals: 2 wks    Goals Current/Discharge status  Status   Short Term Goal 1: Independent with HEP to promote home management of symptoms  Need for HEP and education  New   Short Term Goal 2: Pt will report no noticeable difference in strength between Lt and Rt UE to be able to perform all usual tasks, including gardening, without difficulty or modifications  L Shoulder Flexion: 4+/5  L Shoulder Extension: 4+/5  L Shoulder Internal Rotation: 4+/5  L Shoulder External Rotation: 4/5  L Elbow Flexion: 4+/5  L Elbow Extension: 4+/5  L Forearm Pron: 4+/5  L Forearm Sup: 4+/5  L Wrist Flexion: 4+/5  L Wrist Extension: 4/5  L Wrist Radial Deviation: 4/5  L Wrist Ulnar Deviation: 4+/5    Strength RUE  R Shoulder Flexion: 4+/5  R Shoulder Extension: 4+/5  R Shoulder Internal Rotation: 4+/5  R Shoulder External Rotation: 4+/5  R Elbow Flexion: 5/5  R Elbow Extension: 5/5  R Forearm Pron: 4+/5  R Forearm Sup: 4+/5  R Wrist Flexion: 4+/5  R Wrist Extension: 4+/5  R Wrist Radial Deviation: 4+/5  R Wrist Ulnar Deviation: 4+/5 New     Long Term Goals - Time Frame for Long Term Goals : = STGs    Body Structures, Functions, Activity Limitations

## 2023-09-25 NOTE — PROGRESS NOTES
[facility]) Physical Therapy-  [facility]   PHYSICAL THERAPY EVALUATION      Physical Therapy: Initial Evaluation    Patient: Lottie Nurse (69 y.o.     female)   Examination Date:   Plan of Care Certification Period: 2023 to 10/25/23  Progress Note Counter:    :  1950 ;    Confirmed: Yes MRN: [id number]  CSN: [id number]   Insurance: Payor: MEDICARE / Plan: MEDICARE PART A AND B / Product Type: *No Product type* /   Insurance ID: [id number] - (Medicare)  PT Insurance Information: Medicare  Secondary Insurance (if applicable): Leoncio Olea   Referring Physician: KELLIE Cook     PCP: Jhonatan Trejo MD Visits to Date/Visits Approved:     No Show/Cancelled Appts: 0      Medical Diagnosis: Unspecified sprain of left wrist, subsequent encounter [J76.887H] Sprain of left wrist  Treatment Diagnosis: wrist pain, decreased Lt UE strength     PERTINENT MEDICAL HISTORY           Medical History:     Past Medical History:   Diagnosis Date    Arthritis     Breast cancer ([address])     right breast    CAD (coronary artery disease)     cardiac stents x 3    Cancer ([address])     right breast    COPD (chronic obstructive pulmonary disease) ([address])     smoker since age 10    Diabetes mellitus ([address])     hx > 30 yrs    GERD (gastroesophageal reflux disease)     History of blood transfusion 2019    blood transfusions x 3 due to lower GI bleed / [facility].    Hx antineoplastic chemo     right breast cancer    Hyperlipidemia     meds > 10 yrs    Hypertension     meds > 10 yrs    PVD (peripheral vascular disease) ([address])     both legs     Surgical History:   Past Surgical History:   Procedure Laterality Date    BREAST SURGERY Right     mastectomy due to malignancy / chemo to follow    CARDIAC CATHETERIZATION      COLONOSCOPY  2016    Tuan Liriano MD    CORONARY ANGIOPLASTY WITH STENT PLACEMENT      cardiac stents x 3    ENDOSCOPY, COLON, DIAGNOSTIC      EYE

## 2023-10-06 ENCOUNTER — OFFICE VISIT (OUTPATIENT)
Dept: ORTHOPEDIC SURGERY | Age: 73
End: 2023-10-06
Payer: MEDICARE

## 2023-10-06 VITALS
HEIGHT: 62 IN | BODY MASS INDEX: 25.4 KG/M2 | OXYGEN SATURATION: 97 % | WEIGHT: 138 LBS | HEART RATE: 63 BPM | TEMPERATURE: 98.3 F

## 2023-10-06 DIAGNOSIS — S63.502D SPRAIN OF LEFT WRIST, SUBSEQUENT ENCOUNTER: Primary | ICD-10-CM

## 2023-10-06 PROCEDURE — G8427 DOCREV CUR MEDS BY ELIG CLIN: HCPCS | Performed by: PHYSICIAN ASSISTANT

## 2023-10-06 PROCEDURE — 1090F PRES/ABSN URINE INCON ASSESS: CPT | Performed by: PHYSICIAN ASSISTANT

## 2023-10-06 PROCEDURE — G8400 PT W/DXA NO RESULTS DOC: HCPCS | Performed by: PHYSICIAN ASSISTANT

## 2023-10-06 PROCEDURE — 3017F COLORECTAL CA SCREEN DOC REV: CPT | Performed by: PHYSICIAN ASSISTANT

## 2023-10-06 PROCEDURE — 99213 OFFICE O/P EST LOW 20 MIN: CPT | Performed by: PHYSICIAN ASSISTANT

## 2023-10-06 PROCEDURE — G8484 FLU IMMUNIZE NO ADMIN: HCPCS | Performed by: PHYSICIAN ASSISTANT

## 2023-10-06 PROCEDURE — 1123F ACP DISCUSS/DSCN MKR DOCD: CPT | Performed by: PHYSICIAN ASSISTANT

## 2023-10-06 PROCEDURE — G8417 CALC BMI ABV UP PARAM F/U: HCPCS | Performed by: PHYSICIAN ASSISTANT

## 2023-10-06 PROCEDURE — 4004F PT TOBACCO SCREEN RCVD TLK: CPT | Performed by: PHYSICIAN ASSISTANT

## 2023-10-06 ASSESSMENT — ENCOUNTER SYMPTOMS
EYES NEGATIVE: 1
GASTROINTESTINAL NEGATIVE: 1
RESPIRATORY NEGATIVE: 1

## 2023-10-06 NOTE — PROGRESS NOTES
10/6/2023 I have spent 25 minutes reviewing previous notes, test results and face to face with the patient discussing the diagnosis and importance of compliance with the treatment plan as well as documenting on the day of the visit. An electronic signature was used to authenticate this note.     --KELLIE Enamorado

## 2023-10-30 ENCOUNTER — TELEPHONE (OUTPATIENT)
Dept: FAMILY MEDICINE CLINIC | Age: 73
End: 2023-10-30

## 2023-10-30 NOTE — TELEPHONE ENCOUNTER
Dr Thierry Olivier sent a fax to medical supply company for ankle brace for the patient. The supply company needs her signature for  length of need, minimum of 6 mo or 99 as lifetime. Need Patient chart notes from last visit    . Faxed to [phone]  Send as [employer].

## 2023-11-16 DIAGNOSIS — E78.5 HYPERLIPIDEMIA, UNSPECIFIED HYPERLIPIDEMIA TYPE: ICD-10-CM

## 2023-11-16 PROBLEM — E11.9 DIABETES MELLITUS (MULTI): Status: ACTIVE | Noted: 2023-11-16

## 2023-11-16 PROBLEM — F17.200 CURRENT SMOKER: Status: ACTIVE | Noted: 2023-11-16

## 2023-11-16 PROBLEM — S62.628A: Status: ACTIVE | Noted: 2023-11-16

## 2023-11-16 PROBLEM — I10 ESSENTIAL HYPERTENSION: Status: ACTIVE | Noted: 2023-11-16

## 2023-11-16 PROBLEM — I48.0 PAROXYSMAL ATRIAL FIBRILLATION (MULTI): Status: ACTIVE | Noted: 2023-11-16

## 2023-11-16 PROBLEM — Z98.61 HISTORY OF PTCA: Status: ACTIVE | Noted: 2023-11-16

## 2023-11-16 PROBLEM — I65.22 LEFT CAROTID STENOSIS: Status: ACTIVE | Noted: 2023-11-16

## 2023-11-16 PROBLEM — I25.10 CORONARY ARTERY DISEASE, OCCLUSIVE: Status: ACTIVE | Noted: 2023-11-16

## 2023-11-16 PROBLEM — S62.648A: Status: ACTIVE | Noted: 2023-11-16

## 2023-11-16 PROBLEM — R09.89 BRUIT OF RIGHT CAROTID ARTERY: Status: ACTIVE | Noted: 2023-11-16

## 2023-11-16 RX ORDER — AMIODARONE HYDROCHLORIDE 200 MG/1
1 TABLET ORAL DAILY
COMMUNITY
End: 2024-01-02 | Stop reason: SDUPTHER

## 2023-11-16 RX ORDER — ASPIRIN 325 MG
1 TABLET, DELAYED RELEASE (ENTERIC COATED) ORAL DAILY
COMMUNITY

## 2023-11-16 RX ORDER — NITROGLYCERIN 0.4 MG/1
TABLET SUBLINGUAL
COMMUNITY

## 2023-11-16 RX ORDER — IBUPROFEN 600 MG/1
600 TABLET ORAL EVERY 6 HOURS PRN
COMMUNITY

## 2023-11-16 RX ORDER — ATORVASTATIN CALCIUM 80 MG/1
1 TABLET, FILM COATED ORAL DAILY
COMMUNITY
End: 2024-01-02

## 2023-11-16 RX ORDER — ATORVASTATIN CALCIUM 80 MG/1
80 TABLET, FILM COATED ORAL DAILY
Qty: 90 TABLET | Refills: 0 | Status: SHIPPED | OUTPATIENT
Start: 2023-11-16 | End: 2024-01-02 | Stop reason: SDUPTHER

## 2023-11-16 RX ORDER — METFORMIN HYDROCHLORIDE 1000 MG/1
1 TABLET ORAL
COMMUNITY

## 2023-11-16 RX ORDER — ALBUTEROL SULFATE 90 UG/1
AEROSOL, METERED RESPIRATORY (INHALATION)
COMMUNITY

## 2023-11-30 NOTE — PATIENT INSTRUCTIONS
Educated patient on deep breathing exercises at least once an hour to help aerate the lungs and prevent pneumonia. Declines interest in x-ray at this time. No current angina symptoms. Quality 431: Preventive Care And Screening: Unhealthy Alcohol Use - Screening: Patient not identified as an unhealthy alcohol user when screened for unhealthy alcohol use using a systematic screening method Quality 130: Documentation Of Current Medications In The Medical Record: Current Medications Documented Detail Level: Detailed Quality 226: Preventive Care And Screening: Tobacco Use: Screening And Cessation Intervention: Patient screened for tobacco use and is an ex/non-smoker

## 2023-12-29 ENCOUNTER — TELEPHONE (OUTPATIENT)
Dept: CARDIOLOGY | Facility: CLINIC | Age: 73
End: 2023-12-29
Payer: MEDICARE

## 2023-12-29 DIAGNOSIS — Z79.899 HIGH RISK MEDICATION USE: ICD-10-CM

## 2023-12-29 DIAGNOSIS — I48.0 PAROXYSMAL ATRIAL FIBRILLATION (MULTI): ICD-10-CM

## 2023-12-29 NOTE — TELEPHONE ENCOUNTER
Amiodarone testing orders sent to [facility] due in January.  PFT paper order also completed.

## 2024-01-02 ENCOUNTER — OFFICE VISIT (OUTPATIENT)
Dept: CARDIOLOGY | Facility: CLINIC | Age: 74
End: 2024-01-02
Payer: MEDICARE

## 2024-01-02 VITALS
WEIGHT: 140 LBS | HEIGHT: 61 IN | DIASTOLIC BLOOD PRESSURE: 62 MMHG | SYSTOLIC BLOOD PRESSURE: 142 MMHG | HEART RATE: 57 BPM | BODY MASS INDEX: 26.43 KG/M2

## 2024-01-02 DIAGNOSIS — Z87.891 FORMER SMOKER: ICD-10-CM

## 2024-01-02 DIAGNOSIS — I25.10 CORONARY ARTERY DISEASE, OCCLUSIVE: Primary | ICD-10-CM

## 2024-01-02 DIAGNOSIS — E78.5 HYPERLIPIDEMIA, UNSPECIFIED HYPERLIPIDEMIA TYPE: ICD-10-CM

## 2024-01-02 DIAGNOSIS — I10 ESSENTIAL HYPERTENSION: ICD-10-CM

## 2024-01-02 DIAGNOSIS — E78.2 MIXED HYPERLIPIDEMIA: ICD-10-CM

## 2024-01-02 DIAGNOSIS — I48.0 PAROXYSMAL ATRIAL FIBRILLATION (MULTI): ICD-10-CM

## 2024-01-02 DIAGNOSIS — Z98.61 HISTORY OF PTCA: ICD-10-CM

## 2024-01-02 PROCEDURE — 1036F TOBACCO NON-USER: CPT | Performed by: INTERNAL MEDICINE

## 2024-01-02 PROCEDURE — 1159F MED LIST DOCD IN RCRD: CPT | Performed by: INTERNAL MEDICINE

## 2024-01-02 PROCEDURE — 99214 OFFICE O/P EST MOD 30 MIN: CPT | Performed by: INTERNAL MEDICINE

## 2024-01-02 PROCEDURE — 3078F DIAST BP <80 MM HG: CPT | Performed by: INTERNAL MEDICINE

## 2024-01-02 PROCEDURE — 3077F SYST BP >= 140 MM HG: CPT | Performed by: INTERNAL MEDICINE

## 2024-01-02 PROCEDURE — 93000 ELECTROCARDIOGRAM COMPLETE: CPT | Performed by: INTERNAL MEDICINE

## 2024-01-02 RX ORDER — AMIODARONE HYDROCHLORIDE 200 MG/1
200 TABLET ORAL DAILY
Qty: 30 TABLET | Refills: 2 | Status: SHIPPED | OUTPATIENT
Start: 2024-01-02 | End: 2024-03-11

## 2024-01-02 RX ORDER — ATORVASTATIN CALCIUM 80 MG/1
80 TABLET, FILM COATED ORAL DAILY
Qty: 90 TABLET | Refills: 0 | Status: SHIPPED | OUTPATIENT
Start: 2024-01-02 | End: 2024-04-30

## 2024-01-02 ASSESSMENT — ENCOUNTER SYMPTOMS: PALPITATIONS: 1

## 2024-01-02 NOTE — PROGRESS NOTES
"Subjective   Marylou Mcclendon is a 73 y.o. female       Chief Complaint    Follow-up          HPI     Patient returns in follow-up of problems as noted.  She is done well.  I cannot elicit any angina or anginal-like symptomatology such as that that preceded her diagnosis of coronary disease and subsequent PTCA.  Management of atherosclerotic risk factors hyperlipidemia and hypertension is reviewed and control is acceptable.  Because of this we recommend no adjustments or changes.    Her arrhythmia also appears to be well-controlled.  No recurrence on amiodarone.  Because of all the above we suggest continue therapy as before without change.  We did advocate a modest diet.    Review of Systems   Cardiovascular:  Positive for chest pain and palpitations.          Visit Vitals  /62 (BP Location: Left arm)   Pulse 57   Ht 1.549 m (5' 1\")   Wt 63.5 kg (140 lb)   BMI 26.45 kg/m²   Smoking Status Former   BSA 1.65 m²      EKG done in office today   Objective   Physical Exam  Constitutional:       Appearance: Normal appearance. She is normal weight.   HENT:      Nose: Nose normal.   Neck:      Vascular: No carotid bruit.   Cardiovascular:      Rate and Rhythm: Normal rate.      Pulses: Normal pulses.      Heart sounds: Normal heart sounds.   Pulmonary:      Effort: Pulmonary effort is normal.   Abdominal:      General: Bowel sounds are normal.      Palpations: Abdomen is soft.   Genitourinary:     Rectum: Normal.   Musculoskeletal:         General: Normal range of motion.      Cervical back: Normal range of motion.      Right lower leg: No edema.      Left lower leg: No edema.   Skin:     General: Skin is warm and dry.   Neurological:      General: No focal deficit present.      Mental Status: She is alert.   Psychiatric:         Mood and Affect: Mood normal.         Behavior: Behavior normal.         Thought Content: Thought content normal.         Judgment: Judgment normal.         Current Medications    Current " Outpatient Medications:     albuterol 90 mcg/actuation inhaler, Inhale., Disp: , Rfl:     amiodarone (Pacerone) 200 mg tablet, Take 1 tablet (200 mg) by mouth once daily., Disp: , Rfl:     aspirin 325 mg EC tablet, 1 tablet (325 mg) once daily., Disp: , Rfl:     atorvastatin (Lipitor) 80 mg tablet, TAKE 1 TABLET DAILY, Disp: 90 tablet, Rfl: 0    fluticasone-umeclidin-vilanter (TRELEGY-ELLIPTA) 100-62.5-25 mcg blister with device, Inhale 1 puff once daily., Disp: , Rfl:      mg tablet, Take 1 tablet (600 mg) by mouth every 6 hours if needed., Disp: , Rfl:     metFORMIN (Glucophage) 1,000 mg tablet, Take 1 tablet (1,000 mg) by mouth 2 times a day with meals., Disp: , Rfl:     nitroglycerin (Nitrostat) 0.4 mg SL tablet, Place under the tongue. Place 1 tablet under the tongue every 5 minutes up to 3 doses as needed for chest pain. Call 911 if pain persists, Disp: , Rfl:                      Assessment/Plan   1. Coronary artery disease, occlusive  No recurrence of symptoms that preceded diagnosis.  Hence we believe CAD to be stable    2. Paroxysmal atrial fibrillation (CMS/HCC)  No recurrence on amiodarone.  Review of high risk medication testing demonstrates satisfactory findings and no evidence of toxicity.    3. Mixed hyperlipidemia  Well-controlled on current therapy and no need for adjustments.    4. History of PTCA  A durable lasting result without recurrent symptoms.    5. Essential hypertension  Well-controlled on current therapy.  No need for adjustment.    6. Current smoker  The paramount importance of smoking cessation was emphasized.

## 2024-01-02 NOTE — LETTER
"January 5, 2024     Bryce Woods MD  [address] [facility] [address] [facility] [address]  [facility] [address]    Patient: Marylou Mcclendon   YOB: 1950   Date of Visit: 1/2/2024       Dear Dr. Bryce Woods MD:    Thank you for referring Marylou Mcclendon to me for evaluation. Below are my notes for this consultation.  If you have questions, please do not hesitate to call me. I look forward to following your patient along with you.       Sincerely,     Isma Neal MD      CC: No Recipients  ______________________________________________________________________________________    Subjective   Marylou Mcclendon is a 73 y.o. female       Chief Complaint    Follow-up          HPI     Patient returns in follow-up of problems as noted.  She is done well.  I cannot elicit any angina or anginal-like symptomatology such as that that preceded her diagnosis of coronary disease and subsequent PTCA.  Management of atherosclerotic risk factors hyperlipidemia and hypertension is reviewed and control is acceptable.  Because of this we recommend no adjustments or changes.    Her arrhythmia also appears to be well-controlled.  No recurrence on amiodarone.  Because of all the above we suggest continue therapy as before without change.  We did advocate a modest diet.    Review of Systems   Cardiovascular:  Positive for chest pain and palpitations.          Visit Vitals  /62 (BP Location: Left arm)   Pulse 57   Ht 1.549 m (5' 1\")   Wt 63.5 kg (140 lb)   BMI 26.45 kg/m²   Smoking Status Former   BSA 1.65 m²      EKG done in office today   Objective   Physical Exam  Constitutional:       Appearance: Normal appearance. She is normal weight.   HENT:      Nose: Nose normal.   Neck:      Vascular: No carotid bruit.   Cardiovascular:      Rate and Rhythm: Normal rate.      Pulses: Normal pulses.      Heart sounds: Normal heart sounds.   Pulmonary:      Effort: Pulmonary effort is normal.   Abdominal:      General: " Bowel sounds are normal.      Palpations: Abdomen is soft.   Genitourinary:     Rectum: Normal.   Musculoskeletal:         General: Normal range of motion.      Cervical back: Normal range of motion.      Right lower leg: No edema.      Left lower leg: No edema.   Skin:     General: Skin is warm and dry.   Neurological:      General: No focal deficit present.      Mental Status: She is alert.   Psychiatric:         Mood and Affect: Mood normal.         Behavior: Behavior normal.         Thought Content: Thought content normal.         Judgment: Judgment normal.         Current Medications    Current Outpatient Medications:   •  albuterol 90 mcg/actuation inhaler, Inhale., Disp: , Rfl:   •  amiodarone (Pacerone) 200 mg tablet, Take 1 tablet (200 mg) by mouth once daily., Disp: , Rfl:   •  aspirin 325 mg EC tablet, 1 tablet (325 mg) once daily., Disp: , Rfl:   •  atorvastatin (Lipitor) 80 mg tablet, TAKE 1 TABLET DAILY, Disp: 90 tablet, Rfl: 0  •  fluticasone-umeclidin-vilanter (TRELEGY-ELLIPTA) 100-62.5-25 mcg blister with device, Inhale 1 puff once daily., Disp: , Rfl:   •   mg tablet, Take 1 tablet (600 mg) by mouth every 6 hours if needed., Disp: , Rfl:   •  metFORMIN (Glucophage) 1,000 mg tablet, Take 1 tablet (1,000 mg) by mouth 2 times a day with meals., Disp: , Rfl:   •  nitroglycerin (Nitrostat) 0.4 mg SL tablet, Place under the tongue. Place 1 tablet under the tongue every 5 minutes up to 3 doses as needed for chest pain. Call 911 if pain persists, Disp: , Rfl:                      Assessment/Plan   1. Coronary artery disease, occlusive  No recurrence of symptoms that preceded diagnosis.  Hence we believe CAD to be stable    2. Paroxysmal atrial fibrillation (CMS/HCC)  No recurrence on amiodarone.  Review of high risk medication testing demonstrates satisfactory findings and no evidence of toxicity.    3. Mixed hyperlipidemia  Well-controlled on current therapy and no need for adjustments.    4.  History of PTCA  A durable lasting result without recurrent symptoms.    5. Essential hypertension  Well-controlled on current therapy.  No need for adjustment.    6. Current smoker  The paramount importance of smoking cessation was emphasized.

## 2024-01-22 ENCOUNTER — OFFICE VISIT (OUTPATIENT)
Dept: FAMILY MEDICINE CLINIC | Age: 74
End: 2024-01-22
Payer: MEDICARE

## 2024-01-22 VITALS
BODY MASS INDEX: 26.43 KG/M2 | TEMPERATURE: 98.6 F | OXYGEN SATURATION: 95 % | HEART RATE: 60 BPM | WEIGHT: 140 LBS | DIASTOLIC BLOOD PRESSURE: 66 MMHG | SYSTOLIC BLOOD PRESSURE: 140 MMHG | HEIGHT: 61 IN

## 2024-01-22 DIAGNOSIS — I42.9 CARDIOMYOPATHY, UNSPECIFIED TYPE (HCC): ICD-10-CM

## 2024-01-22 DIAGNOSIS — D50.8 OTHER IRON DEFICIENCY ANEMIA: ICD-10-CM

## 2024-01-22 DIAGNOSIS — I48.91 ATRIAL FIBRILLATION, UNSPECIFIED TYPE (HCC): ICD-10-CM

## 2024-01-22 DIAGNOSIS — I20.9 ANGINA PECTORIS (HCC): ICD-10-CM

## 2024-01-22 DIAGNOSIS — E11.59 TYPE 2 DIABETES MELLITUS WITH OTHER CIRCULATORY COMPLICATION, WITHOUT LONG-TERM CURRENT USE OF INSULIN (HCC): ICD-10-CM

## 2024-01-22 DIAGNOSIS — I73.9 PERIPHERAL VASCULAR DISEASE (HCC): ICD-10-CM

## 2024-01-22 DIAGNOSIS — Z00.00 MEDICARE ANNUAL WELLNESS VISIT, SUBSEQUENT: Primary | ICD-10-CM

## 2024-01-22 DIAGNOSIS — I50.20 HEART FAILURE WITH REDUCED EJECTION FRACTION (HCC): ICD-10-CM

## 2024-01-22 DIAGNOSIS — J43.9 PULMONARY EMPHYSEMA, UNSPECIFIED EMPHYSEMA TYPE (HCC): ICD-10-CM

## 2024-01-22 PROBLEM — F17.200 CURRENT SMOKER: Status: RESOLVED | Noted: 2022-05-09 | Resolved: 2024-01-22

## 2024-01-22 PROBLEM — E11.9 DIABETES MELLITUS (HCC): Status: ACTIVE | Noted: 2023-11-16

## 2024-01-22 PROBLEM — D64.9 ANEMIA: Status: RESOLVED | Noted: 2021-12-09 | Resolved: 2024-01-22

## 2024-01-22 LAB
ALBUMIN SERPL-MCNC: 4.4 G/DL (ref 3.5–4.6)
ALP SERPL-CCNC: 116 U/L (ref 40–130)
ALT SERPL-CCNC: 8 U/L (ref 0–33)
ANION GAP SERPL CALCULATED.3IONS-SCNC: 13 MEQ/L (ref 9–15)
AST SERPL-CCNC: 16 U/L (ref 0–35)
BILIRUB SERPL-MCNC: 0.4 MG/DL (ref 0.2–0.7)
BUN SERPL-MCNC: 17 MG/DL (ref 8–23)
CALCIUM SERPL-MCNC: 9.7 MG/DL (ref 8.5–9.9)
CHLORIDE SERPL-SCNC: 103 MEQ/L (ref 95–107)
CHOLEST SERPL-MCNC: 144 MG/DL (ref 0–199)
CO2 SERPL-SCNC: 26 MEQ/L (ref 20–31)
CREAT SERPL-MCNC: 0.93 MG/DL (ref 0.5–0.9)
CREAT UR-MCNC: 40.8 MG/DL
GLOBULIN SER CALC-MCNC: 2.8 G/DL (ref 2.3–3.5)
GLUCOSE SERPL-MCNC: 89 MG/DL (ref 70–99)
HBA1C MFR BLD: 5.6 %
HDLC SERPL-MCNC: 52 MG/DL (ref 40–59)
LDLC SERPL CALC-MCNC: 76 MG/DL (ref 0–129)
MICROALBUMIN UR-MCNC: <1.2 MG/DL
MICROALBUMIN/CREAT UR-RTO: NORMAL MG/G (ref 0–30)
POTASSIUM SERPL-SCNC: 4.4 MEQ/L (ref 3.4–4.9)
PROT SERPL-MCNC: 7.2 G/DL (ref 6.3–8)
SODIUM SERPL-SCNC: 142 MEQ/L (ref 135–144)
TRIGL SERPL-MCNC: 79 MG/DL (ref 0–150)

## 2024-01-22 PROCEDURE — 1123F ACP DISCUSS/DSCN MKR DOCD: CPT | Performed by: FAMILY MEDICINE

## 2024-01-22 PROCEDURE — 83036 HEMOGLOBIN GLYCOSYLATED A1C: CPT | Performed by: FAMILY MEDICINE

## 2024-01-22 PROCEDURE — 3017F COLORECTAL CA SCREEN DOC REV: CPT | Performed by: FAMILY MEDICINE

## 2024-01-22 PROCEDURE — G0439 PPPS, SUBSEQ VISIT: HCPCS | Performed by: FAMILY MEDICINE

## 2024-01-22 PROCEDURE — 3044F HG A1C LEVEL LT 7.0%: CPT | Performed by: FAMILY MEDICINE

## 2024-01-22 PROCEDURE — 3078F DIAST BP <80 MM HG: CPT | Performed by: FAMILY MEDICINE

## 2024-01-22 PROCEDURE — 3077F SYST BP >= 140 MM HG: CPT | Performed by: FAMILY MEDICINE

## 2024-01-22 PROCEDURE — G8484 FLU IMMUNIZE NO ADMIN: HCPCS | Performed by: FAMILY MEDICINE

## 2024-01-22 PROCEDURE — 99214 OFFICE O/P EST MOD 30 MIN: CPT | Performed by: FAMILY MEDICINE

## 2024-01-22 ASSESSMENT — PATIENT HEALTH QUESTIONNAIRE - PHQ9
SUM OF ALL RESPONSES TO PHQ QUESTIONS 1-9: 0
1. LITTLE INTEREST OR PLEASURE IN DOING THINGS: 0
SUM OF ALL RESPONSES TO PHQ QUESTIONS 1-9: 0
2. FEELING DOWN, DEPRESSED OR HOPELESS: 0
SUM OF ALL RESPONSES TO PHQ9 QUESTIONS 1 & 2: 0
SUM OF ALL RESPONSES TO PHQ QUESTIONS 1-9: 0
SUM OF ALL RESPONSES TO PHQ QUESTIONS 1-9: 0

## 2024-01-22 NOTE — PATIENT INSTRUCTIONS
Patient will continue with hematology, cardiology, and pulmonary plans.    Diabetic testing done today and medications will be adjusted if indicated.         Learning About Being Active as an Older Adult  Why is being active important as you get older?     Being active is one of the best things you can do for your health. And it's never too late to start. Being active--or getting active, if you aren't already--has definite benefits. It can:  Give you more energy,  Keep your mind sharp.  Improve balance to reduce your risk of falls.  Help you manage chronic illness with fewer medicines.  No matter how old you are, how fit you are, or what health problems you have, there is a form of activity that will work for you. And the more physical activity you can do, the better your overall health will be.  What kinds of activity can help you stay healthy?  Being more active will make your daily activities easier. Physical activity includes planned exercise and things you do in daily life. There are four types of activity:  Aerobic.  Doing aerobic activity makes your heart and lungs strong.  Includes walking, dancing, and gardening.  Aim for at least 2½ hours spread throughout the week.  It improves your energy and can help you sleep better.  Muscle-strengthening.  This type of activity can help maintain muscle and strengthen bones.  Includes climbing stairs, using resistance bands, and lifting or carrying heavy loads.  Aim for at least twice a week.  It can help protect the knees and other joints.  Stretching.  Stretching gives you better range of motion in joints and muscles.  Includes upper arm stretches, calf stretches, and gentle yoga.  Aim for at least twice a week, preferably after your muscles are warmed up from other activities.  It can help you function better in daily life.  Balancing.  This helps you stay coordinated and have good posture.  Includes heel-to-toe walking, malka chi, and certain types of yoga.  Aim for

## 2024-01-22 NOTE — RESULT ENCOUNTER NOTE
Notify patient current lab values are stable.   No changes to be made in medical management at this time.    Hemoglobin A1c was 5.6.  No need to change any medicines

## 2024-01-22 NOTE — PROGRESS NOTES
Medicare Annual Wellness Visit    Mer Mujica is here for Medicare AWV and Health Maintenance (Declines flu & pneu )    Assessment & Plan   Medicare annual wellness visit, subsequent  Type 2 diabetes mellitus with other circulatory complication, without long-term current use of insulin (HCC)  -     POCT glycosylated hemoglobin (Hb A1C)  -     Microalbumin / Creatinine Urine Ratio  -     Lipid Panel; Future  -     Comprehensive Metabolic Panel; Future  -      DIABETES FOOT EXAM  Heart failure with reduced ejection fraction (HCC)  Pulmonary emphysema, unspecified emphysema type (HCC)  -     Handicap Placard MISC; Starting Mon 1/22/2024, Disp-1 each, R-0, PrintPt cannot ambulate > 100 ft without rest  Good for 5 years  Cardiomyopathy, unspecified type (HCC)  -     Handicap Placard MISC; Starting Mon 1/22/2024, Disp-1 each, R-0, PrintPt cannot ambulate > 100 ft without rest  Good for 5 years  Peripheral vascular disease (HCC)  Atrial fibrillation, unspecified type (HCC)  Angina pectoris (HCC)  Other iron deficiency anemia  Recommendations for Preventive Services Due: see orders and patient instructions/AVS.  Recommended screening schedule for the next 5-10 years is provided to the patient in written form: see Patient Instructions/AVS.     Return in about 6 months (around 7/22/2024) for for routine major medical condition management.     Subjective   The following acute and/or chronic problems were also addressed today:  Patient is coming due for her iron infusion in the next week and her last laboratories in November demonstrated improvement.    Up-to-date with cardiovascular appointments.  No medication changes.  They did briefly discuss blood thinners but have not started that treatment plan yet.    Due for a breathing test for her pulmonary physician.  That is scheduled.    Eye appointment scheduled next month.    Patient's complete Health Risk Assessment and screening values have been reviewed and are found in

## 2024-02-06 ENCOUNTER — TELEPHONE (OUTPATIENT)
Dept: CARDIOLOGY | Facility: CLINIC | Age: 74
End: 2024-02-06
Payer: MEDICARE

## 2024-02-06 NOTE — TELEPHONE ENCOUNTER
Patient due for Amiodarone testing at [facility]. They have not been able to reach patient. I am unable to either. Letter mailed to contact office.

## 2024-02-09 NOTE — TELEPHONE ENCOUNTER
Dr Herring out     Future Appointments    Encounter Information   Provider Department Appt Notes   [facility]   7/22/2024 Noé Herring MD [facility] Return in about 6 months (around 7/22/2024) for for routine major medical condition management.     Past Visits    Date Provider Specialty Visit Type Primary Dx   01/22/2024 Noé Herring MD Family Medicine Office Visit Medicare annual wellness visit, subsequent

## 2024-02-12 NOTE — TELEPHONE ENCOUNTER
Patient phoned states she is scheduled tomorrow for her pft and will do the chest xray and labs with it.

## 2024-02-13 LAB
NON-UH HIE ANION GAP: 9 (ref 6–15)
NON-UH HIE ASPARTATE AMINO TRANSFERASE: 12 U/L (ref 13–39)
NON-UH HIE BLOOD UREA NITROGEN: 17 MG/DL (ref 7–25)
NON-UH HIE CALCIUM: 9.4 MG/DL (ref 8.6–10.3)
NON-UH HIE CARBON DIOXIDE: 31.6 MMOL/L (ref 21–31)
NON-UH HIE CHLORIDE: 105 MMOL/L (ref 98–107)
NON-UH HIE CREATININE: 0.98 MG/DL (ref 0.6–1.2)
NON-UH HIE ESTIMATED GFR: > 60
NON-UH HIE GLUCOSE: 100 MG/DL (ref 70–100)
NON-UH HIE POTASSIUM: 4.6 MMOL/L (ref 3.5–5.1)
NON-UH HIE SODIUM: 141 MMOL/L (ref 136–145)
NON-UH HIE THYROID STIMULATING HORMONE: 2.14 U[IU]/ML (ref 0.45–5.33)

## 2024-03-04 ENCOUNTER — TELEPHONE (OUTPATIENT)
Dept: FAMILY MEDICINE CLINIC | Age: 74
End: 2024-03-04

## 2024-03-04 DIAGNOSIS — Z12.31 BREAST CANCER SCREENING BY MAMMOGRAM: Primary | ICD-10-CM

## 2024-03-04 NOTE — TELEPHONE ENCOUNTER
Pt stopped by the window to ask for a referral for Urology and a referral/order for a Mammogram     Please can we call patient back once these are done     Pt phone [phone]

## 2024-03-04 NOTE — TELEPHONE ENCOUNTER
Patient already has urologist and orthopedist on file.  She should not need a referral for any of this as she is a Medicare patient.  She just needs to call her physicians and make appointments.  In addition I have not seen her for either of these conditions in the last year if I am reading the chart correctly

## 2024-03-04 NOTE — TELEPHONE ENCOUNTER
Please call pt back and find out what referral for Urology would be in regards to please and then send message back to the pool .

## 2024-03-04 NOTE — TELEPHONE ENCOUNTER
Referral for Urology is for Botox for her bladder     Also needs a referral for Orthopedics for her Hands ( Fingers lock up) Trigger Finger DX

## 2024-03-09 DIAGNOSIS — I48.0 PAROXYSMAL ATRIAL FIBRILLATION (MULTI): ICD-10-CM

## 2024-03-11 RX ORDER — AMIODARONE HYDROCHLORIDE 200 MG/1
200 TABLET ORAL DAILY
Qty: 90 TABLET | Refills: 3 | Status: SHIPPED | OUTPATIENT
Start: 2024-03-11

## 2024-04-23 DIAGNOSIS — E78.5 HYPERLIPIDEMIA, UNSPECIFIED HYPERLIPIDEMIA TYPE: ICD-10-CM

## 2024-04-30 RX ORDER — ATORVASTATIN CALCIUM 80 MG/1
80 TABLET, FILM COATED ORAL DAILY
Qty: 90 TABLET | Refills: 3 | Status: SHIPPED | OUTPATIENT
Start: 2024-04-30

## 2024-07-02 ENCOUNTER — TELEPHONE (OUTPATIENT)
Dept: FAMILY MEDICINE CLINIC | Age: 74
End: 2024-07-02

## 2024-07-18 ENCOUNTER — OFFICE VISIT (OUTPATIENT)
Dept: FAMILY MEDICINE CLINIC | Age: 74
End: 2024-07-18

## 2024-07-18 VITALS
SYSTOLIC BLOOD PRESSURE: 130 MMHG | HEART RATE: 60 BPM | BODY MASS INDEX: 27.3 KG/M2 | WEIGHT: 144.6 LBS | DIASTOLIC BLOOD PRESSURE: 62 MMHG | HEIGHT: 61 IN | OXYGEN SATURATION: 95 %

## 2024-07-18 DIAGNOSIS — E11.59 TYPE 2 DIABETES MELLITUS WITH OTHER CIRCULATORY COMPLICATION, WITHOUT LONG-TERM CURRENT USE OF INSULIN (HCC): ICD-10-CM

## 2024-07-18 DIAGNOSIS — I42.9 CARDIOMYOPATHY, UNSPECIFIED TYPE (HCC): ICD-10-CM

## 2024-07-18 DIAGNOSIS — K59.00 CONSTIPATION, UNSPECIFIED CONSTIPATION TYPE: ICD-10-CM

## 2024-07-18 DIAGNOSIS — E61.1 IRON DEFICIENCY: ICD-10-CM

## 2024-07-18 DIAGNOSIS — E11.59 TYPE 2 DIABETES MELLITUS WITH OTHER CIRCULATORY COMPLICATION, WITHOUT LONG-TERM CURRENT USE OF INSULIN (HCC): Primary | ICD-10-CM

## 2024-07-18 LAB
ANION GAP SERPL CALCULATED.3IONS-SCNC: 10 MEQ/L (ref 9–15)
BUN SERPL-MCNC: 16 MG/DL (ref 8–23)
CALCIUM SERPL-MCNC: 9.2 MG/DL (ref 8.5–9.9)
CHLORIDE SERPL-SCNC: 106 MEQ/L (ref 95–107)
CO2 SERPL-SCNC: 25 MEQ/L (ref 20–31)
CREAT SERPL-MCNC: 0.98 MG/DL (ref 0.5–0.9)
GLUCOSE SERPL-MCNC: 85 MG/DL (ref 70–99)
POTASSIUM SERPL-SCNC: 4.8 MEQ/L (ref 3.4–4.9)
SODIUM SERPL-SCNC: 141 MEQ/L (ref 135–144)

## 2024-07-18 RX ORDER — LISINOPRIL 5 MG/1
5 TABLET ORAL DAILY
Qty: 30 TABLET | Refills: 2 | Status: SHIPPED | OUTPATIENT
Start: 2024-07-18

## 2024-07-18 SDOH — ECONOMIC STABILITY: FOOD INSECURITY: WITHIN THE PAST 12 MONTHS, THE FOOD YOU BOUGHT JUST DIDN'T LAST AND YOU DIDN'T HAVE MONEY TO GET MORE.: NEVER TRUE

## 2024-07-18 SDOH — ECONOMIC STABILITY: FOOD INSECURITY: WITHIN THE PAST 12 MONTHS, YOU WORRIED THAT YOUR FOOD WOULD RUN OUT BEFORE YOU GOT MONEY TO BUY MORE.: NEVER TRUE

## 2024-07-18 SDOH — ECONOMIC STABILITY: INCOME INSECURITY: HOW HARD IS IT FOR YOU TO PAY FOR THE VERY BASICS LIKE FOOD, HOUSING, MEDICAL CARE, AND HEATING?: NOT HARD AT ALL

## 2024-07-18 ASSESSMENT — ENCOUNTER SYMPTOMS
ABDOMINAL PAIN: 0
PHOTOPHOBIA: 0
ABDOMINAL DISTENTION: 0
NAUSEA: 0
SHORTNESS OF BREATH: 0
CHEST TIGHTNESS: 0
CONSTIPATION: 1
VOMITING: 0

## 2024-07-18 NOTE — PROGRESS NOTES
Diagnosis Orders   1. Type 2 diabetes mellitus with other circulatory complication, without long-term current use of insulin (HCC)  Basic Metabolic Panel    Hemoglobin A1C      2. Cardiomyopathy, unspecified type ([facility])  lisinopril (PRINIVIL;ZESTRIL) 5 MG tablet      3. Iron deficiency        4. Constipation, unspecified constipation type          Return for 2-3 week f/u appt.  Patient Instructions   Patient will continue metformin 1000 mg twice a day.    Discontinue hydrochlorothiazide for potential dehydration related to her diuretic worsening constipation.  Will add lisinopril 5 mg for blood pressure control.  Not utilizing ibuprofen daily.  Will continue to monitor kidney function    MiraLAX will be increased to daily usage.  Continue good water intake.        Subjective:      Patient ID: Mer Mujica is a 74 y.o. female who presents for:  Chief Complaint   Patient presents with    Diabetes     Pt states bs have fluctuated.     Hypertension     States bp has been elevated lately, states  passed away and has more stress.     Constipation     States she has difficulty going to the bathroom, states she is having small bm and not having full movements. States she can go 2-3 days without bm. Tried stool softeners, laxatives, and suppositories. Had this issue as long as pt can remember.        Sbp running 170 part of the time and blood sugar running >150.  Taking metformin routinely.  Drinks 8-10 glasses of water daily.  No swelling.  Infrequent and hard bm.  Using miralax every other day        Current Outpatient Medications on File Prior to Visit   Medication Sig Dispense Refill    Iron-Vit C-Vit B12-Folic Acid (IRON 100 PLUS PO) Take by mouth      metFORMIN (GLUCOPHAGE) 1000 MG tablet TAKE 1 TABLET BY MOUTH TWICE  DAILY WITH MEALS 180 tablet 3    Handicap Placard MISC by Does not apply route Pt cannot ambulate > 100 ft without rest    Good for 5 years 1 each 0    ibuprofen (ADVIL;MOTRIN) 600 MG tablet Take 1

## 2024-07-18 NOTE — PATIENT INSTRUCTIONS
Patient will continue metformin 1000 mg twice a day.    Discontinue hydrochlorothiazide for potential dehydration related to her diuretic worsening constipation.  Will add lisinopril 5 mg for blood pressure control.  Not utilizing ibuprofen daily.  Will continue to monitor kidney function    MiraLAX will be increased to daily usage.  Continue good water intake.

## 2024-07-19 LAB
ESTIMATED AVERAGE GLUCOSE: 126 MG/DL
HBA1C MFR BLD: 6 % (ref 4–6)

## 2024-07-19 NOTE — RESULT ENCOUNTER NOTE
Notify patient current lab values are  stable.   No changes to be made in medical management at this time.

## 2024-08-12 ENCOUNTER — OFFICE VISIT (OUTPATIENT)
Dept: FAMILY MEDICINE CLINIC | Age: 74
End: 2024-08-12
Payer: MEDICARE

## 2024-08-12 VITALS
BODY MASS INDEX: 27.02 KG/M2 | WEIGHT: 145 LBS | SYSTOLIC BLOOD PRESSURE: 128 MMHG | DIASTOLIC BLOOD PRESSURE: 62 MMHG | HEART RATE: 74 BPM | OXYGEN SATURATION: 97 %

## 2024-08-12 DIAGNOSIS — N28.9 RENAL INSUFFICIENCY: ICD-10-CM

## 2024-08-12 DIAGNOSIS — K59.00 CONSTIPATION, UNSPECIFIED CONSTIPATION TYPE: Primary | ICD-10-CM

## 2024-08-12 PROBLEM — Z87.891 PERSONAL HISTORY OF NICOTINE DEPENDENCE: Status: RESOLVED | Noted: 2021-12-09 | Resolved: 2024-08-12

## 2024-08-12 PROBLEM — R39.9 SYMPTOMS INVOLVING URINARY SYSTEM: Status: RESOLVED | Noted: 2021-12-09 | Resolved: 2024-08-12

## 2024-08-12 PROBLEM — S69.92XA INJURY OF LEFT WRIST: Status: RESOLVED | Noted: 2023-09-05 | Resolved: 2024-08-12

## 2024-08-12 LAB
ANION GAP SERPL CALCULATED.3IONS-SCNC: 10 MEQ/L (ref 9–15)
BUN SERPL-MCNC: 14 MG/DL (ref 8–23)
CALCIUM SERPL-MCNC: 9.2 MG/DL (ref 8.5–9.9)
CHLORIDE SERPL-SCNC: 105 MEQ/L (ref 95–107)
CO2 SERPL-SCNC: 27 MEQ/L (ref 20–31)
CREAT SERPL-MCNC: 1.11 MG/DL (ref 0.5–0.9)
GLUCOSE SERPL-MCNC: 54 MG/DL (ref 70–99)
POTASSIUM SERPL-SCNC: 4.3 MEQ/L (ref 3.4–4.9)
SODIUM SERPL-SCNC: 142 MEQ/L (ref 135–144)

## 2024-08-12 PROCEDURE — 1123F ACP DISCUSS/DSCN MKR DOCD: CPT | Performed by: FAMILY MEDICINE

## 2024-08-12 PROCEDURE — 1090F PRES/ABSN URINE INCON ASSESS: CPT | Performed by: FAMILY MEDICINE

## 2024-08-12 PROCEDURE — G8417 CALC BMI ABV UP PARAM F/U: HCPCS | Performed by: FAMILY MEDICINE

## 2024-08-12 PROCEDURE — G8427 DOCREV CUR MEDS BY ELIG CLIN: HCPCS | Performed by: FAMILY MEDICINE

## 2024-08-12 PROCEDURE — 3074F SYST BP LT 130 MM HG: CPT | Performed by: FAMILY MEDICINE

## 2024-08-12 PROCEDURE — 3078F DIAST BP <80 MM HG: CPT | Performed by: FAMILY MEDICINE

## 2024-08-12 PROCEDURE — 4004F PT TOBACCO SCREEN RCVD TLK: CPT | Performed by: FAMILY MEDICINE

## 2024-08-12 PROCEDURE — G8400 PT W/DXA NO RESULTS DOC: HCPCS | Performed by: FAMILY MEDICINE

## 2024-08-12 PROCEDURE — 99214 OFFICE O/P EST MOD 30 MIN: CPT | Performed by: FAMILY MEDICINE

## 2024-08-12 ASSESSMENT — ENCOUNTER SYMPTOMS
ABDOMINAL DISTENTION: 0
VOMITING: 0
CHEST TIGHTNESS: 0
CONSTIPATION: 0
SHORTNESS OF BREATH: 0
PHOTOPHOBIA: 0
ABDOMINAL PAIN: 0
DIARRHEA: 0
NAUSEA: 0

## 2024-08-12 NOTE — PROGRESS NOTES
back: Neck supple.   Skin:     General: Skin is warm and dry.      Findings: No bruising or rash.   Neurological:      Mental Status: She is alert.      Coordination: Coordination normal.   Psychiatric:         Thought Content: Thought content normal.         Judgment: Judgment normal.         No results found for this visit on 08/12/24.    Recent Results (from the past 2016 hour(s))   Hemoglobin A1C    Collection Time: 07/18/24 11:45 AM   Result Value Ref Range    Hemoglobin A1C 6.0 4.0 - 6.0 %    Estimated Avg Glucose 126 mg/dL   Basic Metabolic Panel    Collection Time: 07/18/24 11:47 AM   Result Value Ref Range    Sodium 141 135 - 144 mEq/L    Potassium 4.8 3.4 - 4.9 mEq/L    Chloride 106 95 - 107 mEq/L    CO2 25 20 - 31 mEq/L    Anion Gap 10 9 - 15 mEq/L    Glucose 85 70 - 99 mg/dL    BUN 16 8 - 23 mg/dL    Creatinine 0.98 (H) 0.50 - 0.90 mg/dL    Est, Glom Filt Rate 59.7 (L) >60    Calcium 9.2 8.5 - 9.9 mg/dL       [] Pt was seen by provider for      Minutes  Counseling and coordination of care was done for all assessment diagnosis listed for today with patient and any family/friend present.   More than 50% of this visit was spent coordinating current care, obtaining information for prior records, and counseling for current plan of action.           Assessment:       Diagnosis Orders   1. Constipation, unspecified constipation type        2. Renal insufficiency  Basic Metabolic Panel            Orders Placed This Encounter   Procedures    Basic Metabolic Panel     Standing Status:   Future     Standing Expiration Date:   8/12/2025       No orders of the defined types were placed in this encounter.         Medication List            Accurate as of August 12, 2024 11:39 AM. If you have any questions, ask your nurse or doctor.                CONTINUE taking these medications      albuterol (2.5 MG/3ML) 0.083% nebulizer solution  Commonly known as: PROVENTIL     aspirin 81 MG tablet     atorvastatin 80 MG

## 2024-08-12 NOTE — PATIENT INSTRUCTIONS
Patient will continue lisinopril 5 mg daily.    Will recheck BMP to evaluate renal insufficiency status post stopping diuretic.    Patient will remain off of hydrochlorothiazide.    Continue MiraLAX for control of constipation

## 2024-08-13 DIAGNOSIS — N28.9 RENAL INSUFFICIENCY: Primary | ICD-10-CM

## 2024-08-13 NOTE — RESULT ENCOUNTER NOTE
Renal insufficiency labs within her range.  Slightly abnormal.  Create order for BMP in 3 months for her to have repeated to monitor for changes

## 2024-10-08 ENCOUNTER — APPOINTMENT (OUTPATIENT)
Dept: CARDIOLOGY | Facility: CLINIC | Age: 74
End: 2024-10-08
Payer: MEDICARE

## 2024-10-10 ENCOUNTER — TELEPHONE (OUTPATIENT)
Dept: CARDIOLOGY | Facility: CLINIC | Age: 74
End: 2024-10-10
Payer: MEDICARE

## 2024-10-10 NOTE — TELEPHONE ENCOUNTER
Patient phones requesting cardiac clearance for Jaw implants with Dr Severo Astudillo. Former Dr. Isma Neal MD patient. Spoke with Polly who states patient can be scheduled for a pending OV with Dr. Delma Edward MD 11/6 at 10 am where clearance can then be discussed.     Informed patient of recommended appointment, she verbalized understanding.

## 2024-11-05 DIAGNOSIS — I42.9 CARDIOMYOPATHY, UNSPECIFIED TYPE (HCC): ICD-10-CM

## 2024-11-05 RX ORDER — LISINOPRIL 5 MG/1
5 TABLET ORAL DAILY
Qty: 30 TABLET | Refills: 0 | Status: SHIPPED | OUTPATIENT
Start: 2024-11-05

## 2024-11-05 NOTE — TELEPHONE ENCOUNTER
Comments:     Last Office Visit (last PCP visit):   8/12/2024    Next Visit Date:  Future Appointments   Date Time Provider Department Center   11/12/2024  9:45 AM Noé Herring MD [facility] DEP       **If hasn't been seen in over a year OR hasn't followed up according to last diabetes/ADHD visit, make appointment for patient before sending refill to provider.    Rx requested:  Requested Prescriptions     Pending Prescriptions Disp Refills    lisinopril (PRINIVIL;ZESTRIL) 5 MG tablet [Pharmacy Med Name: Lisinopril Oral Tablet 5 MG] 30 tablet 0     Sig: TAKE ONE TABLET BY MOUTH DAILY

## 2024-11-08 ENCOUNTER — APPOINTMENT (OUTPATIENT)
Dept: CARDIOLOGY | Facility: CLINIC | Age: 74
End: 2024-11-08
Payer: MEDICARE

## 2024-11-08 VITALS
DIASTOLIC BLOOD PRESSURE: 64 MMHG | WEIGHT: 144.4 LBS | SYSTOLIC BLOOD PRESSURE: 116 MMHG | HEART RATE: 65 BPM | BODY MASS INDEX: 27.26 KG/M2 | HEIGHT: 61 IN

## 2024-11-08 DIAGNOSIS — Z98.61 HISTORY OF PTCA: ICD-10-CM

## 2024-11-08 DIAGNOSIS — I25.10 CORONARY ARTERY DISEASE, OCCLUSIVE: ICD-10-CM

## 2024-11-08 DIAGNOSIS — I48.11 LONGSTANDING PERSISTENT ATRIAL FIBRILLATION (MULTI): ICD-10-CM

## 2024-11-08 DIAGNOSIS — I25.10 MULTIPLE VESSEL CORONARY ARTERY DISEASE: ICD-10-CM

## 2024-11-08 DIAGNOSIS — R23.3 EASY BRUISABILITY: ICD-10-CM

## 2024-11-08 DIAGNOSIS — I10 ESSENTIAL HYPERTENSION: ICD-10-CM

## 2024-11-08 DIAGNOSIS — I65.23 BILATERAL CAROTID ARTERY STENOSIS: ICD-10-CM

## 2024-11-08 DIAGNOSIS — E78.5 HYPERLIPIDEMIA, UNSPECIFIED HYPERLIPIDEMIA TYPE: ICD-10-CM

## 2024-11-08 DIAGNOSIS — Z79.899 HIGH RISK MEDICATION USE: ICD-10-CM

## 2024-11-08 DIAGNOSIS — Z87.891 FORMER SMOKER: ICD-10-CM

## 2024-11-08 DIAGNOSIS — I20.9 ANGINA PECTORIS: Primary | ICD-10-CM

## 2024-11-08 PROBLEM — I20.0 ANGINA PECTORIS, UNSTABLE (MULTI): Status: RESOLVED | Noted: 2024-11-08 | Resolved: 2024-11-08

## 2024-11-08 PROBLEM — I20.0 ANGINA PECTORIS, UNSTABLE (MULTI): Status: ACTIVE | Noted: 2024-11-08

## 2024-11-08 PROCEDURE — 1160F RVW MEDS BY RX/DR IN RCRD: CPT | Performed by: INTERNAL MEDICINE

## 2024-11-08 PROCEDURE — G2211 COMPLEX E/M VISIT ADD ON: HCPCS | Performed by: INTERNAL MEDICINE

## 2024-11-08 PROCEDURE — 99214 OFFICE O/P EST MOD 30 MIN: CPT | Performed by: INTERNAL MEDICINE

## 2024-11-08 PROCEDURE — 3074F SYST BP LT 130 MM HG: CPT | Performed by: INTERNAL MEDICINE

## 2024-11-08 PROCEDURE — 1159F MED LIST DOCD IN RCRD: CPT | Performed by: INTERNAL MEDICINE

## 2024-11-08 PROCEDURE — 1036F TOBACCO NON-USER: CPT | Performed by: INTERNAL MEDICINE

## 2024-11-08 PROCEDURE — 3078F DIAST BP <80 MM HG: CPT | Performed by: INTERNAL MEDICINE

## 2024-11-08 PROCEDURE — 93000 ELECTROCARDIOGRAM COMPLETE: CPT | Performed by: INTERNAL MEDICINE

## 2024-11-08 RX ORDER — BENZONATATE 200 MG/1
200 CAPSULE ORAL 3 TIMES DAILY PRN
COMMUNITY
Start: 2024-02-20

## 2024-11-08 RX ORDER — CHOLECALCIFEROL (VITAMIN D3) 125 MCG
1 CAPSULE ORAL DAILY
COMMUNITY

## 2024-11-08 RX ORDER — ASPIRIN 81 MG/1
81 TABLET ORAL EVERY OTHER DAY
Start: 2024-11-08 | End: 2025-11-08

## 2024-11-08 RX ORDER — ASPIRIN 81 MG/1
81 TABLET ORAL DAILY
COMMUNITY
End: 2024-11-08 | Stop reason: DRUGHIGH

## 2024-11-08 RX ORDER — LISINOPRIL 5 MG/1
1 TABLET ORAL
COMMUNITY
Start: 2024-09-28

## 2024-11-08 NOTE — LETTER
November 8, 2024     Bryce Woods MD  [address]  [place] [address]    Patient: Marylou Mcclendon   YOB: 1950   Date of Visit: 11/8/2024       Dear Dr. Bryce Woods MD:    Thank you for referring Marylou Mcclendon to me for evaluation. Below are my notes for this consultation.  If you have questions, please do not hesitate to call me. I look forward to following your patient along with you.       Sincerely,     Delma Edward MD      CC: No Recipients  ______________________________________________________________________________________    Subjective   Marylou Mcclendon is a 74 y.o. female       Chief Complaint    Follow-up          HPI   Patient is here for follow-up continue management for coronary artery disease, atrial fibrillation, high risk medication.  She is a former patient of Dr. Neal.  She underwent cardiac catheterization 3 years ago by Dr. Zapata that showed distal left main disease and occlusion of the small nondominant RCA.  Medical therapy was recommended.  Since last time she was seen she reports intermittent episodes of angina resolved with nitroglycerin.  She denies lightheadedness, dizziness or syncope.  Her recent amiodarone surveillance testing noted and reviewed with her.  Reviewing the record indicate that she has not had recent amiodarone surveillance testing.  A CT scan of the chest was done recently which is followed by her PCP and pulmonary and apparently had shown some nodules.  The patient also had some carotid disease that has not been checked in years.  Her laboratory data noted and reviewed with her.    Assessment    1.  Multivessel coronary artery disease documented on previous heart cath did not require intervention.  Medical therapy was recommended but she have distal left main disease and RCA disease.  She is having intermittent angina  2.  Persistent atrial fibrillation maintaining sinus rhythm with amiodarone she  "remained in normal rhythm  3.  High risk medication form of amiodarone  4.  Bilateral carotid disease has not been checked in 3 years  5.  Essential hypertension  6.  Hyperlipidemia  7.  Easy bruisability due to aspirin  8.  Former smoker    Plan    1.  Considering her anatomy and symptoms chest pain I recommended Lexiscan myocardial perfusion study  2.  I recommended the patient to repeat her amiodarone surveillance testing  3.  I recommended patient to repeat her carotid Doppler  4.  I advised the patient to cut her aspirin to every other day  5.  I advised the patient to notify if she has change in her cardiac status or symptoms or increased frequency of chest pain.  Review of Systems   Cardiovascular:  Positive for chest pain.   All other systems reviewed and are negative.           Vitals:    11/08/24 1010   BP: 116/64   BP Location: Left arm   Patient Position: Sitting   Pulse: 65   Weight: 65.5 kg (144 lb 6.4 oz)   Height: 1.549 m (5' 1\")        EKG done in office today     Objective   Physical Exam  Constitutional:       Appearance: Normal appearance.   HENT:      Nose: Nose normal.   Neck:      Vascular: No carotid bruit.   Cardiovascular:      Rate and Rhythm: Normal rate. Rhythm irregularly irregular.      Pulses: Normal pulses.      Heart sounds: Normal heart sounds.   Pulmonary:      Effort: Pulmonary effort is normal.   Abdominal:      General: Bowel sounds are normal.      Palpations: Abdomen is soft.   Musculoskeletal:         General: Normal range of motion.      Cervical back: Normal range of motion.      Right lower leg: No edema.      Left lower leg: No edema.   Skin:     General: Skin is warm and dry.   Neurological:      General: No focal deficit present.      Mental Status: She is alert.   Psychiatric:         Mood and Affect: Mood normal.         Behavior: Behavior normal.         Thought Content: Thought content normal.         Judgment: Judgment normal.         Allergies  Rivaroxaban, Ace " inhibitors, and Diltiazem     Current Medications    Current Outpatient Medications:   •  albuterol 90 mcg/actuation inhaler, Inhale., Disp: , Rfl:   •  amiodarone (Pacerone) 200 mg tablet, TAKE 1 TABLET BY MOUTH ONCE  DAILY, Disp: 90 tablet, Rfl: 3  •  atorvastatin (Lipitor) 80 mg tablet, TAKE 1 TABLET BY MOUTH ONCE  DAILY, Disp: 90 tablet, Rfl: 3  •  benzonatate (Tessalon) 200 mg capsule, Take 1 capsule (200 mg) by mouth 3 times a day as needed for cough., Disp: , Rfl:   •  fluticasone-umeclidin-vilanter (TRELEGY-ELLIPTA) 100-62.5-25 mcg blister with device, Inhale 1 puff once daily., Disp: , Rfl:   •   mg tablet, Take 1 tablet (600 mg) by mouth every 6 hours if needed., Disp: , Rfl:   •  lisinopril 5 mg tablet, Take 1 tablet (5 mg) by mouth early in the morning.., Disp: , Rfl:   •  mecobalamin, vitamin B12, 1,000 mcg tablet,disintegrating, Place 1 tablet under the tongue once daily., Disp: , Rfl:   •  metFORMIN (Glucophage) 1,000 mg tablet, Take 1 tablet (1,000 mg) by mouth 2 times daily (morning and late afternoon)., Disp: , Rfl:   •  nitroglycerin (Nitrostat) 0.4 mg SL tablet, Place under the tongue. Place 1 tablet under the tongue every 5 minutes up to 3 doses as needed for chest pain. Call 911 if pain persists, Disp: , Rfl:   •  aspirin 81 mg EC tablet, Take 1 tablet (81 mg) by mouth every other day., Disp: , Rfl:                      Assessment/Plan   1. Angina pectoris  Nuclear Stress Test      2. Coronary artery disease, occlusive  Follow Up In Cardiology    Nuclear Stress Test    aspirin 81 mg EC tablet    Vascular US Carotid Artery Duplex Bilateral      3. Essential hypertension  Follow Up In Cardiology      4. Hyperlipidemia, unspecified hyperlipidemia type  Follow Up In Cardiology      5. Multiple vessel coronary artery disease        6. Bilateral carotid artery stenosis  Vascular US Carotid Artery Duplex Bilateral      7. History of PTCA        8. Former smoker        9. Longstanding persistent  atrial fibrillation (Multi)  ECG 12 Lead    Complete Pulmonary Function Test (Spirometry/DLCO/Lung Volumes)    Thyroid Stimulating Hormone    Thyroid Stimulating Hormone      10. Easy bruisability        11. High risk medication use                 Scribe Attestation  By signing my name below, IRebecca LPN, Scribe   attest that this documentation has been prepared under the direction and in the presence of Delma Edward MD.     Provider Attestation - Scribe documentation    All medical record entries made by the Scribe were at my direction and personally dictated by me. I have reviewed the chart and agree that the record accurately reflects my personal performance of the history, physical exam, discussion and plan.

## 2024-11-08 NOTE — PATIENT INSTRUCTIONS
Please bring all medicines, vitamins, and herbal supplements with you when you come to the office.    Prescriptions will not be filled unless you are compliant with your follow up appointments or have a follow up appointment scheduled as per instruction of your physician. Refills should be requested at the time of your visit.     BMI was above normal measurement. Current weight: 65.5 kg (144 lb 6.4 oz)  Weight change since last visit (-) denotes wt loss 4.4 lbs   Weight loss needed to achieve BMI 25: 12.4 Lbs  Weight loss needed to achieve BMI 30: -14 Lbs  Provided instructions on dietary changes  Provided instructions on exercise.    Lexiscan  Carotid  Amio work up  6 months

## 2024-11-08 NOTE — PROGRESS NOTES
Subjective   Marylou Mcclendon is a 74 y.o. female       Chief Complaint    Follow-up          HPI   Patient is here for follow-up continue management for coronary artery disease, atrial fibrillation, high risk medication.  She is a former patient of Dr. Neal.  She underwent cardiac catheterization 3 years ago by Dr. Zapata that showed distal left main disease and occlusion of the small nondominant RCA.  Medical therapy was recommended.  Since last time she was seen she reports intermittent episodes of angina resolved with nitroglycerin.  She denies lightheadedness, dizziness or syncope.  Her recent amiodarone surveillance testing noted and reviewed with her.  Reviewing the record indicate that she has not had recent amiodarone surveillance testing.  A CT scan of the chest was done recently which is followed by her PCP and pulmonary and apparently had shown some nodules.  The patient also had some carotid disease that has not been checked in years.  Her laboratory data noted and reviewed with her.    Assessment    1.  Multivessel coronary artery disease documented on previous heart cath did not require intervention.  Medical therapy was recommended but she have distal left main disease and RCA disease.  She is having intermittent angina  2.  Persistent atrial fibrillation maintaining sinus rhythm with amiodarone she remained in normal rhythm  3.  High risk medication form of amiodarone  4.  Bilateral carotid disease has not been checked in 3 years  5.  Essential hypertension  6.  Hyperlipidemia  7.  Easy bruisability due to aspirin  8.  Former smoker    Plan    1.  Considering her anatomy and symptoms chest pain I recommended Lexiscan myocardial perfusion study  2.  I recommended the patient to repeat her amiodarone surveillance testing  3.  I recommended patient to repeat her carotid Doppler  4.  I advised the patient to cut her aspirin to every other day  5.  I advised the patient to notify if she has change in her  "cardiac status or symptoms or increased frequency of chest pain.  Review of Systems   Cardiovascular:  Positive for chest pain.   All other systems reviewed and are negative.           Vitals:    11/08/24 1010   BP: 116/64   BP Location: Left arm   Patient Position: Sitting   Pulse: 65   Weight: 65.5 kg (144 lb 6.4 oz)   Height: 1.549 m (5' 1\")        EKG done in office today     Objective   Physical Exam  Constitutional:       Appearance: Normal appearance.   HENT:      Nose: Nose normal.   Neck:      Vascular: No carotid bruit.   Cardiovascular:      Rate and Rhythm: Normal rate. Rhythm irregularly irregular.      Pulses: Normal pulses.      Heart sounds: Normal heart sounds.   Pulmonary:      Effort: Pulmonary effort is normal.   Abdominal:      General: Bowel sounds are normal.      Palpations: Abdomen is soft.   Musculoskeletal:         General: Normal range of motion.      Cervical back: Normal range of motion.      Right lower leg: No edema.      Left lower leg: No edema.   Skin:     General: Skin is warm and dry.   Neurological:      General: No focal deficit present.      Mental Status: She is alert.   Psychiatric:         Mood and Affect: Mood normal.         Behavior: Behavior normal.         Thought Content: Thought content normal.         Judgment: Judgment normal.         Allergies  Rivaroxaban, Ace inhibitors, and Diltiazem     Current Medications    Current Outpatient Medications:     albuterol 90 mcg/actuation inhaler, Inhale., Disp: , Rfl:     amiodarone (Pacerone) 200 mg tablet, TAKE 1 TABLET BY MOUTH ONCE  DAILY, Disp: 90 tablet, Rfl: 3    atorvastatin (Lipitor) 80 mg tablet, TAKE 1 TABLET BY MOUTH ONCE  DAILY, Disp: 90 tablet, Rfl: 3    benzonatate (Tessalon) 200 mg capsule, Take 1 capsule (200 mg) by mouth 3 times a day as needed for cough., Disp: , Rfl:     fluticasone-umeclidin-vilanter (TRELEGY-ELLIPTA) 100-62.5-25 mcg blister with device, Inhale 1 puff once daily., Disp: , Rfl:      " mg tablet, Take 1 tablet (600 mg) by mouth every 6 hours if needed., Disp: , Rfl:     lisinopril 5 mg tablet, Take 1 tablet (5 mg) by mouth early in the morning.., Disp: , Rfl:     mecobalamin, vitamin B12, 1,000 mcg tablet,disintegrating, Place 1 tablet under the tongue once daily., Disp: , Rfl:     metFORMIN (Glucophage) 1,000 mg tablet, Take 1 tablet (1,000 mg) by mouth 2 times daily (morning and late afternoon)., Disp: , Rfl:     nitroglycerin (Nitrostat) 0.4 mg SL tablet, Place under the tongue. Place 1 tablet under the tongue every 5 minutes up to 3 doses as needed for chest pain. Call 911 if pain persists, Disp: , Rfl:     aspirin 81 mg EC tablet, Take 1 tablet (81 mg) by mouth every other day., Disp: , Rfl:                      Assessment/Plan   1. Angina pectoris  Nuclear Stress Test      2. Coronary artery disease, occlusive  Follow Up In Cardiology    Nuclear Stress Test    aspirin 81 mg EC tablet    Vascular US Carotid Artery Duplex Bilateral      3. Essential hypertension  Follow Up In Cardiology      4. Hyperlipidemia, unspecified hyperlipidemia type  Follow Up In Cardiology      5. Multiple vessel coronary artery disease        6. Bilateral carotid artery stenosis  Vascular US Carotid Artery Duplex Bilateral      7. History of PTCA        8. Former smoker        9. Longstanding persistent atrial fibrillation (Multi)  ECG 12 Lead    Complete Pulmonary Function Test (Spirometry/DLCO/Lung Volumes)    Thyroid Stimulating Hormone    Thyroid Stimulating Hormone      10. Easy bruisability        11. High risk medication use                 Scribe Attestation  By signing my name below, Rebecca MALIK LPN, Scribe   attest that this documentation has been prepared under the direction and in the presence of Delma Edward MD.     Provider Attestation - Scribe documentation    All medical record entries made by the Scribe were at my direction and personally dictated by me. I have reviewed the chart and agree  that the record accurately reflects my personal performance of the history, physical exam, discussion and plan.

## 2024-11-12 ENCOUNTER — OFFICE VISIT (OUTPATIENT)
Dept: FAMILY MEDICINE CLINIC | Age: 74
End: 2024-11-12

## 2024-11-12 VITALS
BODY MASS INDEX: 26.84 KG/M2 | OXYGEN SATURATION: 97 % | TEMPERATURE: 97.4 F | SYSTOLIC BLOOD PRESSURE: 144 MMHG | DIASTOLIC BLOOD PRESSURE: 72 MMHG | HEART RATE: 76 BPM | WEIGHT: 144 LBS

## 2024-11-12 DIAGNOSIS — K59.00 CONSTIPATION, UNSPECIFIED CONSTIPATION TYPE: ICD-10-CM

## 2024-11-12 DIAGNOSIS — N28.9 RENAL INSUFFICIENCY: ICD-10-CM

## 2024-11-12 DIAGNOSIS — E11.59 TYPE 2 DIABETES MELLITUS WITH OTHER CIRCULATORY COMPLICATION, WITHOUT LONG-TERM CURRENT USE OF INSULIN (HCC): ICD-10-CM

## 2024-11-12 DIAGNOSIS — M25.571 ACUTE RIGHT ANKLE PAIN: Primary | ICD-10-CM

## 2024-11-12 PROBLEM — I65.23 BILATERAL CAROTID ARTERY STENOSIS: Status: ACTIVE | Noted: 2022-05-09

## 2024-11-12 PROBLEM — N35.92 UNSPECIFIED URETHRAL STRICTURE, FEMALE: Status: ACTIVE | Noted: 2021-12-09

## 2024-11-12 PROBLEM — Z72.0 TOBACCO ABUSE: Status: ACTIVE | Noted: 2022-05-09

## 2024-11-12 PROBLEM — N39.46 MIXED INCONTINENCE: Status: ACTIVE | Noted: 2021-12-09

## 2024-11-12 PROBLEM — I10 HTN (HYPERTENSION): Status: ACTIVE | Noted: 2023-01-09

## 2024-11-12 PROBLEM — K55.20 ANGIODYSPLASIA OF SMALL INTESTINE: Status: ACTIVE | Noted: 2024-11-12

## 2024-11-12 LAB
ANION GAP SERPL CALCULATED.3IONS-SCNC: 11 MEQ/L (ref 9–15)
BUN SERPL-MCNC: 13 MG/DL (ref 8–23)
CALCIUM SERPL-MCNC: 9.9 MG/DL (ref 8.5–9.9)
CHLORIDE SERPL-SCNC: 103 MEQ/L (ref 95–107)
CO2 SERPL-SCNC: 28 MEQ/L (ref 20–31)
CREAT SERPL-MCNC: 1.01 MG/DL (ref 0.5–0.9)
GLUCOSE SERPL-MCNC: 97 MG/DL (ref 70–99)
POTASSIUM SERPL-SCNC: 4.9 MEQ/L (ref 3.4–4.9)
SODIUM SERPL-SCNC: 142 MEQ/L (ref 135–144)

## 2024-11-12 ASSESSMENT — ENCOUNTER SYMPTOMS
CHEST TIGHTNESS: 0
ABDOMINAL DISTENTION: 0
PHOTOPHOBIA: 0
ABDOMINAL PAIN: 0
SHORTNESS OF BREATH: 0

## 2024-11-12 NOTE — PROGRESS NOTES
LIPITOR     Compressor/Nebulizer Misc  1 inhalation by Does not apply route 4 times daily as needed (copd exac)     estradiol 0.1 MG/GM vaginal cream  Commonly known as: ESTRACE     fluticasone 50 MCG/ACT nasal spray  Commonly known as: FLONASE     fluticasone-umeclidin-vilant 100-62.5-25 MCG/ACT Aepb inhaler  Commonly known as: TRELEGY ELLIPTA     Handicap Placard Misc  by Does not apply route Pt cannot ambulate > 100 ft without rest    Good for 5 years     ibuprofen 600 MG tablet  Commonly known as: ADVIL;MOTRIN  Take 1 tablet by mouth 3 times daily as needed for Pain     IRON 100 PLUS PO     lisinopril 5 MG tablet  Commonly known as: PRINIVIL;ZESTRIL  TAKE ONE TABLET BY MOUTH DAILY     metFORMIN 1000 MG tablet  Commonly known as: GLUCOPHAGE  TAKE 1 TABLET BY MOUTH TWICE  DAILY WITH MEALS     nitroGLYCERIN 0.4 MG/HR  Commonly known as: NITRODUR     sotalol 80 MG tablet  Commonly known as: BETAPACE     vitamin B-12 100 MCG tablet  Commonly known as: CYANOCOBALAMIN  Take 1 tablet by mouth daily     zinc 50 MG Tabs tablet                Plan:   Return for MAW exam due feb 2025.    Patient Instructions   Patient will have x-ray done to evaluate ankle.    BMP is due for monitoring renal insufficiency.  Will be drawn today.    Constipation is resolved with current treatment.    Medicare annual wellness is due after January    This note was partially created with the assistance of dictation.  This may lead to grammatical or spelling errors.      Noé Herring M.D.

## 2024-11-12 NOTE — PATIENT INSTRUCTIONS
Patient will have x-ray done to evaluate ankle.    [facility] is due for monitoring renal insufficiency.  Will be drawn today.    Constipation is resolved with current treatment.    Medicare annual wellness is due after January

## 2024-11-13 LAB — NON-UH HIE THYROID STIMULATING HORMONE: 1.41 U[IU]/ML (ref 0.45–5.33)

## 2024-11-14 ENCOUNTER — OFFICE VISIT (OUTPATIENT)
Dept: FAMILY MEDICINE CLINIC | Age: 74
End: 2024-11-14

## 2024-11-14 VITALS
HEART RATE: 82 BPM | SYSTOLIC BLOOD PRESSURE: 128 MMHG | BODY MASS INDEX: 27.19 KG/M2 | TEMPERATURE: 98.2 F | OXYGEN SATURATION: 95 % | WEIGHT: 144 LBS | DIASTOLIC BLOOD PRESSURE: 56 MMHG | HEIGHT: 61 IN

## 2024-11-14 DIAGNOSIS — M25.571 ACUTE RIGHT ANKLE PAIN: Primary | ICD-10-CM

## 2024-11-14 RX ORDER — IBUPROFEN 600 MG/1
600 TABLET, FILM COATED ORAL 3 TIMES DAILY PRN
Qty: 30 TABLET | Refills: 0 | Status: SHIPPED | OUTPATIENT
Start: 2024-11-14 | End: 2024-11-24

## 2024-11-14 NOTE — RESULT ENCOUNTER NOTE
There are some abnormalities that are consistent with arthritis and bone spurs but no evidence of fracture

## 2024-11-14 NOTE — PROGRESS NOTES
Mood and Affect: Mood and affect normal.         Speech: Speech normal.         Behavior: Behavior normal. Behavior is cooperative.         Thought Content: Thought content normal.         Cognition and Memory: Cognition and memory normal.         Judgment: Judgment normal.       Assessment:       Diagnosis Orders   1. Acute right ankle pain  Ambulatory referral to Orthopedic Surgery    [facility] [employer] Walker Boot, Air Select Low Top, Right(); MD (M7-10/F8-11)    Scooter MISC        No results found for this visit on 11/14/24.   Plan:     Assessment & Plan   Mer was seen today for swelling.    Diagnoses and all orders for this visit:    Acute right ankle pain  -     Ambulatory referral to Orthopedic Surgery  -     [facility] Walker Boot, Air Select Low Top, Right(); MD (M7-10/F8-11)  -     Scooter MISC; by Does not apply route Right leg scooter  DX right ankle injury    Other orders  -     ibuprofen (ADVIL;MOTRIN) 600 MG tablet; Take 1 tablet by mouth 3 times daily as needed for Pain    Discussed with patient will cont NSAID.   Placed in walking boot and advised to cont with ice and rest.   Referral also send and patient will see Ortho for further evaluation.   Orders Placed This Encounter   Procedures    Ambulatory referral to Orthopedic Surgery     Referral Priority:   Routine     Referral Type:   Eval and Treat     Referral Reason:   Specialty Services Required     Referred to Provider:   Brian Edward MD     Requested Specialty:   Orthopaedic Surgery     Number of Visits Requested:   1    [facility] Walker Boot, Air Select Low Top, Right(); MD (M7-10/F8-11)     Order Specific Question:   Equipment:     Answer:   Walker Boot, Air Select Low Top, Right()     Order Specific Question:   Size     Answer:   MD (M7-10/F8-11)     Orders Placed This Encounter   Medications    ibuprofen (ADVIL;MOTRIN) 600 MG tablet     Sig: Take 1

## 2024-11-19 ENCOUNTER — OFFICE VISIT (OUTPATIENT)
Dept: ORTHOPEDIC SURGERY | Age: 74
End: 2024-11-19
Payer: MEDICARE

## 2024-11-19 VITALS
TEMPERATURE: 97.6 F | OXYGEN SATURATION: 94 % | HEIGHT: 61 IN | BODY MASS INDEX: 27.19 KG/M2 | HEART RATE: 64 BPM | WEIGHT: 144 LBS

## 2024-11-19 DIAGNOSIS — S93.401A SPRAIN OF RIGHT ANKLE, UNSPECIFIED LIGAMENT, INITIAL ENCOUNTER: Primary | ICD-10-CM

## 2024-11-19 PROCEDURE — 4004F PT TOBACCO SCREEN RCVD TLK: CPT | Performed by: STUDENT IN AN ORGANIZED HEALTH CARE EDUCATION/TRAINING PROGRAM

## 2024-11-19 PROCEDURE — 1159F MED LIST DOCD IN RCRD: CPT | Performed by: STUDENT IN AN ORGANIZED HEALTH CARE EDUCATION/TRAINING PROGRAM

## 2024-11-19 PROCEDURE — G8417 CALC BMI ABV UP PARAM F/U: HCPCS | Performed by: STUDENT IN AN ORGANIZED HEALTH CARE EDUCATION/TRAINING PROGRAM

## 2024-11-19 PROCEDURE — 1123F ACP DISCUSS/DSCN MKR DOCD: CPT | Performed by: STUDENT IN AN ORGANIZED HEALTH CARE EDUCATION/TRAINING PROGRAM

## 2024-11-19 PROCEDURE — G8484 FLU IMMUNIZE NO ADMIN: HCPCS | Performed by: STUDENT IN AN ORGANIZED HEALTH CARE EDUCATION/TRAINING PROGRAM

## 2024-11-19 PROCEDURE — G8400 PT W/DXA NO RESULTS DOC: HCPCS | Performed by: STUDENT IN AN ORGANIZED HEALTH CARE EDUCATION/TRAINING PROGRAM

## 2024-11-19 PROCEDURE — 99204 OFFICE O/P NEW MOD 45 MIN: CPT | Performed by: STUDENT IN AN ORGANIZED HEALTH CARE EDUCATION/TRAINING PROGRAM

## 2024-11-19 PROCEDURE — G8427 DOCREV CUR MEDS BY ELIG CLIN: HCPCS | Performed by: STUDENT IN AN ORGANIZED HEALTH CARE EDUCATION/TRAINING PROGRAM

## 2024-11-19 PROCEDURE — 1090F PRES/ABSN URINE INCON ASSESS: CPT | Performed by: STUDENT IN AN ORGANIZED HEALTH CARE EDUCATION/TRAINING PROGRAM

## 2024-11-19 NOTE — PROGRESS NOTES
time on the day of the encounter was 45 minutes (excluding any procedures, separately documented) and included the following activities:    Preparing to see the patient including review of previous tests and/or physician/referral notes  Obtaining and/or reviewing separately obtained history  Performing a medically appropriate examination and/or evaluation  Counseling and educating the patient/family/caregiver  Documenting clinical information in the electronic health record  Independently interpreting results and communicating results to the patient  Formulating a treatment plan and addressing patient risk factors/medical comorbidities that may affect outcome    Brian Edward MD  Orthopedic Surgery

## 2024-12-05 ENCOUNTER — HOSPITAL ENCOUNTER (OUTPATIENT)
Dept: RADIOLOGY | Facility: CLINIC | Age: 74
Discharge: HOME | End: 2024-12-05
Payer: MEDICARE

## 2024-12-05 ENCOUNTER — HOSPITAL ENCOUNTER (OUTPATIENT)
Dept: CARDIOLOGY | Facility: CLINIC | Age: 74
Discharge: HOME | End: 2024-12-05
Payer: MEDICARE

## 2024-12-05 VITALS — DIASTOLIC BLOOD PRESSURE: 78 MMHG | SYSTOLIC BLOOD PRESSURE: 128 MMHG | HEART RATE: 58 BPM

## 2024-12-05 DIAGNOSIS — I25.10 CORONARY ARTERY DISEASE, OCCLUSIVE: ICD-10-CM

## 2024-12-05 DIAGNOSIS — I20.9 ANGINA PECTORIS: ICD-10-CM

## 2024-12-05 PROCEDURE — 93017 CV STRESS TEST TRACING ONLY: CPT

## 2024-12-05 PROCEDURE — 2500000004 HC RX 250 GENERAL PHARMACY W/ HCPCS (ALT 636 FOR OP/ED): Performed by: INTERNAL MEDICINE

## 2024-12-05 PROCEDURE — 78452 HT MUSCLE IMAGE SPECT MULT: CPT | Performed by: INTERNAL MEDICINE

## 2024-12-05 PROCEDURE — 3430000001 HC RX 343 DIAGNOSTIC RADIOPHARMACEUTICALS: Performed by: INTERNAL MEDICINE

## 2024-12-05 PROCEDURE — 93018 CV STRESS TEST I&R ONLY: CPT | Performed by: INTERNAL MEDICINE

## 2024-12-05 PROCEDURE — A9502 TC99M TETROFOSMIN: HCPCS | Performed by: INTERNAL MEDICINE

## 2024-12-05 PROCEDURE — 78452 HT MUSCLE IMAGE SPECT MULT: CPT

## 2024-12-05 PROCEDURE — 93016 CV STRESS TEST SUPVJ ONLY: CPT | Performed by: INTERNAL MEDICINE

## 2024-12-05 RX ORDER — REGADENOSON 0.08 MG/ML
0.4 INJECTION, SOLUTION INTRAVENOUS ONCE
Status: COMPLETED | OUTPATIENT
Start: 2024-12-05 | End: 2024-12-05

## 2024-12-06 ENCOUNTER — TELEPHONE (OUTPATIENT)
Dept: CARDIOLOGY | Facility: CLINIC | Age: 74
End: 2024-12-06
Payer: MEDICARE

## 2024-12-06 NOTE — TELEPHONE ENCOUNTER
----- Message from Delma Edward sent at 12/6/2024 12:06 PM EST -----  No new orders. Please call patient with results.

## 2024-12-06 NOTE — TELEPHONE ENCOUNTER
Result Communication    Resulted Orders   Nuclear Stress Test    Narrative    Interpreted By:  Delma Edward and Giannuzzi Michael   STUDY:  MYOCARDIAL PERFUSION STRESS TEST WITH LEXISCAN      Performing facility:  [facility],  [address] [place] [address], [address],  [place] [address]  [facility] Provider:  Delma Edward MD  PCP:  Dr. KEYANA Woods  Supervising provider:  Matthias Ferro MD, Merged with [facility]      INDICATION:  CAD;  Angina pectoris      HISTORY:  Gender:  F; Age:  75 y/o ; Height:  .9 cm cm; Weight:   WT  65.499 kg kg.      High Cholesterol;  CAD;  Diabetes;  HTN;  Chest Pain;  Quit smoking less than 1 year ago.      Cardiac catheterization on 2001, 2013, 2015, 2021.  PTCA on 2001.      COMPARISON:  Previous nuclear testing completed [id number] at [facility].          ACCESSION NUMBER(S):  [id number]      ORDERING CLINICIAN:  DELMA EDWARD      TECHNIQUE:  ONE DAY protocol.  Stress injection: Date:12-5-24, 34.0 mCi of Myoview IV 20 seconds  after rapid injection of Lexiscan. Rest injection: Date: 12-5-24,  10.2 mCi of Myoview IV at rest. The patient had a rapid injection of  0.4 mg of Lexiscan IV over 10 seconds. Imaging was performed by  gated tomographic technique. Reason for Lexiscan:  SOB      STRESS TEST DATA:  Resting heart rate was 58 BPM.  Resting blood pressure was 128/78 mmHg.  Peak blood pressure was 118/82 mmHg.  Peak heart rate was 86 BPM.      TEST TERMINATED DUE TO:  Protocol completed      FINDINGS:  STRESS TEST RESULTS:      Resting electrocardiogram revealed normal sinus rhythm with  nonspecific ST-T changes. There were no significant ischemic ECG  changes or dysrhythmias. The patient did not have chest  pains/symptoms during procedure. There was a normal recovery phase.      IMAGING RESULTS:      Image quality was good.  Rest and stress tomographic images were reviewed and revealed normal  perfusion without evidence of ischemia, myocardial infarction, or  left  ventricular dilatation with stress. Overall left ventricular  systolic function appeared to be normal without regional wall motion  abnormalities. Ejection fraction was 76%.  TID is 0.92 and is normal.  There were no evidence of  attenuation artifact.        Impression    Normal [employer] Myoview cardiac perfusion stress test.  No evidence of ischemia or myocardial infarction by perfusion imaging.  Normal left ventricular systolic function, ejection fraction 76%.  When compared to previous study. The previously reported anterior  wall ischemia is no longer evident.      Signed by: Delma Edward 12/5/2024 5:22 PM  Dictation workstation:   [id number]

## 2024-12-11 ENCOUNTER — HOSPITAL ENCOUNTER (OUTPATIENT)
Dept: CARDIOLOGY | Facility: CLINIC | Age: 74
Discharge: HOME | End: 2024-12-11
Payer: MEDICARE

## 2024-12-11 DIAGNOSIS — I25.10 CORONARY ARTERY DISEASE, OCCLUSIVE: ICD-10-CM

## 2024-12-11 DIAGNOSIS — I65.23 BILATERAL CAROTID ARTERY STENOSIS: ICD-10-CM

## 2024-12-11 PROCEDURE — 93880 EXTRACRANIAL BILAT STUDY: CPT

## 2024-12-11 PROCEDURE — 93880 EXTRACRANIAL BILAT STUDY: CPT | Performed by: INTERNAL MEDICINE

## 2024-12-12 ENCOUNTER — TELEPHONE (OUTPATIENT)
Dept: CARDIOLOGY | Facility: CLINIC | Age: 74
End: 2024-12-12
Payer: MEDICARE

## 2024-12-12 NOTE — TELEPHONE ENCOUNTER
Result Communication    Resulted Orders   Vascular US Carotid Artery Duplex Bilateral    Narrative                   [facility]  [address], [address], [place] [address]          Tel [phone] Fax [phone]       Vascular Lab Report     [facility] US CAROTID ARTERY DUPLEX BILATERAL    Patient Name:      SCOOTER BROWN           [place]aze Physician:  [id number] Matthias Ferro MD, [facility]  Study Date:        12/11/2024           Ordering Provider:  [id number] DIANA CURRY  MRN/PID:           [id number]             Fellow:  Accession#:        [id number]         Technologist:       Margaret Roy                                                              RDCS, RDMS, RVT  Date of Birth/Age: 1950 / 74 years Technologist 2:  Gender:            F                    Encounter#:         [id number]  Admission Status:  Outpatient           Location Performed: [facility]       Diagnosis/ICD: Occlusion and stenosis of bilateral carotid arteries-I65.23  Indication:    Bilateral MISTY, HTN, CAD  CPT Codes:     43965 Cerebrovascular Carotid Duplex scan complete       CONCLUSIONS:  Right Carotid: Findings are consistent with less than 50% stenosis of the right proximal internal carotid artery. Laminar flow seen by color Doppler. Right external carotid artery appears patent with no evidence of stenosis. No evidence of hemodynamically significant stenosis of the right common carotid artery. The right vertebral artery is patent with antegrade flow. No significant changes since 2021.  Left Carotid: Findings are consistent with 50 to 69% stenosis of the left proximal internal carotid artery. Laminar flow seen by color Doppler. Left external carotid artery appears patent with no evidence of stenosis. No evidence of hemodynamically significant stenosis of the left common carotid artery. The  left vertebral artery is patent with antegrade flow. No significant changes since 2021.     Imaging & Doppler Findings:  Right Plaque Morph: The proximal right internal carotid artery demonstrates heterogenous, irregular and calcified plaque. The proximal right external carotid artery demonstrates irregular and calcified plaque. The distal right common carotid artery demonstrates irregular, heterogenous and calcified plaque.  Left Plaque Morph: The proximal left internal carotid artery demonstrates irregular and calcified plaque. The distal left common carotid artery demonstrates irregular and calcified plaque.      Right                       Left    PSV      EDV               PSV      EDV  71 cm/s  12 cm/s   CCA P   78 cm/s  18 cm/s  55 cm/s  13 cm/s   CCA M   78 cm/s  17 cm/s  50 cm/s  11 cm/s   CCA D   59 cm/s  17 cm/s  109 cm/s 27 cm/s   ICA P   154 cm/s 40 cm/s  113 cm/s 25 cm/s   ICA M   122 cm/s 22 cm/s  44 cm/s  17 cm/s   ICA D   81 cm/s  20 cm/s  103 cm/s            ECA    87 cm/s  71 cm/s          Vertebral 68 cm/s                     Right Left  ICA/CCA Ratio  2.2  2.6          [id number] Matthias Ferro MD, FACC  Electronically signed by [id number] Matthias Ferro MD, FACC on 12/11/2024 at 6:58:27 PM         ** Final **         1:39 PM      Results were successfully communicated with the patient and they acknowledged their understanding.

## 2024-12-12 NOTE — TELEPHONE ENCOUNTER
----- Message from Delma Edward sent at 12/12/2024 12:57 PM EST -----  Advise mild to moderate bilateral carotid disease no new order same treatment  ----- Message -----  From: Tina Syngo - Cardiology Results In  Sent: 12/11/2024   6:58 PM EST  To: Delma Edward MD

## 2024-12-23 ENCOUNTER — OFFICE VISIT (OUTPATIENT)
Dept: FAMILY MEDICINE CLINIC | Age: 74
End: 2024-12-23

## 2024-12-23 VITALS
SYSTOLIC BLOOD PRESSURE: 116 MMHG | OXYGEN SATURATION: 95 % | WEIGHT: 142.6 LBS | HEART RATE: 66 BPM | BODY MASS INDEX: 26.92 KG/M2 | DIASTOLIC BLOOD PRESSURE: 62 MMHG | HEIGHT: 61 IN

## 2024-12-23 DIAGNOSIS — R06.02 SOB (SHORTNESS OF BREATH): ICD-10-CM

## 2024-12-23 DIAGNOSIS — J18.9 WALKING PNEUMONIA: Primary | ICD-10-CM

## 2024-12-23 LAB
ALBUMIN SERPL-MCNC: 4.1 G/DL (ref 3.5–4.6)
ALP SERPL-CCNC: 90 U/L (ref 40–130)
ALT SERPL-CCNC: 13 U/L (ref 0–33)
ANION GAP SERPL CALCULATED.3IONS-SCNC: 11 MEQ/L (ref 9–15)
AST SERPL-CCNC: 16 U/L (ref 0–35)
BASOPHILS # BLD: 0.1 K/UL (ref 0–0.2)
BASOPHILS NFR BLD: 1 %
BILIRUB SERPL-MCNC: 0.4 MG/DL (ref 0.2–0.7)
BNP BLD-MCNC: 1026 PG/ML
BUN SERPL-MCNC: 19 MG/DL (ref 8–23)
CALCIUM SERPL-MCNC: 9.2 MG/DL (ref 8.5–9.9)
CHLORIDE SERPL-SCNC: 106 MEQ/L (ref 95–107)
CO2 SERPL-SCNC: 25 MEQ/L (ref 20–31)
CREAT SERPL-MCNC: 1.57 MG/DL (ref 0.5–0.9)
EOSINOPHIL # BLD: 0.4 K/UL (ref 0–0.7)
EOSINOPHIL NFR BLD: 4.8 %
ERYTHROCYTE [DISTWIDTH] IN BLOOD BY AUTOMATED COUNT: 16.4 % (ref 11.5–14.5)
GLOBULIN SER CALC-MCNC: 2.2 G/DL (ref 2.3–3.5)
GLUCOSE FASTING: 97 MG/DL (ref 70–99)
HCT VFR BLD AUTO: 35.7 % (ref 37–47)
HGB BLD-MCNC: 11.6 G/DL (ref 12–16)
LYMPHOCYTES # BLD: 1.8 K/UL (ref 1–4.8)
LYMPHOCYTES NFR BLD: 20.4 %
MCH RBC QN AUTO: 29.4 PG (ref 27–31.3)
MCHC RBC AUTO-ENTMCNC: 32.5 % (ref 33–37)
MCV RBC AUTO: 90.6 FL (ref 79.4–94.8)
MONOCYTES # BLD: 0.5 K/UL (ref 0.2–0.8)
MONOCYTES NFR BLD: 6 %
NEUTROPHILS # BLD: 6 K/UL (ref 1.4–6.5)
NEUTS SEG NFR BLD: 67 %
PLATELET # BLD AUTO: 213 K/UL (ref 130–400)
POTASSIUM SERPL-SCNC: 4.8 MEQ/L (ref 3.4–4.9)
PROT SERPL-MCNC: 6.3 G/DL (ref 6.3–8)
RBC # BLD AUTO: 3.94 M/UL (ref 4.2–5.4)
SODIUM SERPL-SCNC: 142 MEQ/L (ref 135–144)
WBC # BLD AUTO: 8.9 K/UL (ref 4.8–10.8)

## 2024-12-23 RX ORDER — AZITHROMYCIN 250 MG/1
TABLET, FILM COATED ORAL
COMMUNITY
Start: 2024-12-16

## 2024-12-23 RX ORDER — AMIODARONE HYDROCHLORIDE 200 MG/1
TABLET ORAL
COMMUNITY
Start: 2024-11-20

## 2024-12-23 RX ORDER — GUAIFENESIN 600 MG/1
TABLET, EXTENDED RELEASE ORAL
COMMUNITY
Start: 2024-12-16

## 2024-12-23 RX ORDER — PREDNISONE 20 MG/1
TABLET ORAL
COMMUNITY
Start: 2024-12-16

## 2024-12-23 ASSESSMENT — ENCOUNTER SYMPTOMS
VOMITING: 0
ABDOMINAL PAIN: 0
SINUS PRESSURE: 0
SORE THROAT: 0
CHEST TIGHTNESS: 1
CONSTIPATION: 0
COUGH: 1
DIARRHEA: 0
WHEEZING: 1
SINUS PAIN: 0
RHINORRHEA: 1
SHORTNESS OF BREATH: 1
NAUSEA: 0
TROUBLE SWALLOWING: 0

## 2024-12-23 NOTE — PROGRESS NOTES
Date of Visit:  2024  Patient Name: Mer Mujica   Patient :  1950     CHIEF COMPLAINT:     Mer Mujica is a 74 y.o. female who presents today for an general visit to be evaluated for the following condition(s):  Chief Complaint   Patient presents with    Follow-Up from [facility] 2024-2024. C/o SOB and cough.   She was on guaifenesin, azithromycin and prednisone. She states is feeling very tired continues with the cough and SOB.        HISTORY OF PRESENT ILLNESS     I reviewed staff HPI/chief complaint and do agree with above    Patient presents today for concerns of hospital follow-up in which she was admitted from 2024 to 2024 at that time was having symptoms of shortness of breath and cough and diagnosed with walking pneumonia.  Of note patient does have a past medical history significant for COPD, hypertension, heart failure with reduced ejection fraction, cardiomyopathy, atrial fibrillation, type 2 diabetes mellitus, iron deficient anemia, hyperlipidemia.  Patient still have reports of nonproductive cough, mild shortness of breath that is slightly worsened with activity, and fatigue.  She did receive IV antibiotics while in the hospital as well as IV steroids, was discharged on prednisone and course of azithromycin as well as Mucinex and has finished these medications.  States while being treated for the walking pneumonia never really noticed an improvement in symptoms.  She denies any noted fever/chills, appetite changes, nasal congestion, pharyngitis, abdominal pain/discomfort, any nausea/vomiting/diarrhea.  She is continuing to use her trilogy inhaler daily and also requiring nebulizer treatments every 6 hours at home.        REVIEW OF SYSTEM      Review of Systems   Constitutional:  Positive for fatigue. Negative for appetite change, chills and fever.   HENT:  Positive for rhinorrhea. Negative for congestion, ear pain, hearing loss,

## 2024-12-30 DIAGNOSIS — N18.32 STAGE 3B CHRONIC KIDNEY DISEASE (HCC): Primary | ICD-10-CM

## 2025-01-14 ENCOUNTER — TELEPHONE (OUTPATIENT)
Dept: FAMILY MEDICINE CLINIC | Age: 75
End: 2025-01-14

## 2025-01-14 DIAGNOSIS — N18.32 STAGE 3B CHRONIC KIDNEY DISEASE (HCC): Primary | ICD-10-CM

## 2025-01-14 NOTE — TELEPHONE ENCOUNTER
Pt calling asking for a referral to Dr Herrera for Nephrology. Said she can't schedule an appt without a referral

## 2025-01-15 NOTE — TELEPHONE ENCOUNTER
There was an order created by Quirino Garcia.  Seems to be inactivated for some reason.  Please reactivate and send.  Notify patient

## 2025-02-04 ENCOUNTER — TRANSCRIBE ORDERS (OUTPATIENT)
Dept: ADMINISTRATIVE | Age: 75
End: 2025-02-04

## 2025-02-04 DIAGNOSIS — I48.91 ATRIAL FIBRILLATION, UNSPECIFIED TYPE (HCC): ICD-10-CM

## 2025-02-04 DIAGNOSIS — I15.9 SECONDARY HYPERTENSION: ICD-10-CM

## 2025-02-04 DIAGNOSIS — N18.30 STAGE 3 CHRONIC KIDNEY DISEASE, UNSPECIFIED WHETHER STAGE 3A OR 3B CKD (HCC): ICD-10-CM

## 2025-02-04 DIAGNOSIS — E13.69 OTHER SPECIFIED DIABETES MELLITUS WITH OTHER SPECIFIED COMPLICATION, UNSPECIFIED WHETHER LONG TERM INSULIN USE (HCC): Primary | ICD-10-CM

## 2025-02-06 ENCOUNTER — HOSPITAL ENCOUNTER (OUTPATIENT)
Dept: ULTRASOUND IMAGING | Age: 75
Discharge: HOME | End: 2025-02-08
Attending: INTERNAL MEDICINE
Payer: MEDICARE

## 2025-02-06 DIAGNOSIS — I48.91 ATRIAL FIBRILLATION, UNSPECIFIED TYPE (HCC): ICD-10-CM

## 2025-02-06 DIAGNOSIS — I15.9 SECONDARY HYPERTENSION: ICD-10-CM

## 2025-02-06 DIAGNOSIS — N18.30 STAGE 3 CHRONIC KIDNEY DISEASE, UNSPECIFIED WHETHER STAGE 3A OR 3B CKD (HCC): ICD-10-CM

## 2025-02-06 DIAGNOSIS — E13.69 OTHER SPECIFIED DIABETES MELLITUS WITH OTHER SPECIFIED COMPLICATION, UNSPECIFIED WHETHER LONG TERM INSULIN USE (HCC): ICD-10-CM

## 2025-02-06 PROCEDURE — 76775 US EXAM ABDO BACK WALL LIM: CPT

## 2025-02-27 ENCOUNTER — TELEPHONE (OUTPATIENT)
Dept: FAMILY MEDICINE CLINIC | Age: 75
End: 2025-02-27

## 2025-03-20 DIAGNOSIS — I48.0 PAROXYSMAL ATRIAL FIBRILLATION (MULTI): ICD-10-CM

## 2025-03-21 RX ORDER — AMIODARONE HYDROCHLORIDE 200 MG/1
200 TABLET ORAL DAILY
Qty: 90 TABLET | Refills: 1 | Status: SHIPPED | OUTPATIENT
Start: 2025-03-21

## 2025-05-06 ENCOUNTER — OFFICE VISIT (OUTPATIENT)
Age: 75
End: 2025-05-06
Payer: MEDICARE

## 2025-05-06 VITALS
HEART RATE: 52 BPM | BODY MASS INDEX: 27.56 KG/M2 | DIASTOLIC BLOOD PRESSURE: 50 MMHG | WEIGHT: 146 LBS | HEIGHT: 61 IN | SYSTOLIC BLOOD PRESSURE: 110 MMHG | TEMPERATURE: 97.5 F | OXYGEN SATURATION: 99 %

## 2025-05-06 DIAGNOSIS — E78.00 PURE HYPERCHOLESTEROLEMIA: Chronic | ICD-10-CM

## 2025-05-06 DIAGNOSIS — Z12.11 COLON CANCER SCREENING: ICD-10-CM

## 2025-05-06 DIAGNOSIS — D64.9 ANEMIA, UNSPECIFIED TYPE: ICD-10-CM

## 2025-05-06 DIAGNOSIS — E11.59 TYPE 2 DIABETES MELLITUS WITH OTHER CIRCULATORY COMPLICATION, WITHOUT LONG-TERM CURRENT USE OF INSULIN (HCC): Primary | ICD-10-CM

## 2025-05-06 DIAGNOSIS — J43.9 PULMONARY EMPHYSEMA, UNSPECIFIED EMPHYSEMA TYPE (HCC): ICD-10-CM

## 2025-05-06 DIAGNOSIS — I10 PRIMARY HYPERTENSION: ICD-10-CM

## 2025-05-06 DIAGNOSIS — E11.59 TYPE 2 DIABETES MELLITUS WITH OTHER CIRCULATORY COMPLICATION, WITHOUT LONG-TERM CURRENT USE OF INSULIN (HCC): ICD-10-CM

## 2025-05-06 DIAGNOSIS — I50.20 HEART FAILURE WITH REDUCED EJECTION FRACTION (HCC): ICD-10-CM

## 2025-05-06 PROCEDURE — G8427 DOCREV CUR MEDS BY ELIG CLIN: HCPCS | Performed by: FAMILY MEDICINE

## 2025-05-06 PROCEDURE — 99214 OFFICE O/P EST MOD 30 MIN: CPT | Performed by: FAMILY MEDICINE

## 2025-05-06 PROCEDURE — 3023F SPIROM DOC REV: CPT | Performed by: FAMILY MEDICINE

## 2025-05-06 PROCEDURE — 3078F DIAST BP <80 MM HG: CPT | Performed by: FAMILY MEDICINE

## 2025-05-06 PROCEDURE — 1090F PRES/ABSN URINE INCON ASSESS: CPT | Performed by: FAMILY MEDICINE

## 2025-05-06 PROCEDURE — 1160F RVW MEDS BY RX/DR IN RCRD: CPT | Performed by: FAMILY MEDICINE

## 2025-05-06 PROCEDURE — G8400 PT W/DXA NO RESULTS DOC: HCPCS | Performed by: FAMILY MEDICINE

## 2025-05-06 PROCEDURE — 1159F MED LIST DOCD IN RCRD: CPT | Performed by: FAMILY MEDICINE

## 2025-05-06 PROCEDURE — 3074F SYST BP LT 130 MM HG: CPT | Performed by: FAMILY MEDICINE

## 2025-05-06 PROCEDURE — G8417 CALC BMI ABV UP PARAM F/U: HCPCS | Performed by: FAMILY MEDICINE

## 2025-05-06 PROCEDURE — 1036F TOBACCO NON-USER: CPT | Performed by: FAMILY MEDICINE

## 2025-05-06 PROCEDURE — 99213 OFFICE O/P EST LOW 20 MIN: CPT | Performed by: FAMILY MEDICINE

## 2025-05-06 PROCEDURE — 1123F ACP DISCUSS/DSCN MKR DOCD: CPT | Performed by: FAMILY MEDICINE

## 2025-05-06 RX ORDER — METOPROLOL SUCCINATE 50 MG/1
50 TABLET, EXTENDED RELEASE ORAL DAILY
COMMUNITY

## 2025-05-06 RX ORDER — CYANOCOBALAMIN 1000 UG/ML
1000 INJECTION, SOLUTION INTRAMUSCULAR; SUBCUTANEOUS ONCE
COMMUNITY

## 2025-05-06 RX ORDER — VALSARTAN AND HYDROCHLOROTHIAZIDE 80; 12.5 MG/1; MG/1
1 TABLET, FILM COATED ORAL DAILY
COMMUNITY

## 2025-05-06 SDOH — ECONOMIC STABILITY: FOOD INSECURITY: WITHIN THE PAST 12 MONTHS, YOU WORRIED THAT YOUR FOOD WOULD RUN OUT BEFORE YOU GOT MONEY TO BUY MORE.: NEVER TRUE

## 2025-05-06 SDOH — ECONOMIC STABILITY: FOOD INSECURITY: WITHIN THE PAST 12 MONTHS, THE FOOD YOU BOUGHT JUST DIDN'T LAST AND YOU DIDN'T HAVE MONEY TO GET MORE.: NEVER TRUE

## 2025-05-06 ASSESSMENT — ENCOUNTER SYMPTOMS
COUGH: 0
VOMITING: 0
DIARRHEA: 0

## 2025-05-06 ASSESSMENT — PATIENT HEALTH QUESTIONNAIRE - PHQ9
SUM OF ALL RESPONSES TO PHQ QUESTIONS 1-9: 0
2. FEELING DOWN, DEPRESSED OR HOPELESS: NOT AT ALL
SUM OF ALL RESPONSES TO PHQ QUESTIONS 1-9: 0
SUM OF ALL RESPONSES TO PHQ QUESTIONS 1-9: 0
1. LITTLE INTEREST OR PLEASURE IN DOING THINGS: NOT AT ALL
SUM OF ALL RESPONSES TO PHQ QUESTIONS 1-9: 0

## 2025-05-06 NOTE — PROGRESS NOTES
Subjective:      Patient ID: Mer Mujica is a 75 y.o. female    Diabetes  She presents for her follow-up diabetic visit. She has type 2 diabetes mellitus. Pertinent negatives for diabetes include no weakness. Current diabetic treatment includes oral agent (monotherapy).   Hypertension  The current episode started more than 1 year ago. Past treatments include angiotensin blockers, diuretics and beta blockers. The current treatment provides moderate improvement.   Hyperlipidemia  The current episode started more than 1 year ago. Current antihyperlipidemic treatment includes statins. The current treatment provides moderate improvement of lipids.     Here to establish.  Previously seen in [place] office.  Mostly seen by specialists including cardiology and pulmonary and hematology.   Fasting glucose around 130.   At least 3 months since low blood sugar reactions would like to change to specialists that are closer as she does not wish to drive far away.      Review of Systems   Constitutional:  Negative for chills and fever.   Respiratory:  Negative for cough.    Gastrointestinal:  Negative for diarrhea and vomiting.   Neurological:  Negative for weakness.     Reviewed allergy, medical, social, surgical, family and med list changes and updated   Files     Social History     Socioeconomic History    Marital status:      Spouse name: None    Number of children: None    Years of education: None    Highest education level: None   Tobacco Use    Smoking status: Former     Current packs/day: 0.00     Average packs/day: 0.3 packs/day for 50.0 years (12.5 ttl pk-yrs)     Types: Cigarettes     Quit date: 2024     Years since quittin.0    Smokeless tobacco: Never   Vaping Use    Vaping status: Never Used   Substance and Sexual Activity    Alcohol use: No    Drug use: No    Sexual activity: Not Currently     Social Drivers of Health     Financial Resource Strain: Low Risk  (2024)    Overall Financial Resource

## 2025-05-07 ENCOUNTER — RESULTS FOLLOW-UP (OUTPATIENT)
Age: 75
End: 2025-05-07

## 2025-05-07 LAB
ESTIMATED AVERAGE GLUCOSE: 131 MG/DL
HBA1C MFR BLD: 6.2 % (ref 4–6)

## 2025-05-09 ENCOUNTER — OFFICE VISIT (OUTPATIENT)
Age: 75
End: 2025-05-09
Payer: MEDICARE

## 2025-05-09 VITALS
SYSTOLIC BLOOD PRESSURE: 108 MMHG | HEART RATE: 86 BPM | DIASTOLIC BLOOD PRESSURE: 62 MMHG | RESPIRATION RATE: 15 BRPM | WEIGHT: 146 LBS | OXYGEN SATURATION: 96 % | TEMPERATURE: 97.4 F | HEIGHT: 61 IN | BODY MASS INDEX: 27.56 KG/M2

## 2025-05-09 DIAGNOSIS — J44.9 CHRONIC OBSTRUCTIVE PULMONARY DISEASE, UNSPECIFIED COPD TYPE (HCC): ICD-10-CM

## 2025-05-09 DIAGNOSIS — R91.1 LUNG NODULE: Primary | ICD-10-CM

## 2025-05-09 DIAGNOSIS — J43.9 PULMONARY EMPHYSEMA, UNSPECIFIED EMPHYSEMA TYPE (HCC): ICD-10-CM

## 2025-05-09 PROCEDURE — 99213 OFFICE O/P EST LOW 20 MIN: CPT | Performed by: INTERNAL MEDICINE

## 2025-05-09 NOTE — PROGRESS NOTES
NEW PATIENT VISIT-PULMONARY/SLEEP    5/9/2025     REFERRING PHYSICIAN:  Rubio Zendejas MD     REASON FOR REFERRAL: Emphysema    HPI:     Mer Mujica is a 75 y.o. female who was referred to pulmonary clinic for evaluation.     She comes to establish new pulmonologist.  She has been seen in the past by Dr. Platt at [place].  She has been told that she has COPD.  She has been on Trelegy for couple years.  Had a PFT last year which showed mild airflow limitation.  She has a rescue inhaler that she uses very infrequently.    Had a CT chest at the [facility] in November 2024.  I was able to review the results.  There are multiple nodules which reported to be stable.  The largest 1 is measuring 9 mm in left lower lobe, there is an 8 mm nodule in right lower lobe with 7 mm nodule in right upper lobe.     Smoking history-quit smoking a year ago.          Past Medical History   Past Medical History:   Diagnosis Date    Arthritis     Breast cancer (HCC) 1989    right breast    CAD (coronary artery disease)     cardiac stents x 3    Cancer (HCC)     right breast    COPD (chronic obstructive pulmonary disease) (HCC)     smoker since age 14    Diabetes mellitus (HCC)     hx > 30 yrs    GERD (gastroesophageal reflux disease)     History of blood transfusion 07/2019    blood transfusions x 3 due to lower GI bleed / [facility].    Hx antineoplastic chemo 1989    right breast cancer    Hyperlipidemia     meds > 10 yrs    Hypertension     meds > 10 yrs    PVD (peripheral vascular disease)     both legs       Past Surgical History  Past Surgical History:   Procedure Laterality Date    BREAST SURGERY Right 1996    mastectomy due to malignancy / chemo to follow    CARDIAC CATHETERIZATION      COLONOSCOPY  08/25/2016    AILEEN SETHI MD    CORONARY ANGIOPLASTY WITH STENT PLACEMENT      cardiac stents x 3    ENDOSCOPY, COLON, DIAGNOSTIC      EYE SURGERY      Phaco with IOL OS    HYSTERECTOMY (CERVIX STATUS

## 2025-05-21 ENCOUNTER — OFFICE VISIT (OUTPATIENT)
Age: 75
End: 2025-05-21
Payer: MEDICARE

## 2025-05-21 VITALS
WEIGHT: 147 LBS | DIASTOLIC BLOOD PRESSURE: 78 MMHG | HEIGHT: 60 IN | OXYGEN SATURATION: 99 % | TEMPERATURE: 97.6 F | SYSTOLIC BLOOD PRESSURE: 120 MMHG | HEART RATE: 78 BPM | BODY MASS INDEX: 28.86 KG/M2

## 2025-05-21 DIAGNOSIS — E78.00 PURE HYPERCHOLESTEROLEMIA: ICD-10-CM

## 2025-05-21 DIAGNOSIS — Z00.00 MEDICARE ANNUAL WELLNESS VISIT, SUBSEQUENT: Primary | ICD-10-CM

## 2025-05-21 DIAGNOSIS — J43.9 PULMONARY EMPHYSEMA, UNSPECIFIED EMPHYSEMA TYPE (HCC): ICD-10-CM

## 2025-05-21 DIAGNOSIS — I10 PRIMARY HYPERTENSION: ICD-10-CM

## 2025-05-21 DIAGNOSIS — E11.59 TYPE 2 DIABETES MELLITUS WITH OTHER CIRCULATORY COMPLICATION, WITHOUT LONG-TERM CURRENT USE OF INSULIN (HCC): ICD-10-CM

## 2025-05-21 DIAGNOSIS — N18.32 STAGE 3B CHRONIC KIDNEY DISEASE (HCC): ICD-10-CM

## 2025-05-21 DIAGNOSIS — I50.20 HEART FAILURE WITH REDUCED EJECTION FRACTION (HCC): ICD-10-CM

## 2025-05-21 DIAGNOSIS — Z13.6 SCREENING FOR CARDIOVASCULAR CONDITION: ICD-10-CM

## 2025-05-21 PROCEDURE — 1090F PRES/ABSN URINE INCON ASSESS: CPT | Performed by: FAMILY MEDICINE

## 2025-05-21 PROCEDURE — 3044F HG A1C LEVEL LT 7.0%: CPT | Performed by: FAMILY MEDICINE

## 2025-05-21 PROCEDURE — 99212 OFFICE O/P EST SF 10 MIN: CPT | Performed by: FAMILY MEDICINE

## 2025-05-21 PROCEDURE — 1123F ACP DISCUSS/DSCN MKR DOCD: CPT | Performed by: FAMILY MEDICINE

## 2025-05-21 PROCEDURE — G8400 PT W/DXA NO RESULTS DOC: HCPCS | Performed by: FAMILY MEDICINE

## 2025-05-21 PROCEDURE — G8417 CALC BMI ABV UP PARAM F/U: HCPCS | Performed by: FAMILY MEDICINE

## 2025-05-21 PROCEDURE — G8427 DOCREV CUR MEDS BY ELIG CLIN: HCPCS | Performed by: FAMILY MEDICINE

## 2025-05-21 PROCEDURE — 99213 OFFICE O/P EST LOW 20 MIN: CPT | Performed by: FAMILY MEDICINE

## 2025-05-21 PROCEDURE — G0439 PPPS, SUBSEQ VISIT: HCPCS | Performed by: FAMILY MEDICINE

## 2025-05-21 PROCEDURE — 1036F TOBACCO NON-USER: CPT | Performed by: FAMILY MEDICINE

## 2025-05-21 PROCEDURE — 3074F SYST BP LT 130 MM HG: CPT | Performed by: FAMILY MEDICINE

## 2025-05-21 PROCEDURE — 1159F MED LIST DOCD IN RCRD: CPT | Performed by: FAMILY MEDICINE

## 2025-05-21 PROCEDURE — 3023F SPIROM DOC REV: CPT | Performed by: FAMILY MEDICINE

## 2025-05-21 PROCEDURE — 1160F RVW MEDS BY RX/DR IN RCRD: CPT | Performed by: FAMILY MEDICINE

## 2025-05-21 PROCEDURE — 3078F DIAST BP <80 MM HG: CPT | Performed by: FAMILY MEDICINE

## 2025-05-21 ASSESSMENT — LIFESTYLE VARIABLES
HOW MANY STANDARD DRINKS CONTAINING ALCOHOL DO YOU HAVE ON A TYPICAL DAY: PATIENT DOES NOT DRINK
HOW OFTEN DO YOU HAVE A DRINK CONTAINING ALCOHOL: NEVER

## 2025-05-21 ASSESSMENT — PATIENT HEALTH QUESTIONNAIRE - PHQ9
SUM OF ALL RESPONSES TO PHQ QUESTIONS 1-9: 0
1. LITTLE INTEREST OR PLEASURE IN DOING THINGS: NOT AT ALL
2. FEELING DOWN, DEPRESSED OR HOPELESS: NOT AT ALL
SUM OF ALL RESPONSES TO PHQ QUESTIONS 1-9: 0

## 2025-05-21 NOTE — PROGRESS NOTES
Medicare Annual Wellness Visit    Mer Mujica is here for Diabetes, Hyperlipidemia, and Medicare AWV  Here in follow up from recent blood work and awv.     Assessment & Plan         Diagnosis Orders   1. Medicare annual wellness visit, subsequent        2. Type 2 diabetes mellitus with other circulatory complication, without long-term current use of insulin (HCC)  ESTABLISHED, LOW MDM, 20-29 MIN [50510]      3. Primary hypertension        4. Pure hypercholesterolemia        5. Pulmonary emphysema, unspecified emphysema type (HCC)        6. Stage 3b chronic kidney disease (HCC)        7. Heart failure with reduced ejection fraction (HCC)        8. Screening for cardiovascular condition  Lipid Panel         Orders Placed This Encounter   Procedures    Lipid Panel     Standing Status:   Future     Expected Date:   8/21/2025     Expiration Date:   5/21/2026    ESTABLISHED, LOW MDM, 20-29 MIN [16636]         Subjective       Patient's complete Health Risk Assessment and screening values have been reviewed and are found in Flowsheets. The following problems were reviewed today and where indicated follow up appointments were made and/or referrals ordered.    No Positive Risk Factors identified today.                                    Objective   Vitals:    05/21/25 0855   BP: 120/78   Pulse: 78   Temp: 97.6 °F (36.4 °C)   SpO2: 99%   Weight: 66.7 kg (147 lb)   Height: 1.524 m (5')      Body mass index is 28.71 kg/m².        General Appearance: in no acute distress and alert  Skin: warm and dry, no rash or erythema  Head: normocephalic and atraumatic  Eyes: pupils equal, round, and reactive to light, extraocular eye movements intact, conjunctivae normal  ENT: tympanic membrane, external ear and ear canal normal bilaterally, oropharynx clear and moist with normal mucous membranes  Neck: neck supple and non tender without mass, no thyromegaly or thyroid nodules, no cervical lymphadenopathy   Pulmonary/Chest: clear to

## 2025-05-29 ENCOUNTER — OFFICE VISIT (OUTPATIENT)
Age: 75
End: 2025-05-29
Payer: MEDICARE

## 2025-05-29 VITALS
OXYGEN SATURATION: 96 % | DIASTOLIC BLOOD PRESSURE: 58 MMHG | HEART RATE: 56 BPM | SYSTOLIC BLOOD PRESSURE: 114 MMHG | WEIGHT: 148.4 LBS | BODY MASS INDEX: 28.98 KG/M2

## 2025-05-29 DIAGNOSIS — I48.0 PAROXYSMAL ATRIAL FIBRILLATION (HCC): ICD-10-CM

## 2025-05-29 DIAGNOSIS — I42.9 CARDIOMYOPATHY, UNSPECIFIED TYPE (HCC): ICD-10-CM

## 2025-05-29 DIAGNOSIS — E78.00 PURE HYPERCHOLESTEROLEMIA: Chronic | ICD-10-CM

## 2025-05-29 DIAGNOSIS — I25.10 MULTIPLE VESSEL CORONARY ARTERY DISEASE: ICD-10-CM

## 2025-05-29 DIAGNOSIS — I10 HYPERTENSION, UNSPECIFIED TYPE: Primary | ICD-10-CM

## 2025-05-29 DIAGNOSIS — I48.91 ATRIAL FIBRILLATION, UNSPECIFIED TYPE (HCC): ICD-10-CM

## 2025-05-29 PROCEDURE — 1090F PRES/ABSN URINE INCON ASSESS: CPT | Performed by: INTERNAL MEDICINE

## 2025-05-29 PROCEDURE — G8417 CALC BMI ABV UP PARAM F/U: HCPCS | Performed by: INTERNAL MEDICINE

## 2025-05-29 PROCEDURE — G8400 PT W/DXA NO RESULTS DOC: HCPCS | Performed by: INTERNAL MEDICINE

## 2025-05-29 PROCEDURE — 93010 ELECTROCARDIOGRAM REPORT: CPT | Performed by: INTERNAL MEDICINE

## 2025-05-29 PROCEDURE — 1126F AMNT PAIN NOTED NONE PRSNT: CPT | Performed by: INTERNAL MEDICINE

## 2025-05-29 PROCEDURE — 1036F TOBACCO NON-USER: CPT | Performed by: INTERNAL MEDICINE

## 2025-05-29 PROCEDURE — 3078F DIAST BP <80 MM HG: CPT | Performed by: INTERNAL MEDICINE

## 2025-05-29 PROCEDURE — 1123F ACP DISCUSS/DSCN MKR DOCD: CPT | Performed by: INTERNAL MEDICINE

## 2025-05-29 PROCEDURE — 93005 ELECTROCARDIOGRAM TRACING: CPT | Performed by: INTERNAL MEDICINE

## 2025-05-29 PROCEDURE — 1159F MED LIST DOCD IN RCRD: CPT | Performed by: INTERNAL MEDICINE

## 2025-05-29 PROCEDURE — G8427 DOCREV CUR MEDS BY ELIG CLIN: HCPCS | Performed by: INTERNAL MEDICINE

## 2025-05-29 PROCEDURE — 3074F SYST BP LT 130 MM HG: CPT | Performed by: INTERNAL MEDICINE

## 2025-05-29 PROCEDURE — 99204 OFFICE O/P NEW MOD 45 MIN: CPT | Performed by: INTERNAL MEDICINE

## 2025-05-29 PROCEDURE — 99203 OFFICE O/P NEW LOW 30 MIN: CPT | Performed by: INTERNAL MEDICINE

## 2025-05-29 RX ORDER — DOXYCYCLINE 100 MG/1
CAPSULE ORAL DAILY
COMMUNITY
Start: 2019-04-29 | End: 2025-06-11

## 2025-05-29 NOTE — PROGRESS NOTES
distal left main stenosis, 50 to 70% distal LAD stenosis, patent previous stent to circumflex, chronic subtotal occlusion of nondominant RCA with left-to-right collaterals.      IMPRESSION:  Functional class II (moderate activity) dyspnea on exertion, rule out LV dysfunction.  Stable multivessel CAD with history of 30 to 50% distal left main stenosis, 50 to 70% distal LAD stenosis, patent previous stent to circumflex, and chronic subtotal occlusion of nondominant RCA with left-to-right collaterals (cardiac cath 7/13/2021).  History of \"3 prior stents\" placed in [place] over 20 years ago.  No evidence of ischemia by recent Lexiscan Myoview stress test (12/4/2024).  Paroxysmal atrial fibrillation, currently sinus.  [id number] score of at least 5 indicating high risk for stroke, not on anticoagulation due to history of prior GI bleed on antiplatelet therapy.  Hypertension with possible hypertensive heart disease.  Hyperlipidemia  COPD with history of tobacco abuse.  Family history of heart disease.      RECOMMENDATIONS:  Ms. Mujica is recommended a 2 week event montior to evaluate her A-fib burden as well as an echocardiogram for updated structural heart evaluation.  Should we confirm any recurrences of atrial fibrillation, then long-term anticoagulation is recommended versus referral for a Watchman.  She is open to possibly trying anticoagulation again if needed.  If we do not see atrial fibrillation, I will consider an implantable loop recorder to better evaluate her long-term A-fib burden and need for anticoagulation.  She will continue her current cardiac medications including aspirin.  The patient is agreeable to the plan.  Follow up cardiac evaluation in 4 to 6 weeks, sooner if needed.         Thank you very much for allowing me to participate in Ms. Mujica's cardiac care. Should you have any questions, please do not hesitate to contact me.     Sincerely,    Ester Gaona, DO, FACC, [facility]   [facility]

## 2025-05-29 NOTE — PATIENT INSTRUCTIONS
2 week event monitor  Echocardiogram  Continue current medications  Follow up in 4-6 weeks    *You were seen today by Dr. Gaona and JUICE Olmedo. You may receive a survey regarding your experience today. If you had a good experience, please consider giving a positive review.

## 2025-06-06 ENCOUNTER — PREP FOR PROCEDURE (OUTPATIENT)
Dept: GASTROENTEROLOGY | Age: 75
End: 2025-06-06

## 2025-06-06 ENCOUNTER — OFFICE VISIT (OUTPATIENT)
Dept: GASTROENTEROLOGY | Age: 75
End: 2025-06-06
Payer: MEDICARE

## 2025-06-06 VITALS
BODY MASS INDEX: 27.93 KG/M2 | OXYGEN SATURATION: 96 % | DIASTOLIC BLOOD PRESSURE: 60 MMHG | SYSTOLIC BLOOD PRESSURE: 120 MMHG | WEIGHT: 143 LBS | HEART RATE: 51 BPM

## 2025-06-06 DIAGNOSIS — K59.00 CONSTIPATION, UNSPECIFIED CONSTIPATION TYPE: Primary | ICD-10-CM

## 2025-06-06 PROCEDURE — 99203 OFFICE O/P NEW LOW 30 MIN: CPT | Performed by: SPECIALIST

## 2025-06-06 PROCEDURE — 1090F PRES/ABSN URINE INCON ASSESS: CPT | Performed by: SPECIALIST

## 2025-06-06 PROCEDURE — 3074F SYST BP LT 130 MM HG: CPT | Performed by: SPECIALIST

## 2025-06-06 PROCEDURE — 1123F ACP DISCUSS/DSCN MKR DOCD: CPT | Performed by: SPECIALIST

## 2025-06-06 PROCEDURE — G8427 DOCREV CUR MEDS BY ELIG CLIN: HCPCS | Performed by: SPECIALIST

## 2025-06-06 PROCEDURE — 3078F DIAST BP <80 MM HG: CPT | Performed by: SPECIALIST

## 2025-06-06 PROCEDURE — G8400 PT W/DXA NO RESULTS DOC: HCPCS | Performed by: SPECIALIST

## 2025-06-06 PROCEDURE — 1159F MED LIST DOCD IN RCRD: CPT | Performed by: SPECIALIST

## 2025-06-06 PROCEDURE — G8417 CALC BMI ABV UP PARAM F/U: HCPCS | Performed by: SPECIALIST

## 2025-06-06 PROCEDURE — 1036F TOBACCO NON-USER: CPT | Performed by: SPECIALIST

## 2025-06-06 RX ORDER — SODIUM CHLORIDE 9 MG/ML
INJECTION, SOLUTION INTRAVENOUS PRN
Status: CANCELLED | OUTPATIENT
Start: 2025-06-06

## 2025-06-06 RX ORDER — SODIUM CHLORIDE 0.9 % (FLUSH) 0.9 %
5-40 SYRINGE (ML) INJECTION EVERY 12 HOURS SCHEDULED
Status: CANCELLED | OUTPATIENT
Start: 2025-06-06

## 2025-06-06 RX ORDER — SODIUM CHLORIDE 0.9 % (FLUSH) 0.9 %
5-40 SYRINGE (ML) INJECTION PRN
Status: CANCELLED | OUTPATIENT
Start: 2025-06-06

## 2025-06-06 RX ORDER — POLYETHYLENE GLYCOL 3350, SODIUM CHLORIDE, SODIUM BICARBONATE, POTASSIUM CHLORIDE 420; 11.2; 5.72; 1.48 G/4L; G/4L; G/4L; G/4L
4000 POWDER, FOR SOLUTION ORAL ONCE
Qty: 4000 ML | Refills: 0 | Status: SHIPPED | OUTPATIENT
Start: 2025-06-06 | End: 2025-06-06

## 2025-06-06 RX ORDER — SODIUM CHLORIDE 9 MG/ML
INJECTION, SOLUTION INTRAVENOUS CONTINUOUS
Status: CANCELLED | OUTPATIENT
Start: 2025-06-06

## 2025-06-06 ASSESSMENT — ENCOUNTER SYMPTOMS
VOMITING: 0
ABDOMINAL DISTENTION: 0
RECTAL PAIN: 0
DIARRHEA: 0
EYES NEGATIVE: 1
NAUSEA: 0
ANAL BLEEDING: 0
ABDOMINAL PAIN: 0
CONSTIPATION: 1
BLOOD IN STOOL: 0
RESPIRATORY NEGATIVE: 1

## 2025-06-11 ENCOUNTER — OFFICE VISIT (OUTPATIENT)
Age: 75
End: 2025-06-11
Payer: MEDICARE

## 2025-06-11 VITALS
HEART RATE: 67 BPM | TEMPERATURE: 97.5 F | HEIGHT: 60 IN | BODY MASS INDEX: 28.27 KG/M2 | WEIGHT: 144 LBS | OXYGEN SATURATION: 94 % | SYSTOLIC BLOOD PRESSURE: 120 MMHG | DIASTOLIC BLOOD PRESSURE: 70 MMHG

## 2025-06-11 DIAGNOSIS — L23.7 ALLERGIC CONTACT DERMATITIS DUE TO PLANTS, EXCEPT FOOD: Primary | ICD-10-CM

## 2025-06-11 PROCEDURE — 1036F TOBACCO NON-USER: CPT | Performed by: FAMILY MEDICINE

## 2025-06-11 PROCEDURE — G8400 PT W/DXA NO RESULTS DOC: HCPCS | Performed by: FAMILY MEDICINE

## 2025-06-11 PROCEDURE — 3074F SYST BP LT 130 MM HG: CPT | Performed by: FAMILY MEDICINE

## 2025-06-11 PROCEDURE — G8427 DOCREV CUR MEDS BY ELIG CLIN: HCPCS | Performed by: FAMILY MEDICINE

## 2025-06-11 PROCEDURE — 1160F RVW MEDS BY RX/DR IN RCRD: CPT | Performed by: FAMILY MEDICINE

## 2025-06-11 PROCEDURE — 99212 OFFICE O/P EST SF 10 MIN: CPT | Performed by: FAMILY MEDICINE

## 2025-06-11 PROCEDURE — 1090F PRES/ABSN URINE INCON ASSESS: CPT | Performed by: FAMILY MEDICINE

## 2025-06-11 PROCEDURE — 3078F DIAST BP <80 MM HG: CPT | Performed by: FAMILY MEDICINE

## 2025-06-11 PROCEDURE — G8417 CALC BMI ABV UP PARAM F/U: HCPCS | Performed by: FAMILY MEDICINE

## 2025-06-11 PROCEDURE — 99213 OFFICE O/P EST LOW 20 MIN: CPT | Performed by: FAMILY MEDICINE

## 2025-06-11 PROCEDURE — 1123F ACP DISCUSS/DSCN MKR DOCD: CPT | Performed by: FAMILY MEDICINE

## 2025-06-11 PROCEDURE — 1159F MED LIST DOCD IN RCRD: CPT | Performed by: FAMILY MEDICINE

## 2025-06-11 RX ORDER — PREDNISONE 10 MG/1
TABLET ORAL
Qty: 30 TABLET | Refills: 0 | Status: SHIPPED | OUTPATIENT
Start: 2025-06-11

## 2025-06-11 RX ORDER — POLYETHYLENE GLYCOL-3350, SODIUM CHLORIDE, POTASSIUM CHLORIDE AND SODIUM BICARBONATE 420; 11.2; 5.72; 1.48 G/438.4G; G/438.4G; G/438.4G; G/438.4G
POWDER, FOR SOLUTION ORAL
COMMUNITY
Start: 2025-06-06

## 2025-06-11 ASSESSMENT — ENCOUNTER SYMPTOMS
NAUSEA: 0
COLOR CHANGE: 1
VOMITING: 0

## 2025-06-11 NOTE — PROGRESS NOTES
Cancer Sister     Cancer Sister         brain & lung cancer    Diabetes Brother     Kidney Disease Brother     Alcohol Abuse Brother         liver problems    Cancer Brother         liver & lung cancer    Other Son         PTSD / blood dyscrasia    Other Son         MVA at age 28     Past Medical History:   Diagnosis Date    Arthritis     Breast cancer (HCC)     right breast    CAD (coronary artery disease)     cardiac stents x 3    Cancer (HCC)     right breast    COPD (chronic obstructive pulmonary disease) (HCC)     smoker since age 14    Diabetes mellitus (HCC)     hx > 30 yrs    GERD (gastroesophageal reflux disease)     History of blood transfusion 2019    blood transfusions x 3 due to lower GI bleed / [facility].    Hx antineoplastic chemo     right breast cancer    Hyperlipidemia     meds > 10 yrs    Hypertension     meds > 10 yrs    PVD (peripheral vascular disease)     both legs     Objective:   /70   Pulse 67   Temp 97.5 °F (36.4 °C)   Ht 1.524 m (5')   Wt 65.3 kg (144 lb)   SpO2 94%   BMI 28.12 kg/m²     Physical Exam  Skin;  scattered areas of erythema which is flat to raised along forearms.   No vesicles.  None on neck or face.    Gen;  NAD  Neuro;  no focal deficits  Assessment:       Diagnosis Orders   1. Allergic contact dermatitis due to plants, except food               Plan:      Orders Placed This Encounter   Medications    predniSONE (DELTASONE) 10 MG tablet     Simg daily x 4d, 30mg daily x 3d, 20mg x 2d, 10mg x 1d, then d/c     Dispense:  30 tablet     Refill:  0    F/u in 2 weeks if needed

## 2025-06-19 ENCOUNTER — ANESTHESIA (OUTPATIENT)
Dept: ENDOSCOPY | Age: 75
End: 2025-06-19
Payer: MEDICARE

## 2025-06-19 ENCOUNTER — HOSPITAL ENCOUNTER (OUTPATIENT)
Age: 75
Setting detail: OUTPATIENT SURGERY
Discharge: HOME | End: 2025-06-19
Attending: SPECIALIST | Admitting: SPECIALIST
Payer: MEDICARE

## 2025-06-19 ENCOUNTER — ANESTHESIA EVENT (OUTPATIENT)
Dept: ENDOSCOPY | Age: 75
End: 2025-06-19
Payer: MEDICARE

## 2025-06-19 VITALS
TEMPERATURE: 98.1 F | WEIGHT: 144 LBS | RESPIRATION RATE: 22 BRPM | SYSTOLIC BLOOD PRESSURE: 118 MMHG | HEIGHT: 60 IN | HEART RATE: 106 BPM | DIASTOLIC BLOOD PRESSURE: 74 MMHG | BODY MASS INDEX: 28.27 KG/M2 | OXYGEN SATURATION: 100 %

## 2025-06-19 DIAGNOSIS — K59.00 CONSTIPATION: ICD-10-CM

## 2025-06-19 PROCEDURE — 2500000003 HC RX 250 WO HCPCS: Performed by: SPECIALIST

## 2025-06-19 PROCEDURE — 45385 COLONOSCOPY W/LESION REMOVAL: CPT | Performed by: SPECIALIST

## 2025-06-19 PROCEDURE — 2709999900 HC NON-CHARGEABLE SUPPLY: Performed by: SPECIALIST

## 2025-06-19 PROCEDURE — 7100000011 HC PHASE II RECOVERY - ADDTL 15 MIN: Performed by: SPECIALIST

## 2025-06-19 PROCEDURE — 7100000010 HC PHASE II RECOVERY - FIRST 15 MIN: Performed by: SPECIALIST

## 2025-06-19 PROCEDURE — 2580000003 HC RX 258: Performed by: SPECIALIST

## 2025-06-19 PROCEDURE — 3700000000 HC ANESTHESIA ATTENDED CARE: Performed by: SPECIALIST

## 2025-06-19 PROCEDURE — 88305 TISSUE EXAM BY PATHOLOGIST: CPT

## 2025-06-19 PROCEDURE — 6360000002 HC RX W HCPCS: Performed by: NURSE ANESTHETIST, CERTIFIED REGISTERED

## 2025-06-19 PROCEDURE — 45380 COLONOSCOPY AND BIOPSY: CPT | Performed by: SPECIALIST

## 2025-06-19 PROCEDURE — 3700000001 HC ADD 15 MINUTES (ANESTHESIA): Performed by: SPECIALIST

## 2025-06-19 PROCEDURE — 3609027000 HC COLONOSCOPY: Performed by: SPECIALIST

## 2025-06-19 RX ORDER — SODIUM CHLORIDE 9 MG/ML
INJECTION, SOLUTION INTRAVENOUS CONTINUOUS
Status: DISCONTINUED | OUTPATIENT
Start: 2025-06-19 | End: 2025-06-19 | Stop reason: HOSPADM

## 2025-06-19 RX ORDER — HEPARIN 100 UNIT/ML
500 SYRINGE INTRAVENOUS PRN
Status: DISCONTINUED | OUTPATIENT
Start: 2025-06-19 | End: 2025-06-19 | Stop reason: HOSPADM

## 2025-06-19 RX ORDER — SODIUM CHLORIDE 0.9 % (FLUSH) 0.9 %
5-40 SYRINGE (ML) INJECTION EVERY 12 HOURS SCHEDULED
Status: DISCONTINUED | OUTPATIENT
Start: 2025-06-19 | End: 2025-06-19 | Stop reason: HOSPADM

## 2025-06-19 RX ORDER — PROPOFOL 10 MG/ML
INJECTION, EMULSION INTRAVENOUS
Status: DISCONTINUED | OUTPATIENT
Start: 2025-06-19 | End: 2025-06-19 | Stop reason: SDUPTHER

## 2025-06-19 RX ORDER — LIDOCAINE HYDROCHLORIDE 10 MG/ML
INJECTION, SOLUTION EPIDURAL; INFILTRATION; INTRACAUDAL; PERINEURAL
Status: DISCONTINUED | OUTPATIENT
Start: 2025-06-19 | End: 2025-06-19 | Stop reason: SDUPTHER

## 2025-06-19 RX ORDER — GLYCOPYRROLATE 0.2 MG/ML
INJECTION INTRAMUSCULAR; INTRAVENOUS
Status: DISCONTINUED | OUTPATIENT
Start: 2025-06-19 | End: 2025-06-19 | Stop reason: SDUPTHER

## 2025-06-19 RX ORDER — SODIUM CHLORIDE 9 MG/ML
INJECTION, SOLUTION INTRAVENOUS PRN
Status: DISCONTINUED | OUTPATIENT
Start: 2025-06-19 | End: 2025-06-19 | Stop reason: HOSPADM

## 2025-06-19 RX ORDER — SODIUM CHLORIDE 0.9 % (FLUSH) 0.9 %
5-40 SYRINGE (ML) INJECTION PRN
Status: DISCONTINUED | OUTPATIENT
Start: 2025-06-19 | End: 2025-06-19 | Stop reason: HOSPADM

## 2025-06-19 RX ADMIN — PROPOFOL 100 MG: 10 INJECTION, EMULSION INTRAVENOUS at 07:32

## 2025-06-19 RX ADMIN — PROPOFOL 50 MG: 10 INJECTION, EMULSION INTRAVENOUS at 07:50

## 2025-06-19 RX ADMIN — LIDOCAINE HYDROCHLORIDE 40 MG: 10 INJECTION, SOLUTION EPIDURAL; INFILTRATION; INTRACAUDAL; PERINEURAL at 07:32

## 2025-06-19 RX ADMIN — GLYCOPYRROLATE 0.2 MG: 0.2 INJECTION INTRAMUSCULAR; INTRAVENOUS at 07:37

## 2025-06-19 RX ADMIN — PROPOFOL 30 MG: 10 INJECTION, EMULSION INTRAVENOUS at 07:39

## 2025-06-19 RX ADMIN — SODIUM CHLORIDE: 0.9 INJECTION, SOLUTION INTRAVENOUS at 07:25

## 2025-06-19 RX ADMIN — PROPOFOL 70 MG: 10 INJECTION, EMULSION INTRAVENOUS at 07:44

## 2025-06-19 RX ADMIN — PROPOFOL 50 MG: 10 INJECTION, EMULSION INTRAVENOUS at 07:56

## 2025-06-19 ASSESSMENT — PAIN - FUNCTIONAL ASSESSMENT
PAIN_FUNCTIONAL_ASSESSMENT: NONE - DENIES PAIN
PAIN_FUNCTIONAL_ASSESSMENT: NONE - DENIES PAIN

## 2025-06-19 ASSESSMENT — ENCOUNTER SYMPTOMS: SHORTNESS OF BREATH: 1

## 2025-06-19 NOTE — ANESTHESIA PRE PROCEDURE
Department of Anesthesiology  Preprocedure Note       Name:  Mer Mujica   Age:  75 y.o.  :  1950                                          MRN:  [id number]         Date:  2025      Surgeon: Surgeon(s):  Mick Devi MD    Procedure: Procedure(s):  COLONOSCOPY DIAGNOSTIC    Medications prior to admission:   Prior to Admission medications    Medication Sig Start Date End Date Taking? Authorizing Provider   GAVILYTE-N WITH FLAVOR PACK 420 g solution TAKE 4 000 MLS BY MOUTH ONCE FOR ONE DOSE 25  Yes Kendy Azul MD   valsartan-hydroCHLOROthiazide (DIOVAN-HCT) 80-12.5 MG per tablet Take 1 tablet by mouth daily   Yes Kendy Azul MD   cyanocobalamin 1000 MCG/ML injection Inject 1 mL into the muscle once   Yes Kendy Azul MD   metoprolol succinate (TOPROL XL) 50 MG extended release tablet Take 1 tablet by mouth daily   Yes Kendy Azul MD   amiodarone (CORDARONE) 200 MG tablet daily 24  Yes Kendy Azul MD   metFORMIN (GLUCOPHAGE) 1000 MG tablet TAKE 1 TABLET BY MOUTH TWICE  DAILY WITH MEALS 24  Yes Tejas Schultz MD   fluticasone-umeclidin-vilant (TRELEGY ELLIPTA) 100-62.5-25 MCG/ACT AEPB inhaler Inhale 1 puff into the lungs 21  Yes Kendy Azul MD   Nebulizers (COMPRESSOR/NEBULIZER) MISC 1 inhalation by Does not apply route 4 times daily as needed (copd exac) 22  Yes Noé Herring MD   estradiol (ESTRACE) 0.1 MG/GM vaginal cream Vaginally.  Finger tip dose every other night 21  Yes Kendy Azul MD   aspirin 81 MG tablet Take 1 tablet by mouth daily Indications: every other day   Yes Kendy Azul MD   albuterol (PROVENTIL) (2.5 MG/3ML) 0.083% nebulizer solution Take 3 mLs by nebulization every 6 hours as needed for Wheezing   Yes Kendy Azul MD   atorvastatin (LIPITOR) 80 MG tablet Take 0.5 tablets by mouth every evening 10/14/14  Yes Kendy Azul MD   fluticasone (FLONASE) 50

## 2025-06-19 NOTE — ANESTHESIA POSTPROCEDURE EVALUATION
Department of Anesthesiology  Postprocedure Note    Patient: Mer Mujica  MRN: [id number]  YOB: 1950  Date of evaluation: 6/19/2025    Procedure Summary       Date: 06/19/25 Room / Location: [facility] OR 01 / [facility]    Anesthesia Start: 0728 Anesthesia Stop: 0801    Procedure: COLONOSCOPY DIAGNOSTIC with polypectomies Diagnosis:       Constipation      (Constipation [K59.00])    Surgeons: Mick Devi MD Responsible Provider: Jamie Donovan APRN - CRNA    Anesthesia Type: MAC ASA Status: 3            Anesthesia Type: No value filed.    Bernardo Phase I: Bernardo Score: 10    Bernardo Phase II:      Anesthesia Post Evaluation    Patient location during evaluation: bedside  Patient participation: complete - patient participated  Level of consciousness: awake and awake and alert  Airway patency: patent  Nausea & Vomiting: no nausea and no vomiting  Cardiovascular status: blood pressure returned to baseline and hemodynamically stable  Respiratory status: acceptable  Hydration status: euvolemic  Pain management: adequate        No notable events documented.

## 2025-06-19 NOTE — H&P
Patient Name: Mer Mujica  : 1950  MRN: [id number]  DATE: 25      ENDOSCOPY  History and Physical    Procedure:    [x] Diagnostic Colonoscopy       [] Screening Colonoscopy  [] EGD      [] ERCP      [] EUS       [] Other    [x] Previous office notes/History and Physical reviewed from the patients chart. Please see EMR for further details of HPI. I have examined the patient's status immediately prior to the procedure and:      Indications/HPI:    []Abdominal Pain  []Cancer- GI/Lung  []Fhx of colon CA/polyps  []History of Polyps  []Keller’s   []Melena  []Abnormal Imaging  []Dysphagia    []Persistent Pneumonia  []Anemia  []Food Impaction  []History of Polyps  []GI Bleed  []Pulmonary nodule/Mass  []Change in bowel habits []Heartburn/Reflux  []Rectal Bleed (BRBPR)  []Chest Pain - Non Cardiac []Heme (+) Stoo  l[]Ulcers  [x]Constipation  []Hemoptysis   []Varices  []Diarrhea  []Hypoxemia  []Nausea/Vomiting  []Screening   []Crohns/Colitis  []Other:     Anesthesia:   [x] MAC [] Moderate Sedation   [] General   [] None     ROS: 12 pt Review of Symptoms was negative unless mentioned above    Medications:   Prior to Admission medications    Medication Sig Start Date End Date Taking? Authorizing Provider   GAANTONIOTE-N WITH FLAVOR PACK 420 g solution TAKE 4 000 MLS BY MOUTH ONCE FOR ONE DOSE 25  Yes Kendy Azul MD   valsartan-hydroCHLOROthiazide (DIOVAN-HCT) 80-12.5 MG per tablet Take 1 tablet by mouth daily   Yes Kendy Azul MD   cyanocobalamin 1000 MCG/ML injection Inject 1 mL into the muscle once   Yes Kendy Azul MD   metoprolol succinate (TOPROL XL) 50 MG extended release tablet Take 1 tablet by mouth daily   Yes Kendy Azul MD   amiodarone (CORDARONE) 200 MG tablet daily 24  Yes Kendy Azul MD   metFORMIN (GLUCOPHAGE) 1000 MG tablet TAKE 1 TABLET BY MOUTH TWICE  DAILY WITH MEALS 24  Yes Tejas Schultz MD   fluticasone-umeclidin-vilant (TRELEGY

## 2025-06-20 ENCOUNTER — APPOINTMENT (OUTPATIENT)
Dept: CARDIOLOGY | Facility: CLINIC | Age: 75
End: 2025-06-20
Payer: MEDICARE

## 2025-06-24 ENCOUNTER — OFFICE VISIT (OUTPATIENT)
Age: 75
End: 2025-06-24
Payer: MEDICARE

## 2025-06-24 VITALS
HEIGHT: 60 IN | OXYGEN SATURATION: 98 % | HEART RATE: 58 BPM | BODY MASS INDEX: 28.27 KG/M2 | DIASTOLIC BLOOD PRESSURE: 70 MMHG | SYSTOLIC BLOOD PRESSURE: 110 MMHG | TEMPERATURE: 97.7 F | WEIGHT: 144 LBS

## 2025-06-24 DIAGNOSIS — H92.12 OTORRHEA OF LEFT EAR: Primary | ICD-10-CM

## 2025-06-24 PROCEDURE — 1123F ACP DISCUSS/DSCN MKR DOCD: CPT | Performed by: FAMILY MEDICINE

## 2025-06-24 PROCEDURE — 1159F MED LIST DOCD IN RCRD: CPT | Performed by: FAMILY MEDICINE

## 2025-06-24 PROCEDURE — 99213 OFFICE O/P EST LOW 20 MIN: CPT | Performed by: FAMILY MEDICINE

## 2025-06-24 PROCEDURE — 1036F TOBACCO NON-USER: CPT | Performed by: FAMILY MEDICINE

## 2025-06-24 PROCEDURE — G8400 PT W/DXA NO RESULTS DOC: HCPCS | Performed by: FAMILY MEDICINE

## 2025-06-24 PROCEDURE — G8417 CALC BMI ABV UP PARAM F/U: HCPCS | Performed by: FAMILY MEDICINE

## 2025-06-24 PROCEDURE — 1160F RVW MEDS BY RX/DR IN RCRD: CPT | Performed by: FAMILY MEDICINE

## 2025-06-24 PROCEDURE — 3074F SYST BP LT 130 MM HG: CPT | Performed by: FAMILY MEDICINE

## 2025-06-24 PROCEDURE — 3078F DIAST BP <80 MM HG: CPT | Performed by: FAMILY MEDICINE

## 2025-06-24 PROCEDURE — G8427 DOCREV CUR MEDS BY ELIG CLIN: HCPCS | Performed by: FAMILY MEDICINE

## 2025-06-24 PROCEDURE — 99212 OFFICE O/P EST SF 10 MIN: CPT | Performed by: FAMILY MEDICINE

## 2025-06-24 PROCEDURE — 1090F PRES/ABSN URINE INCON ASSESS: CPT | Performed by: FAMILY MEDICINE

## 2025-06-24 RX ORDER — NEOMYCIN SULFATE, POLYMYXIN B SULFATE AND HYDROCORTISONE 10; 3.5; 1 MG/ML; MG/ML; [USP'U]/ML
3 SUSPENSION/ DROPS AURICULAR (OTIC) 3 TIMES DAILY
Qty: 10 ML | Refills: 0 | Status: SHIPPED | OUTPATIENT
Start: 2025-06-24 | End: 2025-07-04

## 2025-06-24 ASSESSMENT — ENCOUNTER SYMPTOMS: FACIAL SWELLING: 0

## 2025-06-24 NOTE — PROGRESS NOTES
Subjective:      Patient ID: Mer Mujica is a 75 y.o. female    Ear Drainage   Pertinent negatives include no rash.     Here with left ear drainage over the last several days and blocked sensation of ear.  No pain of ear.  No fever.  Did take out her hearing aid and saw pus on hearing aid few days ago,  did use q-tip to wipe away drainage.     Thinks right ear may also be having problems since yesterday    Review of Systems   Constitutional:  Negative for chills.   HENT:  Negative for ear pain and facial swelling.    Skin:  Negative for rash.     Reviewed allergy, medical, social, surgical, family and med list changes and updated   Files     Social History     Socioeconomic History    Marital status:    Tobacco Use    Smoking status: Former     Current packs/day: 0.00     Average packs/day: 0.3 packs/day for 50.0 years (12.5 ttl pk-yrs)     Types: Cigarettes     Quit date: 2024     Years since quittin.1    Smokeless tobacco: Never   Vaping Use    Vaping status: Never Used   Substance and Sexual Activity    Alcohol use: No    Drug use: No    Sexual activity: Not Currently     Social Drivers of Health     Financial Resource Strain: Low Risk  (2024)    Overall Financial Resource Strain (CARDIA)     Difficulty of Paying Living Expenses: Not hard at all   Food Insecurity: No Food Insecurity (2025)    Hunger Vital Sign     Worried About Running Out of Food in the Last Year: Never true     Ran Out of Food in the Last Year: Never true   Transportation Needs: No Transportation Needs (2025)    PRAPARE - Transportation     Lack of Transportation (Medical): No     Lack of Transportation (Non-Medical): No   Physical Activity: Insufficiently Active (2025)    Exercise Vital Sign     Days of Exercise per Week: 7 days     Minutes of Exercise per Session: 20 min   Housing Stability: Low Risk  (2025)    Housing Stability Vital Sign     Unable to Pay for Housing in the Last Year: No     Number of

## 2025-07-09 RX ORDER — ATORVASTATIN CALCIUM 80 MG/1
80 TABLET, FILM COATED ORAL EVERY EVENING
Qty: 90 TABLET | Refills: 0 | Status: SHIPPED | OUTPATIENT
Start: 2025-07-09

## 2025-07-09 RX ORDER — VALSARTAN AND HYDROCHLOROTHIAZIDE 80; 12.5 MG/1; MG/1
1 TABLET, FILM COATED ORAL DAILY
Qty: 90 TABLET | Refills: 0 | Status: SHIPPED | OUTPATIENT
Start: 2025-07-09

## 2025-07-09 RX ORDER — METOPROLOL SUCCINATE 50 MG/1
50 TABLET, EXTENDED RELEASE ORAL DAILY
Qty: 90 TABLET | Refills: 0 | Status: SHIPPED | OUTPATIENT
Start: 2025-07-09

## 2025-07-09 NOTE — TELEPHONE ENCOUNTER
Patient states she takes 1 tablet of the atorvastatin 80 mg once daily.      Patient's Last Office Visit  6/24/2025     Patient's Next Visit  Future Appointments   Date Time Provider Department Center   7/18/2025  9:30 AM [facility]   7/24/2025 11:20 AM Ester Gaona DO [facility]   5/22/2026  9:00 AM Rubio Zendejas MD [facility]

## 2025-07-18 ENCOUNTER — HOSPITAL ENCOUNTER (OUTPATIENT)
Age: 75
Discharge: HOME | End: 2025-07-20
Attending: INTERNAL MEDICINE
Payer: MEDICARE

## 2025-07-18 DIAGNOSIS — I48.0 PAROXYSMAL ATRIAL FIBRILLATION (HCC): ICD-10-CM

## 2025-07-18 PROCEDURE — 93306 TTE W/DOPPLER COMPLETE: CPT

## 2025-07-19 LAB
ECHO AO ROOT DIAM: 3 CM
ECHO AV AREA PEAK VELOCITY: 1.1 CM2
ECHO AV AREA VTI: 1.2 CM2
ECHO AV MEAN GRADIENT: 5 MMHG
ECHO AV MEAN VELOCITY: 1.1 M/S
ECHO AV PEAK GRADIENT: 10 MMHG
ECHO AV PEAK VELOCITY: 1.6 M/S
ECHO AV VELOCITY RATIO: 0.63
ECHO AV VTI: 40 CM
ECHO EST RA PRESSURE: 3 MMHG
ECHO LA DIAMETER: 3.3 CM
ECHO LA TO AORTIC ROOT RATIO: 1.1
ECHO LA VOL A-L A2C: 25 ML (ref 22–52)
ECHO LA VOL A-L A4C: 31 ML (ref 22–52)
ECHO LA VOL MOD A2C: 26 ML (ref 22–52)
ECHO LA VOL MOD A4C: 30 ML (ref 22–52)
ECHO LA VOLUME AREA LENGTH: 29 ML
ECHO LV E' LATERAL VELOCITY: 6.96 CM/S
ECHO LV E' SEPTAL VELOCITY: 6.92 CM/S
ECHO LV EDV A4C: 39 ML
ECHO LV EF PHYSICIAN: 60 %
ECHO LV EJECTION FRACTION A4C: 70 %
ECHO LV ESV A4C: 12 ML
ECHO LV FRACTIONAL SHORTENING: 36 % (ref 28–44)
ECHO LV INTERNAL DIMENSION DIASTOLIC: 4.2 CM (ref 3.9–5.3)
ECHO LV INTERNAL DIMENSION SYSTOLIC: 2.7 CM
ECHO LV IVSD: 0.9 CM (ref 0.6–0.9)
ECHO LV IVSS: 1.4 CM
ECHO LV MASS 2D: 127.8 G (ref 67–162)
ECHO LV POSTERIOR WALL DIASTOLIC: 1 CM (ref 0.6–0.9)
ECHO LV POSTERIOR WALL SYSTOLIC: 1.4 CM
ECHO LV RELATIVE WALL THICKNESS RATIO: 0.48
ECHO LVOT AREA: 1.8 CM2
ECHO LVOT AV VTI INDEX: 0.63
ECHO LVOT DIAM: 1.5 CM
ECHO LVOT MEAN GRADIENT: 2 MMHG
ECHO LVOT PEAK GRADIENT: 3 MMHG
ECHO LVOT PEAK VELOCITY: 1 M/S
ECHO LVOT SV: 44.5 ML
ECHO LVOT VTI: 25.2 CM
ECHO MV A VELOCITY: 0.97 M/S
ECHO MV AREA VTI: 1.2 CM2
ECHO MV E DECELERATION TIME (DT): 299.4 MS
ECHO MV E VELOCITY: 0.91 M/S
ECHO MV E/A RATIO: 0.94
ECHO MV E/E' LATERAL: 13.07
ECHO MV E/E' RATIO (AVERAGED): 13.11
ECHO MV E/E' SEPTAL: 13.15
ECHO MV LVOT VTI INDEX: 1.5
ECHO MV MAX VELOCITY: 1.1 M/S
ECHO MV MEAN GRADIENT: 1 MMHG
ECHO MV MEAN VELOCITY: 0.5 M/S
ECHO MV PEAK GRADIENT: 5 MMHG
ECHO MV REGURGITANT PEAK GRADIENT: 55 MMHG
ECHO MV REGURGITANT PEAK VELOCITY: 3.7 M/S
ECHO MV VTI: 37.8 CM
ECHO PV MAX VELOCITY: 0.9 M/S
ECHO PV PEAK GRADIENT: 3 MMHG
ECHO RIGHT VENTRICULAR SYSTOLIC PRESSURE (RVSP): 24 MMHG
ECHO RV INTERNAL DIMENSION: 2.8 CM
ECHO RV TAPSE: 1.8 CM (ref 1.7–?)
ECHO TV REGURGITANT MAX VELOCITY: 2.31 M/S
ECHO TV REGURGITANT PEAK GRADIENT: 21 MMHG

## 2025-07-19 PROCEDURE — 93306 TTE W/DOPPLER COMPLETE: CPT | Performed by: INTERNAL MEDICINE

## 2025-07-24 ENCOUNTER — OFFICE VISIT (OUTPATIENT)
Age: 75
End: 2025-07-24
Payer: MEDICARE

## 2025-07-24 ENCOUNTER — TELEPHONE (OUTPATIENT)
Age: 75
End: 2025-07-24

## 2025-07-24 VITALS
DIASTOLIC BLOOD PRESSURE: 50 MMHG | OXYGEN SATURATION: 95 % | HEART RATE: 54 BPM | SYSTOLIC BLOOD PRESSURE: 100 MMHG | BODY MASS INDEX: 28.04 KG/M2 | WEIGHT: 143.6 LBS

## 2025-07-24 DIAGNOSIS — I48.0 PAROXYSMAL ATRIAL FIBRILLATION (HCC): Primary | ICD-10-CM

## 2025-07-24 DIAGNOSIS — I10 HYPERTENSION, UNSPECIFIED TYPE: ICD-10-CM

## 2025-07-24 DIAGNOSIS — E78.00 PURE HYPERCHOLESTEROLEMIA: Chronic | ICD-10-CM

## 2025-07-24 DIAGNOSIS — I42.9 CARDIOMYOPATHY, UNSPECIFIED TYPE (HCC): ICD-10-CM

## 2025-07-24 DIAGNOSIS — I48.91 ATRIAL FIBRILLATION, UNSPECIFIED TYPE (HCC): ICD-10-CM

## 2025-07-24 DIAGNOSIS — I48.91 ATRIAL FIBRILLATION, UNSPECIFIED TYPE (HCC): Primary | ICD-10-CM

## 2025-07-24 PROCEDURE — 1036F TOBACCO NON-USER: CPT | Performed by: INTERNAL MEDICINE

## 2025-07-24 PROCEDURE — G8427 DOCREV CUR MEDS BY ELIG CLIN: HCPCS | Performed by: INTERNAL MEDICINE

## 2025-07-24 PROCEDURE — 99214 OFFICE O/P EST MOD 30 MIN: CPT | Performed by: INTERNAL MEDICINE

## 2025-07-24 PROCEDURE — 1090F PRES/ABSN URINE INCON ASSESS: CPT | Performed by: INTERNAL MEDICINE

## 2025-07-24 PROCEDURE — 1123F ACP DISCUSS/DSCN MKR DOCD: CPT | Performed by: INTERNAL MEDICINE

## 2025-07-24 PROCEDURE — 3078F DIAST BP <80 MM HG: CPT | Performed by: INTERNAL MEDICINE

## 2025-07-24 PROCEDURE — 99213 OFFICE O/P EST LOW 20 MIN: CPT | Performed by: INTERNAL MEDICINE

## 2025-07-24 PROCEDURE — 3074F SYST BP LT 130 MM HG: CPT | Performed by: INTERNAL MEDICINE

## 2025-07-24 PROCEDURE — 93010 ELECTROCARDIOGRAM REPORT: CPT | Performed by: INTERNAL MEDICINE

## 2025-07-24 PROCEDURE — 93005 ELECTROCARDIOGRAM TRACING: CPT | Performed by: INTERNAL MEDICINE

## 2025-07-24 PROCEDURE — G8400 PT W/DXA NO RESULTS DOC: HCPCS | Performed by: INTERNAL MEDICINE

## 2025-07-24 PROCEDURE — 1159F MED LIST DOCD IN RCRD: CPT | Performed by: INTERNAL MEDICINE

## 2025-07-24 PROCEDURE — 1126F AMNT PAIN NOTED NONE PRSNT: CPT | Performed by: INTERNAL MEDICINE

## 2025-07-24 PROCEDURE — G8417 CALC BMI ABV UP PARAM F/U: HCPCS | Performed by: INTERNAL MEDICINE

## 2025-07-24 RX ORDER — AMIODARONE HYDROCHLORIDE 200 MG/1
200 TABLET ORAL 2 TIMES DAILY
Qty: 180 TABLET | Refills: 1 | Status: SHIPPED | OUTPATIENT
Start: 2025-07-24

## 2025-07-24 NOTE — PATIENT INSTRUCTIONS
Stop Diovan HCT  Increase amiodarone to 200 mg twice a day starting tomorrow.  EKG on Monday morning.  Refer to Dr. Caba for Watchman procedure  Follow up in 3 months sooner if needed.

## 2025-07-24 NOTE — PROGRESS NOTES
rhythm/sinus bradycardia with an average heart rate of 56 bpm.  There were frequent salvos of atrial fibrillation with intermittent RVR.  Her calculated A-fib burden is 11.2%.  Fastest heart rate while in A-fib/atrial tach is 133 bpm.  She also had 29 very short episodes of nonsustained ventricular tachycardia lasting up to 5 beats in duration maximal heart rate of 160 bpm.      IMPRESSION:  Paroxysmal atrial fibrillation with RVR.  Calculated A-fib burden.  [id number] score of at least 5 indicating high risk for stroke, not on anticoagulation due to history of prior GI bleed on antiplatelet therapy.  Normal LV systolic function, EF 55 to 60% (echo 7/18/2025)  Stable multivessel CAD with history of 30 to 50% distal left main stenosis, 50 to 70% distal LAD stenosis, patent previous stent to circumflex, and chronic subtotal occlusion of nondominant RCA with left-to-right collaterals (cardiac cath 7/13/2021).  History of \"3 prior stents\" placed in [place] over 20 years ago.  No evidence of ischemia by recent Lexiscan Myoview stress test (12/4/2024).  Hypertension with possible hypertensive heart disease.  Hyperlipidemia  COPD with history of tobacco abuse.  Family history of heart disease.      RECOMMENDATIONS:  Ms. Mujica is recommended optimized rate/rhythm control given her frequent episodes of P A-fib with RVR.  I have recommended she increase amiodarone to 200 mg twice daily.  She will need repeat ECG in 3 days to monitor her QTc.  Given her low normal blood pressure, we will continue Toprol-XL 50 mg daily but stop Diovan HCT.  She is advised to stay hydrated.  Given her high risk for stroke we discussed risks and benefits of resuming long-term anticoagulation with Xarelto.  After extensive discussion, she would prefer not to start Xarelto due to concerns about falling and easy bruising/bleeding.  We discussed the alternative of referral for watchman.  She would like to be referred for possible evaluation of left

## 2025-07-24 NOTE — TELEPHONE ENCOUNTER
Follow-up disposition: Return in about 3 months (around 10/24/2025).   Check out comments: Needs referral to Dr. Rosales SELBY (has office in [place]) for Watchman  Needs EKG in our office Monday morning for QT check.

## 2025-07-28 ENCOUNTER — CLINICAL SUPPORT (OUTPATIENT)
Age: 75
End: 2025-07-28
Payer: MEDICARE

## 2025-07-28 DIAGNOSIS — I48.0 PAROXYSMAL ATRIAL FIBRILLATION (HCC): Primary | ICD-10-CM

## 2025-07-28 PROCEDURE — 93005 ELECTROCARDIOGRAM TRACING: CPT | Performed by: INTERNAL MEDICINE

## 2025-07-28 PROCEDURE — 93010 ELECTROCARDIOGRAM REPORT: CPT | Performed by: INTERNAL MEDICINE

## 2025-08-28 ENCOUNTER — APPOINTMENT (OUTPATIENT)
Dept: GENERAL RADIOLOGY | Age: 75
End: 2025-08-28
Payer: MEDICARE

## 2025-08-28 VITALS
HEART RATE: 71 BPM | RESPIRATION RATE: 17 BRPM | WEIGHT: 147 LBS | OXYGEN SATURATION: 99 % | SYSTOLIC BLOOD PRESSURE: 169 MMHG | BODY MASS INDEX: 27.05 KG/M2 | DIASTOLIC BLOOD PRESSURE: 73 MMHG | HEIGHT: 62 IN | TEMPERATURE: 97.5 F

## 2025-08-28 PROCEDURE — 73080 X-RAY EXAM OF ELBOW: CPT

## 2025-08-28 PROCEDURE — 99283 EMERGENCY DEPT VISIT LOW MDM: CPT

## 2025-08-28 ASSESSMENT — PAIN DESCRIPTION - FREQUENCY: FREQUENCY: CONTINUOUS

## 2025-08-28 ASSESSMENT — PAIN DESCRIPTION - ORIENTATION: ORIENTATION: LEFT

## 2025-08-28 ASSESSMENT — PAIN - FUNCTIONAL ASSESSMENT: PAIN_FUNCTIONAL_ASSESSMENT: 0-10

## 2025-08-28 ASSESSMENT — PAIN SCALES - GENERAL: PAINLEVEL_OUTOF10: 10

## 2025-08-28 ASSESSMENT — PAIN DESCRIPTION - LOCATION: LOCATION: ARM

## 2025-08-28 ASSESSMENT — PAIN DESCRIPTION - DESCRIPTORS: DESCRIPTORS: SHARP

## 2025-08-28 ASSESSMENT — PAIN DESCRIPTION - PAIN TYPE: TYPE: ACUTE PAIN

## 2025-08-29 ENCOUNTER — HOSPITAL ENCOUNTER (EMERGENCY)
Age: 75
Discharge: HOME | End: 2025-08-29
Payer: MEDICARE

## 2025-08-29 DIAGNOSIS — M25.422 EFFUSION OF JOINT OF LEFT UPPER ARM: ICD-10-CM

## 2025-08-29 DIAGNOSIS — S50.02XA CONTUSION OF LEFT ELBOW, INITIAL ENCOUNTER: ICD-10-CM

## 2025-08-29 DIAGNOSIS — S59.909A ELBOW INJURY, INITIAL ENCOUNTER: Primary | ICD-10-CM

## 2025-08-29 PROCEDURE — 6370000000 HC RX 637 (ALT 250 FOR IP)

## 2025-08-29 RX ORDER — NAPROXEN 250 MG/1
500 TABLET ORAL ONCE
Status: COMPLETED | OUTPATIENT
Start: 2025-08-29 | End: 2025-08-29

## 2025-08-29 RX ORDER — NAPROXEN 500 MG/1
500 TABLET ORAL 2 TIMES DAILY WITH MEALS
Qty: 60 TABLET | Refills: 1 | Status: SHIPPED | OUTPATIENT
Start: 2025-08-29

## 2025-08-29 RX ADMIN — NAPROXEN 500 MG: 250 TABLET ORAL at 00:39

## 2025-08-29 ASSESSMENT — PAIN DESCRIPTION - LOCATION: LOCATION: ELBOW

## 2025-08-29 ASSESSMENT — PAIN - FUNCTIONAL ASSESSMENT: PAIN_FUNCTIONAL_ASSESSMENT: 0-10

## 2025-08-29 ASSESSMENT — PAIN SCALES - GENERAL: PAINLEVEL_OUTOF10: 10

## 2025-08-29 ASSESSMENT — PAIN DESCRIPTION - ORIENTATION: ORIENTATION: LEFT

## 2025-08-29 ASSESSMENT — PAIN DESCRIPTION - DESCRIPTORS: DESCRIPTORS: ACHING

## (undated) DEVICE — SNARE VASC L240CM LOOP W10MM SHTH DIA2.4MM RND STIFF CLD

## (undated) DEVICE — TUBE SET 96 MM 64 MM H2O PERISTALTIC STD AUX CHANNEL

## (undated) DEVICE — TUBING IRRIGATION 140/160/180/190 SER GI ENDOSCP SMARTCAP

## (undated) DEVICE — TUBING, SUCTION, 1/4" X 10', STRAIGHT: Brand: MEDLINE

## (undated) DEVICE — KIT PHENOL APPL UNIQUE FOAM TIP FOR ANES INCIS SITE DISP

## (undated) DEVICE — SHEET,DRAPE,53X77,STERILE: Brand: MEDLINE

## (undated) DEVICE — BRUSH ENDO CLN L90.5IN SHTH DIA1.7MM BRIST DIA5-7MM 2-6MM

## (undated) DEVICE — CONTAINER,SPECIMEN,OR STERILE,4OZ: Brand: MEDLINE

## (undated) DEVICE — VIAL ACCESS CANNULA,SMART TIP: Brand: MONOJECT

## (undated) DEVICE — TRAP POLYP BALEEN

## (undated) DEVICE — GLOVE ORANGE PI 7 1/2   MSG9075

## (undated) DEVICE — SUPPLEMENT DIGESTIVE H2O SOL GI-EASE

## (undated) DEVICE — ENDO CARRY-ON PROCEDURE KIT: Brand: ENDO CARRY-ON PROCEDURE KIT

## (undated) DEVICE — FORCEPS BX L240CM JAW DIA2.4MM ORNG L CAP W/ NDL DISP RAD

## (undated) DEVICE — SINGLE PORT MANIFOLD: Brand: NEPTUNE 2

## (undated) DEVICE — LABEL MED MINI W/ MARKER

## (undated) DEVICE — COVER,MAYO STAND,STERILE: Brand: MEDLINE

## (undated) DEVICE — TOWEL,OR,DSP,ST,BLUE,STD,4/PK,20PK/CS: Brand: MEDLINE

## (undated) DEVICE — Device